# Patient Record
Sex: FEMALE | Race: WHITE
[De-identification: names, ages, dates, MRNs, and addresses within clinical notes are randomized per-mention and may not be internally consistent; named-entity substitution may affect disease eponyms.]

---

## 2019-11-07 ENCOUNTER — HOSPITAL ENCOUNTER (INPATIENT)
Dept: HOSPITAL 12 - ER | Age: 84
LOS: 7 days | Discharge: NURSING, REHAB OR LONG-TERM CARE | DRG: 871 | End: 2019-11-14
Attending: INTERNAL MEDICINE | Admitting: INTERNAL MEDICINE
Payer: MEDICARE

## 2019-11-07 VITALS — WEIGHT: 144 LBS | BODY MASS INDEX: 24.59 KG/M2 | HEIGHT: 64 IN

## 2019-11-07 VITALS — SYSTOLIC BLOOD PRESSURE: 132 MMHG | DIASTOLIC BLOOD PRESSURE: 69 MMHG

## 2019-11-07 DIAGNOSIS — M47.9: ICD-10-CM

## 2019-11-07 DIAGNOSIS — I34.0: ICD-10-CM

## 2019-11-07 DIAGNOSIS — G92: ICD-10-CM

## 2019-11-07 DIAGNOSIS — I48.0: ICD-10-CM

## 2019-11-07 DIAGNOSIS — F03.90: ICD-10-CM

## 2019-11-07 DIAGNOSIS — I25.10: ICD-10-CM

## 2019-11-07 DIAGNOSIS — F32.9: ICD-10-CM

## 2019-11-07 DIAGNOSIS — G40.909: ICD-10-CM

## 2019-11-07 DIAGNOSIS — G62.9: ICD-10-CM

## 2019-11-07 DIAGNOSIS — M81.0: ICD-10-CM

## 2019-11-07 DIAGNOSIS — I25.5: ICD-10-CM

## 2019-11-07 DIAGNOSIS — Z90.710: ICD-10-CM

## 2019-11-07 DIAGNOSIS — D68.59: ICD-10-CM

## 2019-11-07 DIAGNOSIS — R26.81: ICD-10-CM

## 2019-11-07 DIAGNOSIS — E78.5: ICD-10-CM

## 2019-11-07 DIAGNOSIS — I50.43: ICD-10-CM

## 2019-11-07 DIAGNOSIS — N18.4: ICD-10-CM

## 2019-11-07 DIAGNOSIS — J96.01: ICD-10-CM

## 2019-11-07 DIAGNOSIS — E43: ICD-10-CM

## 2019-11-07 DIAGNOSIS — A41.9: Primary | ICD-10-CM

## 2019-11-07 DIAGNOSIS — N13.30: ICD-10-CM

## 2019-11-07 DIAGNOSIS — Z79.899: ICD-10-CM

## 2019-11-07 DIAGNOSIS — Z86.718: ICD-10-CM

## 2019-11-07 DIAGNOSIS — I13.0: ICD-10-CM

## 2019-11-07 DIAGNOSIS — Z74.09: ICD-10-CM

## 2019-11-07 DIAGNOSIS — I25.2: ICD-10-CM

## 2019-11-07 DIAGNOSIS — Z87.01: ICD-10-CM

## 2019-11-07 DIAGNOSIS — Z79.01: ICD-10-CM

## 2019-11-07 DIAGNOSIS — R91.8: ICD-10-CM

## 2019-11-07 DIAGNOSIS — M48.02: ICD-10-CM

## 2019-11-07 DIAGNOSIS — E87.1: ICD-10-CM

## 2019-11-07 DIAGNOSIS — Z91.81: ICD-10-CM

## 2019-11-07 DIAGNOSIS — J18.1: ICD-10-CM

## 2019-11-07 DIAGNOSIS — F41.9: ICD-10-CM

## 2019-11-07 DIAGNOSIS — N81.4: ICD-10-CM

## 2019-11-07 DIAGNOSIS — I21.A1: ICD-10-CM

## 2019-11-07 DIAGNOSIS — I44.1: ICD-10-CM

## 2019-11-07 DIAGNOSIS — N17.0: ICD-10-CM

## 2019-11-07 LAB
BASOPHILS # BLD AUTO: 0.1 K/UL (ref 0–8)
BASOPHILS NFR BLD AUTO: 0.8 % (ref 0–2)
BUN SERPL-MCNC: 32 MG/DL (ref 7–18)
CHLORIDE SERPL-SCNC: 98 MMOL/L (ref 98–107)
CO2 SERPL-SCNC: 29 MMOL/L (ref 21–32)
CREAT SERPL-MCNC: 2.4 MG/DL (ref 0.6–1.3)
EOSINOPHIL # BLD AUTO: 0.1 K/UL (ref 0–0.7)
EOSINOPHIL NFR BLD AUTO: 1.1 % (ref 0–7)
GLUCOSE SERPL-MCNC: 94 MG/DL (ref 74–106)
HCT VFR BLD AUTO: 24.5 % (ref 31.2–41.9)
HGB BLD-MCNC: 7.7 G/DL (ref 10.9–14.3)
LYMPHOCYTES # BLD AUTO: 1 K/UL (ref 20–40)
LYMPHOCYTES NFR BLD AUTO: 8.6 % (ref 20.5–51.5)
MCH RBC QN AUTO: 28.7 UUG (ref 24.7–32.8)
MCHC RBC AUTO-ENTMCNC: 32 G/DL (ref 32.3–35.6)
MCV RBC AUTO: 90.7 FL (ref 75.5–95.3)
MONOCYTES # BLD AUTO: 0.7 K/UL (ref 2–10)
MONOCYTES NFR BLD AUTO: 5.6 % (ref 0–11)
NEUTROPHILS # BLD AUTO: 10 K/UL (ref 1.8–8.9)
NEUTROPHILS NFR BLD AUTO: 83.9 % (ref 38.5–71.5)
PLATELET # BLD AUTO: 594 K/UL (ref 179–408)
POTASSIUM SERPL-SCNC: 4.9 MMOL/L (ref 3.5–5.1)
RBC # BLD AUTO: 2.7 MIL/UL (ref 3.63–4.92)
WBC # BLD AUTO: 11.9 K/UL (ref 3.8–11.8)

## 2019-11-07 PROCEDURE — P9021 RED BLOOD CELLS UNIT: HCPCS

## 2019-11-07 PROCEDURE — G0378 HOSPITAL OBSERVATION PER HR: HCPCS

## 2019-11-07 PROCEDURE — A4663 DIALYSIS BLOOD PRESSURE CUFF: HCPCS

## 2019-11-08 VITALS — SYSTOLIC BLOOD PRESSURE: 115 MMHG | DIASTOLIC BLOOD PRESSURE: 67 MMHG

## 2019-11-08 VITALS — SYSTOLIC BLOOD PRESSURE: 105 MMHG | DIASTOLIC BLOOD PRESSURE: 58 MMHG

## 2019-11-08 VITALS — DIASTOLIC BLOOD PRESSURE: 61 MMHG | SYSTOLIC BLOOD PRESSURE: 117 MMHG

## 2019-11-08 VITALS — SYSTOLIC BLOOD PRESSURE: 136 MMHG | DIASTOLIC BLOOD PRESSURE: 67 MMHG

## 2019-11-08 VITALS — DIASTOLIC BLOOD PRESSURE: 64 MMHG | SYSTOLIC BLOOD PRESSURE: 120 MMHG

## 2019-11-08 VITALS — DIASTOLIC BLOOD PRESSURE: 47 MMHG | SYSTOLIC BLOOD PRESSURE: 87 MMHG

## 2019-11-08 VITALS — SYSTOLIC BLOOD PRESSURE: 139 MMHG | DIASTOLIC BLOOD PRESSURE: 56 MMHG

## 2019-11-08 VITALS — SYSTOLIC BLOOD PRESSURE: 128 MMHG | DIASTOLIC BLOOD PRESSURE: 62 MMHG

## 2019-11-08 VITALS — DIASTOLIC BLOOD PRESSURE: 70 MMHG | SYSTOLIC BLOOD PRESSURE: 140 MMHG

## 2019-11-08 VITALS — SYSTOLIC BLOOD PRESSURE: 100 MMHG | DIASTOLIC BLOOD PRESSURE: 60 MMHG

## 2019-11-08 VITALS — DIASTOLIC BLOOD PRESSURE: 55 MMHG | SYSTOLIC BLOOD PRESSURE: 104 MMHG

## 2019-11-08 VITALS — DIASTOLIC BLOOD PRESSURE: 63 MMHG | SYSTOLIC BLOOD PRESSURE: 125 MMHG

## 2019-11-08 VITALS — DIASTOLIC BLOOD PRESSURE: 59 MMHG | SYSTOLIC BLOOD PRESSURE: 110 MMHG

## 2019-11-08 VITALS — DIASTOLIC BLOOD PRESSURE: 69 MMHG | SYSTOLIC BLOOD PRESSURE: 153 MMHG

## 2019-11-08 VITALS — DIASTOLIC BLOOD PRESSURE: 63 MMHG | SYSTOLIC BLOOD PRESSURE: 146 MMHG

## 2019-11-08 VITALS — SYSTOLIC BLOOD PRESSURE: 135 MMHG | DIASTOLIC BLOOD PRESSURE: 59 MMHG

## 2019-11-08 VITALS — DIASTOLIC BLOOD PRESSURE: 54 MMHG | SYSTOLIC BLOOD PRESSURE: 108 MMHG

## 2019-11-08 VITALS — SYSTOLIC BLOOD PRESSURE: 111 MMHG | DIASTOLIC BLOOD PRESSURE: 30 MMHG

## 2019-11-08 VITALS — DIASTOLIC BLOOD PRESSURE: 61 MMHG | SYSTOLIC BLOOD PRESSURE: 126 MMHG

## 2019-11-08 VITALS — SYSTOLIC BLOOD PRESSURE: 109 MMHG | DIASTOLIC BLOOD PRESSURE: 63 MMHG

## 2019-11-08 VITALS — DIASTOLIC BLOOD PRESSURE: 54 MMHG | SYSTOLIC BLOOD PRESSURE: 109 MMHG

## 2019-11-08 VITALS — DIASTOLIC BLOOD PRESSURE: 73 MMHG | SYSTOLIC BLOOD PRESSURE: 130 MMHG

## 2019-11-08 VITALS — DIASTOLIC BLOOD PRESSURE: 58 MMHG | SYSTOLIC BLOOD PRESSURE: 116 MMHG

## 2019-11-08 VITALS — SYSTOLIC BLOOD PRESSURE: 126 MMHG | DIASTOLIC BLOOD PRESSURE: 61 MMHG

## 2019-11-08 VITALS — DIASTOLIC BLOOD PRESSURE: 60 MMHG | SYSTOLIC BLOOD PRESSURE: 104 MMHG

## 2019-11-08 VITALS — DIASTOLIC BLOOD PRESSURE: 89 MMHG | SYSTOLIC BLOOD PRESSURE: 120 MMHG

## 2019-11-08 VITALS — DIASTOLIC BLOOD PRESSURE: 72 MMHG | SYSTOLIC BLOOD PRESSURE: 140 MMHG

## 2019-11-08 VITALS — SYSTOLIC BLOOD PRESSURE: 133 MMHG | DIASTOLIC BLOOD PRESSURE: 64 MMHG

## 2019-11-08 LAB
ALP SERPL-CCNC: 61 U/L (ref 50–136)
ALT SERPL W/O P-5'-P-CCNC: 7 U/L (ref 14–59)
APPEARANCE UR: CLEAR
APPEARANCE UR: CLEAR
AST SERPL-CCNC: 9 U/L (ref 15–37)
BASOPHILS # BLD AUTO: 0.1 K/UL (ref 0–8)
BASOPHILS NFR BLD AUTO: 0.7 % (ref 0–2)
BILIRUB SERPL-MCNC: 0.3 MG/DL (ref 0.2–1)
BILIRUB UR QL STRIP: NEGATIVE
BILIRUB UR QL STRIP: NEGATIVE
BUN SERPL-MCNC: 31 MG/DL (ref 7–18)
CHLORIDE SERPL-SCNC: 98 MMOL/L (ref 98–107)
CHOLEST SERPL-MCNC: 124 MG/DL (ref ?–200)
CO2 SERPL-SCNC: 29 MMOL/L (ref 21–32)
COLOR UR: YELLOW
COLOR UR: YELLOW
CREAT SERPL-MCNC: 2.5 MG/DL (ref 0.6–1.3)
CREAT UR-MCNC: 27.6 MG/DL (ref 30–125)
DEPRECATED SQUAMOUS URNS QL MICRO: (no result) /HPF
DEPRECATED SQUAMOUS URNS QL MICRO: (no result) /HPF
EOSINOPHIL # BLD AUTO: 0.2 K/UL (ref 0–0.7)
EOSINOPHIL NFR BLD AUTO: 1.8 % (ref 0–7)
GLUCOSE SERPL-MCNC: 108 MG/DL (ref 74–106)
GLUCOSE UR STRIP-MCNC: NEGATIVE MG/DL
GLUCOSE UR STRIP-MCNC: NEGATIVE MG/DL
HCT VFR BLD AUTO: 24.2 % (ref 31.2–41.9)
HDLC SERPL-MCNC: 29 MG/DL (ref 40–60)
HEMOCCULT STL QL: NEGATIVE
HGB BLD-MCNC: 7.8 G/DL (ref 10.9–14.3)
HGB UR QL STRIP: (no result)
HGB UR QL STRIP: NEGATIVE
IRON SERPL-MCNC: 21 UG/DL (ref 50–175)
KETONES UR STRIP-MCNC: NEGATIVE MG/DL
KETONES UR STRIP-MCNC: NEGATIVE MG/DL
LEUKOCYTE ESTERASE UR QL STRIP: NEGATIVE
LEUKOCYTE ESTERASE UR QL STRIP: NEGATIVE
LYMPHOCYTES # BLD AUTO: 1.2 K/UL (ref 20–40)
LYMPHOCYTES NFR BLD AUTO: 9.5 % (ref 20.5–51.5)
MAGNESIUM SERPL-MCNC: 1.6 MG/DL (ref 1.8–2.4)
MCH RBC QN AUTO: 29.9 UUG (ref 24.7–32.8)
MCHC RBC AUTO-ENTMCNC: 32 G/DL (ref 32.3–35.6)
MCV RBC AUTO: 93.5 FL (ref 75.5–95.3)
MONOCYTES # BLD AUTO: 0.7 K/UL (ref 2–10)
MONOCYTES NFR BLD AUTO: 5.7 % (ref 0–11)
MUCOUS THREADS URNS QL MICRO: (no result) /LPF
NEUTROPHILS # BLD AUTO: 10.2 K/UL (ref 1.8–8.9)
NEUTROPHILS NFR BLD AUTO: 82.3 % (ref 38.5–71.5)
NITRITE UR QL STRIP: NEGATIVE
NITRITE UR QL STRIP: NEGATIVE
PH UR STRIP: 6 [PH] (ref 5–8)
PH UR STRIP: 6 [PH] (ref 5–8)
PHOSPHATE SERPL-MCNC: 4.4 MG/DL (ref 2.5–4.9)
PLATELET # BLD AUTO: 569 K/UL (ref 179–408)
POTASSIUM SERPL-SCNC: 4.5 MMOL/L (ref 3.5–5.1)
PROT UR-MCNC: 44.2 MG/DL
RBC # BLD AUTO: 2.59 MIL/UL (ref 3.63–4.92)
RBC #/AREA URNS HPF: (no result) /HPF (ref 0–3)
RBC #/AREA URNS HPF: (no result) /HPF (ref 0–3)
SP GR UR STRIP: 1.01 (ref 1–1.03)
SP GR UR STRIP: 1.01 (ref 1–1.03)
TRIGL SERPL-MCNC: 49 MG/DL (ref 30–150)
TSH SERPL DL<=0.005 MIU/L-ACNC: 4.31 MIU/ML (ref 0.36–3.74)
UROBILINOGEN UR STRIP-MCNC: 0.2 E.U./DL
UROBILINOGEN UR STRIP-MCNC: 0.2 E.U./DL
WBC # BLD AUTO: 12.4 K/UL (ref 3.8–11.8)
WBC #/AREA URNS HPF: (no result) /HPF
WBC #/AREA URNS HPF: (no result) /HPF (ref 0–3)
WBC #/AREA URNS HPF: (no result) /HPF (ref 0–3)
WS STN SPEC: 6.9 G/DL (ref 6.4–8.2)

## 2019-11-08 PROCEDURE — 30233N1 TRANSFUSION OF NONAUTOLOGOUS RED BLOOD CELLS INTO PERIPHERAL VEIN, PERCUTANEOUS APPROACH: ICD-10-PCS | Performed by: INTERNAL MEDICINE

## 2019-11-08 RX ADMIN — LACTOBACILLUS ACIDOPH-L.BULGARICUS 1 MILLION CELL CHEWABLE TABLET SCH TAB.CHEW: at 09:04

## 2019-11-08 RX ADMIN — GABAPENTIN SCH MG: 100 CAPSULE ORAL at 10:48

## 2019-11-08 RX ADMIN — LEVETIRACETAM SCH MG: 500 TABLET, FILM COATED ORAL at 17:16

## 2019-11-08 RX ADMIN — PIPERACILLIN SODIUM AND TAZOBACTAM SODIUM SCH MLS/HR: .25; 2 INJECTION, POWDER, LYOPHILIZED, FOR SOLUTION INTRAVENOUS at 00:35

## 2019-11-08 RX ADMIN — FUROSEMIDE SCH MG: 10 INJECTION, SOLUTION INTRAMUSCULAR; INTRAVENOUS at 09:03

## 2019-11-08 RX ADMIN — GABAPENTIN SCH MG: 100 CAPSULE ORAL at 12:41

## 2019-11-08 RX ADMIN — ESCITALOPRAM OXALATE SCH MG: 10 TABLET, FILM COATED ORAL at 09:04

## 2019-11-08 RX ADMIN — Medication PRN MG: at 12:36

## 2019-11-08 RX ADMIN — TAZOBACTAM SODIUM AND PIPERACILLIN SODIUM SCH MLS/HR: 375; 3 INJECTION, SOLUTION INTRAVENOUS at 23:38

## 2019-11-08 RX ADMIN — Medication SCH ML: at 12:41

## 2019-11-08 RX ADMIN — LEVETIRACETAM SCH MG: 500 TABLET, FILM COATED ORAL at 09:04

## 2019-11-08 RX ADMIN — Medication SCH ML: at 17:14

## 2019-11-08 RX ADMIN — Medication SCH MG: at 17:00

## 2019-11-08 RX ADMIN — Medication SCH MG: at 09:04

## 2019-11-08 RX ADMIN — ATORVASTATIN CALCIUM SCH MG: 20 TABLET, FILM COATED ORAL at 20:36

## 2019-11-08 RX ADMIN — DOCUSATE SODIUM SCH MG: 100 CAPSULE, LIQUID FILLED ORAL at 20:37

## 2019-11-08 RX ADMIN — GABAPENTIN SCH MG: 100 CAPSULE ORAL at 17:16

## 2019-11-08 RX ADMIN — AMIODARONE HYDROCHLORIDE SCH MG: 200 TABLET ORAL at 09:04

## 2019-11-08 RX ADMIN — LACTOBACILLUS ACIDOPH-L.BULGARICUS 1 MILLION CELL CHEWABLE TABLET SCH TAB.CHEW: at 17:16

## 2019-11-08 RX ADMIN — MAGNESIUM SULFATE IN DEXTROSE SCH MLS/HR: 10 INJECTION, SOLUTION INTRAVENOUS at 10:42

## 2019-11-08 RX ADMIN — PIPERACILLIN SODIUM AND TAZOBACTAM SODIUM SCH MLS/HR: .25; 2 INJECTION, POWDER, LYOPHILIZED, FOR SOLUTION INTRAVENOUS at 06:12

## 2019-11-08 RX ADMIN — HEPARIN SODIUM AND DEXTROSE PRN MLS/HR: 5000; 5 INJECTION INTRAVENOUS at 02:45

## 2019-11-08 RX ADMIN — ASPIRIN SCH MG: 81 TABLET, CHEWABLE ORAL at 09:03

## 2019-11-08 RX ADMIN — Medication SCH ML: at 09:03

## 2019-11-08 RX ADMIN — TAZOBACTAM SODIUM AND PIPERACILLIN SODIUM SCH MLS/HR: 375; 3 INJECTION, SOLUTION INTRAVENOUS at 12:41

## 2019-11-08 RX ADMIN — FAMOTIDINE SCH MG: 20 TABLET, FILM COATED ORAL at 09:03

## 2019-11-08 RX ADMIN — MAGNESIUM SULFATE IN DEXTROSE SCH MLS/HR: 10 INJECTION, SOLUTION INTRAVENOUS at 09:02

## 2019-11-09 VITALS — SYSTOLIC BLOOD PRESSURE: 109 MMHG | DIASTOLIC BLOOD PRESSURE: 54 MMHG

## 2019-11-09 VITALS — DIASTOLIC BLOOD PRESSURE: 67 MMHG | SYSTOLIC BLOOD PRESSURE: 129 MMHG

## 2019-11-09 VITALS — SYSTOLIC BLOOD PRESSURE: 137 MMHG | DIASTOLIC BLOOD PRESSURE: 71 MMHG

## 2019-11-09 VITALS — SYSTOLIC BLOOD PRESSURE: 125 MMHG | DIASTOLIC BLOOD PRESSURE: 65 MMHG

## 2019-11-09 VITALS — DIASTOLIC BLOOD PRESSURE: 67 MMHG | SYSTOLIC BLOOD PRESSURE: 157 MMHG

## 2019-11-09 VITALS — DIASTOLIC BLOOD PRESSURE: 71 MMHG | SYSTOLIC BLOOD PRESSURE: 125 MMHG

## 2019-11-09 VITALS — SYSTOLIC BLOOD PRESSURE: 126 MMHG | DIASTOLIC BLOOD PRESSURE: 61 MMHG

## 2019-11-09 VITALS — DIASTOLIC BLOOD PRESSURE: 65 MMHG | SYSTOLIC BLOOD PRESSURE: 112 MMHG

## 2019-11-09 VITALS — DIASTOLIC BLOOD PRESSURE: 70 MMHG | SYSTOLIC BLOOD PRESSURE: 134 MMHG

## 2019-11-09 VITALS — SYSTOLIC BLOOD PRESSURE: 132 MMHG | DIASTOLIC BLOOD PRESSURE: 65 MMHG

## 2019-11-09 VITALS — DIASTOLIC BLOOD PRESSURE: 59 MMHG | SYSTOLIC BLOOD PRESSURE: 118 MMHG

## 2019-11-09 VITALS — SYSTOLIC BLOOD PRESSURE: 113 MMHG | DIASTOLIC BLOOD PRESSURE: 58 MMHG

## 2019-11-09 VITALS — DIASTOLIC BLOOD PRESSURE: 74 MMHG | SYSTOLIC BLOOD PRESSURE: 109 MMHG

## 2019-11-09 VITALS — DIASTOLIC BLOOD PRESSURE: 58 MMHG | SYSTOLIC BLOOD PRESSURE: 118 MMHG

## 2019-11-09 VITALS — SYSTOLIC BLOOD PRESSURE: 102 MMHG | DIASTOLIC BLOOD PRESSURE: 47 MMHG

## 2019-11-09 VITALS — DIASTOLIC BLOOD PRESSURE: 61 MMHG | SYSTOLIC BLOOD PRESSURE: 120 MMHG

## 2019-11-09 VITALS — DIASTOLIC BLOOD PRESSURE: 67 MMHG | SYSTOLIC BLOOD PRESSURE: 127 MMHG

## 2019-11-09 VITALS — SYSTOLIC BLOOD PRESSURE: 129 MMHG | DIASTOLIC BLOOD PRESSURE: 70 MMHG

## 2019-11-09 LAB
ALP SERPL-CCNC: 70 U/L (ref 50–136)
ALT SERPL W/O P-5'-P-CCNC: 6 U/L (ref 14–59)
AST SERPL-CCNC: 13 U/L (ref 15–37)
BASOPHILS # BLD AUTO: 0 K/UL (ref 0–8)
BASOPHILS NFR BLD AUTO: 0.4 % (ref 0–2)
BILIRUB SERPL-MCNC: 0.4 MG/DL (ref 0.2–1)
BUN SERPL-MCNC: 41 MG/DL (ref 7–18)
CHLORIDE SERPL-SCNC: 97 MMOL/L (ref 98–107)
CK SERPL-CCNC: 16 U/L (ref 26–192)
CO2 SERPL-SCNC: 29 MMOL/L (ref 21–32)
CREAT SERPL-MCNC: 2.5 MG/DL (ref 0.6–1.3)
EOSINOPHIL # BLD AUTO: 0.3 K/UL (ref 0–0.7)
EOSINOPHIL NFR BLD AUTO: 2.4 % (ref 0–7)
GLUCOSE SERPL-MCNC: 89 MG/DL (ref 74–106)
HCT VFR BLD AUTO: 27.4 % (ref 31.2–41.9)
HGB BLD-MCNC: 8.8 G/DL (ref 10.9–14.3)
LYMPHOCYTES # BLD AUTO: 1.5 K/UL (ref 20–40)
LYMPHOCYTES NFR BLD AUTO: 12.6 % (ref 20.5–51.5)
MAGNESIUM SERPL-MCNC: 2.1 MG/DL (ref 1.8–2.4)
MCH RBC QN AUTO: 29.4 UUG (ref 24.7–32.8)
MCHC RBC AUTO-ENTMCNC: 32 G/DL (ref 32.3–35.6)
MCV RBC AUTO: 91.1 FL (ref 75.5–95.3)
MONOCYTES # BLD AUTO: 0.6 K/UL (ref 2–10)
MONOCYTES NFR BLD AUTO: 5.2 % (ref 0–11)
NEUTROPHILS # BLD AUTO: 9.4 K/UL (ref 1.8–8.9)
NEUTROPHILS NFR BLD AUTO: 79.4 % (ref 38.5–71.5)
PHOSPHATE SERPL-MCNC: 5.6 MG/DL (ref 2.5–4.9)
PLATELET # BLD AUTO: 516 K/UL (ref 179–408)
POTASSIUM SERPL-SCNC: 4.4 MMOL/L (ref 3.5–5.1)
RBC # BLD AUTO: 3.01 MIL/UL (ref 3.63–4.92)
WBC # BLD AUTO: 11.8 K/UL (ref 3.8–11.8)
WS STN SPEC: 7 G/DL (ref 6.4–8.2)

## 2019-11-09 RX ADMIN — DOCUSATE SODIUM SCH MG: 100 CAPSULE, LIQUID FILLED ORAL at 22:12

## 2019-11-09 RX ADMIN — AMIODARONE HYDROCHLORIDE SCH MG: 200 TABLET ORAL at 09:22

## 2019-11-09 RX ADMIN — TAZOBACTAM SODIUM AND PIPERACILLIN SODIUM SCH MLS/HR: 375; 3 INJECTION, SOLUTION INTRAVENOUS at 13:33

## 2019-11-09 RX ADMIN — HEPARIN SODIUM AND DEXTROSE PRN MLS/HR: 5000; 5 INJECTION INTRAVENOUS at 04:32

## 2019-11-09 RX ADMIN — TAZOBACTAM SODIUM AND PIPERACILLIN SODIUM SCH MLS/HR: 375; 3 INJECTION, SOLUTION INTRAVENOUS at 23:50

## 2019-11-09 RX ADMIN — FUROSEMIDE SCH MG: 10 INJECTION, SOLUTION INTRAMUSCULAR; INTRAVENOUS at 09:22

## 2019-11-09 RX ADMIN — Medication SCH ML: at 18:02

## 2019-11-09 RX ADMIN — ATORVASTATIN CALCIUM SCH MG: 20 TABLET, FILM COATED ORAL at 22:11

## 2019-11-09 RX ADMIN — GABAPENTIN SCH MG: 100 CAPSULE ORAL at 09:21

## 2019-11-09 RX ADMIN — Medication SCH MG: at 18:03

## 2019-11-09 RX ADMIN — FAMOTIDINE SCH MG: 20 TABLET, FILM COATED ORAL at 09:16

## 2019-11-09 RX ADMIN — Medication SCH ML: at 13:34

## 2019-11-09 RX ADMIN — ESCITALOPRAM OXALATE SCH MG: 10 TABLET, FILM COATED ORAL at 09:21

## 2019-11-09 RX ADMIN — LACTOBACILLUS ACIDOPH-L.BULGARICUS 1 MILLION CELL CHEWABLE TABLET SCH TAB.CHEW: at 18:03

## 2019-11-09 RX ADMIN — Medication SCH ML: at 09:16

## 2019-11-09 RX ADMIN — LACTOBACILLUS ACIDOPH-L.BULGARICUS 1 MILLION CELL CHEWABLE TABLET SCH TAB.CHEW: at 09:18

## 2019-11-09 RX ADMIN — Medication SCH MG: at 09:23

## 2019-11-09 RX ADMIN — ANORECTAL OINTMENT SCH GM: 15.7; .44; 24; 20.6 OINTMENT TOPICAL at 09:25

## 2019-11-09 RX ADMIN — GABAPENTIN SCH MG: 100 CAPSULE ORAL at 13:33

## 2019-11-09 RX ADMIN — GABAPENTIN SCH MG: 100 CAPSULE ORAL at 18:04

## 2019-11-09 RX ADMIN — ANORECTAL OINTMENT SCH GM: 15.7; .44; 24; 20.6 OINTMENT TOPICAL at 22:12

## 2019-11-09 RX ADMIN — LEVETIRACETAM SCH MG: 500 TABLET, FILM COATED ORAL at 09:16

## 2019-11-09 RX ADMIN — ASPIRIN SCH MG: 81 TABLET, CHEWABLE ORAL at 09:16

## 2019-11-09 RX ADMIN — LEVETIRACETAM SCH MG: 500 TABLET, FILM COATED ORAL at 18:02

## 2019-11-10 VITALS — SYSTOLIC BLOOD PRESSURE: 109 MMHG | DIASTOLIC BLOOD PRESSURE: 56 MMHG

## 2019-11-10 VITALS — SYSTOLIC BLOOD PRESSURE: 123 MMHG | DIASTOLIC BLOOD PRESSURE: 52 MMHG

## 2019-11-10 VITALS — DIASTOLIC BLOOD PRESSURE: 61 MMHG | SYSTOLIC BLOOD PRESSURE: 126 MMHG

## 2019-11-10 VITALS — SYSTOLIC BLOOD PRESSURE: 111 MMHG | DIASTOLIC BLOOD PRESSURE: 58 MMHG

## 2019-11-10 VITALS — DIASTOLIC BLOOD PRESSURE: 60 MMHG | SYSTOLIC BLOOD PRESSURE: 123 MMHG

## 2019-11-10 VITALS — DIASTOLIC BLOOD PRESSURE: 57 MMHG | SYSTOLIC BLOOD PRESSURE: 120 MMHG

## 2019-11-10 VITALS — SYSTOLIC BLOOD PRESSURE: 117 MMHG | DIASTOLIC BLOOD PRESSURE: 56 MMHG

## 2019-11-10 VITALS — SYSTOLIC BLOOD PRESSURE: 124 MMHG | DIASTOLIC BLOOD PRESSURE: 69 MMHG

## 2019-11-10 VITALS — SYSTOLIC BLOOD PRESSURE: 117 MMHG | DIASTOLIC BLOOD PRESSURE: 58 MMHG

## 2019-11-10 VITALS — DIASTOLIC BLOOD PRESSURE: 57 MMHG | SYSTOLIC BLOOD PRESSURE: 121 MMHG

## 2019-11-10 VITALS — SYSTOLIC BLOOD PRESSURE: 113 MMHG | DIASTOLIC BLOOD PRESSURE: 54 MMHG

## 2019-11-10 LAB
ALP SERPL-CCNC: 78 U/L (ref 50–136)
ALT SERPL W/O P-5'-P-CCNC: 9 U/L (ref 14–59)
AST SERPL-CCNC: 18 U/L (ref 15–37)
BASOPHILS # BLD AUTO: 0.1 K/UL (ref 0–8)
BASOPHILS NFR BLD AUTO: 1 % (ref 0–2)
BILIRUB SERPL-MCNC: 0.3 MG/DL (ref 0.2–1)
BUN SERPL-MCNC: 51 MG/DL (ref 7–18)
CHLORIDE SERPL-SCNC: 95 MMOL/L (ref 98–107)
CO2 SERPL-SCNC: 29 MMOL/L (ref 21–32)
CREAT SERPL-MCNC: 2.4 MG/DL (ref 0.6–1.3)
EOSINOPHIL # BLD AUTO: 0.5 K/UL (ref 0–0.7)
EOSINOPHIL NFR BLD AUTO: 3.4 % (ref 0–7)
GLUCOSE SERPL-MCNC: 91 MG/DL (ref 74–106)
HCT VFR BLD AUTO: 27 % (ref 31.2–41.9)
HGB BLD-MCNC: 8.8 G/DL (ref 10.9–14.3)
LYMPHOCYTES # BLD AUTO: 1 K/UL (ref 20–40)
LYMPHOCYTES NFR BLD AUTO: 6.6 % (ref 20.5–51.5)
MAGNESIUM SERPL-MCNC: 2 MG/DL (ref 1.8–2.4)
MCH RBC QN AUTO: 29.6 UUG (ref 24.7–32.8)
MCHC RBC AUTO-ENTMCNC: 32 G/DL (ref 32.3–35.6)
MCV RBC AUTO: 91.5 FL (ref 75.5–95.3)
MONOCYTES # BLD AUTO: 0.8 K/UL (ref 2–10)
MONOCYTES NFR BLD AUTO: 5.4 % (ref 0–11)
NEUTROPHILS # BLD AUTO: 12.3 K/UL (ref 1.8–8.9)
NEUTROPHILS NFR BLD AUTO: 83.6 % (ref 38.5–71.5)
PHOSPHATE SERPL-MCNC: 5.6 MG/DL (ref 2.5–4.9)
PLATELET # BLD AUTO: 525 K/UL (ref 179–408)
POTASSIUM SERPL-SCNC: 4.5 MMOL/L (ref 3.5–5.1)
RBC # BLD AUTO: 2.95 MIL/UL (ref 3.63–4.92)
WBC # BLD AUTO: 14.7 K/UL (ref 3.8–11.8)
WS STN SPEC: 7 G/DL (ref 6.4–8.2)

## 2019-11-10 RX ADMIN — ANORECTAL OINTMENT SCH GM: 15.7; .44; 24; 20.6 OINTMENT TOPICAL at 09:00

## 2019-11-10 RX ADMIN — Medication SCH MG: at 17:17

## 2019-11-10 RX ADMIN — TAZOBACTAM SODIUM AND PIPERACILLIN SODIUM SCH MLS/HR: 375; 3 INJECTION, SOLUTION INTRAVENOUS at 12:06

## 2019-11-10 RX ADMIN — LACTOBACILLUS ACIDOPH-L.BULGARICUS 1 MILLION CELL CHEWABLE TABLET SCH TAB.CHEW: at 09:52

## 2019-11-10 RX ADMIN — GABAPENTIN SCH MG: 100 CAPSULE ORAL at 12:06

## 2019-11-10 RX ADMIN — FUROSEMIDE SCH MG: 10 INJECTION, SOLUTION INTRAMUSCULAR; INTRAVENOUS at 09:50

## 2019-11-10 RX ADMIN — ATORVASTATIN CALCIUM SCH MG: 20 TABLET, FILM COATED ORAL at 20:38

## 2019-11-10 RX ADMIN — LACTOBACILLUS ACIDOPH-L.BULGARICUS 1 MILLION CELL CHEWABLE TABLET SCH TAB.CHEW: at 17:17

## 2019-11-10 RX ADMIN — AMIODARONE HYDROCHLORIDE SCH MG: 200 TABLET ORAL at 09:51

## 2019-11-10 RX ADMIN — FAMOTIDINE SCH MG: 20 TABLET, FILM COATED ORAL at 09:50

## 2019-11-10 RX ADMIN — ASPIRIN SCH MG: 81 TABLET, CHEWABLE ORAL at 09:55

## 2019-11-10 RX ADMIN — GABAPENTIN SCH MG: 100 CAPSULE ORAL at 17:17

## 2019-11-10 RX ADMIN — ANORECTAL OINTMENT SCH GM: 15.7; .44; 24; 20.6 OINTMENT TOPICAL at 20:38

## 2019-11-10 RX ADMIN — HEPARIN SODIUM AND DEXTROSE PRN MLS/HR: 5000; 5 INJECTION INTRAVENOUS at 06:04

## 2019-11-10 RX ADMIN — Medication SCH ML: at 12:00

## 2019-11-10 RX ADMIN — DOCUSATE SODIUM SCH MG: 100 CAPSULE, LIQUID FILLED ORAL at 20:38

## 2019-11-10 RX ADMIN — Medication SCH ML: at 09:50

## 2019-11-10 RX ADMIN — LEVETIRACETAM SCH MG: 500 TABLET, FILM COATED ORAL at 17:19

## 2019-11-10 RX ADMIN — Medication SCH MG: at 09:00

## 2019-11-10 RX ADMIN — GABAPENTIN SCH MG: 100 CAPSULE ORAL at 09:52

## 2019-11-10 RX ADMIN — LEVETIRACETAM SCH MG: 500 TABLET, FILM COATED ORAL at 09:52

## 2019-11-10 RX ADMIN — ESCITALOPRAM OXALATE SCH MG: 10 TABLET, FILM COATED ORAL at 09:51

## 2019-11-10 RX ADMIN — Medication SCH ML: at 16:37

## 2019-11-11 VITALS — DIASTOLIC BLOOD PRESSURE: 53 MMHG | SYSTOLIC BLOOD PRESSURE: 113 MMHG

## 2019-11-11 VITALS — SYSTOLIC BLOOD PRESSURE: 107 MMHG | DIASTOLIC BLOOD PRESSURE: 54 MMHG

## 2019-11-11 VITALS — SYSTOLIC BLOOD PRESSURE: 111 MMHG | DIASTOLIC BLOOD PRESSURE: 54 MMHG

## 2019-11-11 VITALS — DIASTOLIC BLOOD PRESSURE: 48 MMHG | SYSTOLIC BLOOD PRESSURE: 104 MMHG

## 2019-11-11 VITALS — DIASTOLIC BLOOD PRESSURE: 56 MMHG | SYSTOLIC BLOOD PRESSURE: 117 MMHG

## 2019-11-11 VITALS — DIASTOLIC BLOOD PRESSURE: 56 MMHG | SYSTOLIC BLOOD PRESSURE: 121 MMHG

## 2019-11-11 VITALS — DIASTOLIC BLOOD PRESSURE: 53 MMHG | SYSTOLIC BLOOD PRESSURE: 105 MMHG

## 2019-11-11 VITALS — SYSTOLIC BLOOD PRESSURE: 121 MMHG | DIASTOLIC BLOOD PRESSURE: 56 MMHG

## 2019-11-11 VITALS — SYSTOLIC BLOOD PRESSURE: 110 MMHG | DIASTOLIC BLOOD PRESSURE: 50 MMHG

## 2019-11-11 LAB
ALBUMIN SERPL ELPH-MCNC: 2.1 G/DL (ref 2.9–4.4)
ALBUMIN/GLOB SERPL: 0.5 {RATIO} (ref 0.7–1.7)
ALPHA1 GLOB SERPL ELPH-MCNC: 0.5 G/DL (ref 0–0.4)
ALPHA2 GLOB SERPL ELPH-MCNC: 0.7 G/DL (ref 0.4–1)
B-GLOBULIN SERPL ELPH-MCNC: 0.9 G/DL (ref 0.7–1.3)
BASOPHILS # BLD AUTO: 0.1 K/UL (ref 0–8)
BASOPHILS NFR BLD AUTO: 0.6 % (ref 0–2)
BUN SERPL-MCNC: 48 MG/DL (ref 7–18)
CHLORIDE SERPL-SCNC: 96 MMOL/L (ref 98–107)
CO2 SERPL-SCNC: 32 MMOL/L (ref 21–32)
CREAT SERPL-MCNC: 2.7 MG/DL (ref 0.6–1.3)
EOSINOPHIL # BLD AUTO: 0.3 K/UL (ref 0–0.7)
EOSINOPHIL NFR BLD AUTO: 2.5 % (ref 0–7)
GAMMA GLOB SERPL ELPH-MCNC: 2.2 G/DL (ref 0.4–1.8)
GLOBULIN SER CALC-MCNC: 4.3 G/DL (ref 2.2–3.9)
GLUCOSE SERPL-MCNC: 89 MG/DL (ref 74–106)
HCT VFR BLD AUTO: 27.9 % (ref 31.2–41.9)
HGB BLD-MCNC: 9 G/DL (ref 10.9–14.3)
LYMPHOCYTES # BLD AUTO: 1 K/UL (ref 20–40)
LYMPHOCYTES NFR BLD AUTO: 7.2 % (ref 20.5–51.5)
MAGNESIUM SERPL-MCNC: 1.9 MG/DL (ref 1.8–2.4)
MCH RBC QN AUTO: 29.4 UUG (ref 24.7–32.8)
MCHC RBC AUTO-ENTMCNC: 32 G/DL (ref 32.3–35.6)
MCV RBC AUTO: 91.2 FL (ref 75.5–95.3)
MONOCYTES # BLD AUTO: 0.7 K/UL (ref 2–10)
MONOCYTES NFR BLD AUTO: 5 % (ref 0–11)
NEUTROPHILS # BLD AUTO: 11.1 K/UL (ref 1.8–8.9)
NEUTROPHILS NFR BLD AUTO: 84.7 % (ref 38.5–71.5)
PHOSPHATE SERPL-MCNC: 5.9 MG/DL (ref 2.5–4.9)
PLATELET # BLD AUTO: 506 K/UL (ref 179–408)
POTASSIUM SERPL-SCNC: 4.5 MMOL/L (ref 3.5–5.1)
RBC # BLD AUTO: 3.05 MIL/UL (ref 3.63–4.92)
WBC # BLD AUTO: 13.1 K/UL (ref 3.8–11.8)

## 2019-11-11 RX ADMIN — LEVETIRACETAM SCH MG: 500 TABLET, FILM COATED ORAL at 09:27

## 2019-11-11 RX ADMIN — ANORECTAL OINTMENT SCH APPLIC: 15.7; .44; 24; 20.6 OINTMENT TOPICAL at 20:15

## 2019-11-11 RX ADMIN — LACTOBACILLUS ACIDOPH-L.BULGARICUS 1 MILLION CELL CHEWABLE TABLET SCH TAB.CHEW: at 17:17

## 2019-11-11 RX ADMIN — Medication SCH ML: at 13:14

## 2019-11-11 RX ADMIN — ANORECTAL OINTMENT SCH APPLIC: 15.7; .44; 24; 20.6 OINTMENT TOPICAL at 13:15

## 2019-11-11 RX ADMIN — APIXABAN SCH MG: 5 TABLET, FILM COATED ORAL at 15:12

## 2019-11-11 RX ADMIN — TAZOBACTAM SODIUM AND PIPERACILLIN SODIUM SCH MLS/HR: 375; 3 INJECTION, SOLUTION INTRAVENOUS at 23:52

## 2019-11-11 RX ADMIN — ESCITALOPRAM OXALATE SCH MG: 10 TABLET, FILM COATED ORAL at 09:27

## 2019-11-11 RX ADMIN — Medication SCH ML: at 10:16

## 2019-11-11 RX ADMIN — Medication SCH MG: at 09:26

## 2019-11-11 RX ADMIN — ATORVASTATIN CALCIUM SCH MG: 20 TABLET, FILM COATED ORAL at 20:14

## 2019-11-11 RX ADMIN — DOCUSATE SODIUM SCH MG: 100 CAPSULE, LIQUID FILLED ORAL at 20:14

## 2019-11-11 RX ADMIN — FUROSEMIDE SCH MG: 10 INJECTION, SOLUTION INTRAMUSCULAR; INTRAVENOUS at 09:25

## 2019-11-11 RX ADMIN — AMIODARONE HYDROCHLORIDE SCH MG: 200 TABLET ORAL at 09:27

## 2019-11-11 RX ADMIN — LACTOBACILLUS ACIDOPH-L.BULGARICUS 1 MILLION CELL CHEWABLE TABLET SCH TAB.CHEW: at 09:26

## 2019-11-11 RX ADMIN — GABAPENTIN SCH MG: 100 CAPSULE ORAL at 09:28

## 2019-11-11 RX ADMIN — ASPIRIN SCH MG: 81 TABLET, CHEWABLE ORAL at 09:28

## 2019-11-11 RX ADMIN — Medication SCH MG: at 17:19

## 2019-11-11 RX ADMIN — TAZOBACTAM SODIUM AND PIPERACILLIN SODIUM SCH MLS/HR: 375; 3 INJECTION, SOLUTION INTRAVENOUS at 00:57

## 2019-11-11 RX ADMIN — LEVETIRACETAM SCH MG: 500 TABLET, FILM COATED ORAL at 17:17

## 2019-11-11 RX ADMIN — TAZOBACTAM SODIUM AND PIPERACILLIN SODIUM SCH MLS/HR: 375; 3 INJECTION, SOLUTION INTRAVENOUS at 13:13

## 2019-11-11 RX ADMIN — FAMOTIDINE SCH MG: 20 TABLET, FILM COATED ORAL at 09:27

## 2019-11-11 RX ADMIN — Medication SCH ML: at 17:19

## 2019-11-11 RX ADMIN — HEPARIN SODIUM AND DEXTROSE PRN MLS/HR: 5000; 5 INJECTION INTRAVENOUS at 06:44

## 2019-11-11 RX ADMIN — APIXABAN SCH MG: 5 TABLET, FILM COATED ORAL at 20:14

## 2019-11-12 VITALS — DIASTOLIC BLOOD PRESSURE: 48 MMHG | SYSTOLIC BLOOD PRESSURE: 101 MMHG

## 2019-11-12 VITALS — SYSTOLIC BLOOD PRESSURE: 98 MMHG | DIASTOLIC BLOOD PRESSURE: 47 MMHG

## 2019-11-12 VITALS — SYSTOLIC BLOOD PRESSURE: 107 MMHG | DIASTOLIC BLOOD PRESSURE: 58 MMHG

## 2019-11-12 VITALS — DIASTOLIC BLOOD PRESSURE: 45 MMHG | SYSTOLIC BLOOD PRESSURE: 97 MMHG

## 2019-11-12 VITALS — DIASTOLIC BLOOD PRESSURE: 54 MMHG | SYSTOLIC BLOOD PRESSURE: 102 MMHG

## 2019-11-12 VITALS — SYSTOLIC BLOOD PRESSURE: 115 MMHG | DIASTOLIC BLOOD PRESSURE: 62 MMHG

## 2019-11-12 LAB
ALP SERPL-CCNC: 71 U/L (ref 50–136)
ALT SERPL W/O P-5'-P-CCNC: 6 U/L (ref 14–59)
AST SERPL-CCNC: 9 U/L (ref 15–37)
BASOPHILS # BLD AUTO: 0.1 K/UL (ref 0–8)
BASOPHILS NFR BLD AUTO: 0.7 % (ref 0–2)
BILIRUB SERPL-MCNC: 0.3 MG/DL (ref 0.2–1)
BUN SERPL-MCNC: 50 MG/DL (ref 7–18)
CHLORIDE SERPL-SCNC: 96 MMOL/L (ref 98–107)
CO2 SERPL-SCNC: 34 MMOL/L (ref 21–32)
CREAT SERPL-MCNC: 2.8 MG/DL (ref 0.6–1.3)
EOSINOPHIL # BLD AUTO: 0.2 K/UL (ref 0–0.7)
EOSINOPHIL NFR BLD AUTO: 1.2 % (ref 0–7)
GLUCOSE SERPL-MCNC: 86 MG/DL (ref 74–106)
HCT VFR BLD AUTO: 26.1 % (ref 31.2–41.9)
HGB BLD-MCNC: 8.4 G/DL (ref 10.9–14.3)
LYMPHOCYTES # BLD AUTO: 1 K/UL (ref 20–40)
LYMPHOCYTES NFR BLD AUTO: 6.9 % (ref 20.5–51.5)
MAGNESIUM SERPL-MCNC: 2 MG/DL (ref 1.8–2.4)
MCH RBC QN AUTO: 29.5 UUG (ref 24.7–32.8)
MCHC RBC AUTO-ENTMCNC: 32 G/DL (ref 32.3–35.6)
MCV RBC AUTO: 92 FL (ref 75.5–95.3)
MONOCYTES # BLD AUTO: 0.6 K/UL (ref 2–10)
MONOCYTES NFR BLD AUTO: 4.3 % (ref 0–11)
NEUTROPHILS # BLD AUTO: 12.3 K/UL (ref 1.8–8.9)
NEUTROPHILS NFR BLD AUTO: 86.9 % (ref 38.5–71.5)
PHOSPHATE SERPL-MCNC: 5.3 MG/DL (ref 2.5–4.9)
PLATELET # BLD AUTO: 450 K/UL (ref 179–408)
POTASSIUM SERPL-SCNC: 4.5 MMOL/L (ref 3.5–5.1)
RBC # BLD AUTO: 2.84 MIL/UL (ref 3.63–4.92)
WBC # BLD AUTO: 14.1 K/UL (ref 3.8–11.8)
WS STN SPEC: 6.9 G/DL (ref 6.4–8.2)

## 2019-11-12 RX ADMIN — AMIODARONE HYDROCHLORIDE SCH MG: 200 TABLET ORAL at 08:34

## 2019-11-12 RX ADMIN — Medication SCH MG: at 08:34

## 2019-11-12 RX ADMIN — Medication PRN MG: at 11:51

## 2019-11-12 RX ADMIN — LEVETIRACETAM SCH MG: 500 TABLET, FILM COATED ORAL at 08:33

## 2019-11-12 RX ADMIN — LACTOBACILLUS ACIDOPH-L.BULGARICUS 1 MILLION CELL CHEWABLE TABLET SCH TAB.CHEW: at 16:59

## 2019-11-12 RX ADMIN — Medication SCH ML: at 12:00

## 2019-11-12 RX ADMIN — GABAPENTIN SCH MG: 100 CAPSULE ORAL at 08:33

## 2019-11-12 RX ADMIN — ESCITALOPRAM OXALATE SCH MG: 10 TABLET, FILM COATED ORAL at 08:33

## 2019-11-12 RX ADMIN — Medication SCH MG: at 17:00

## 2019-11-12 RX ADMIN — LACTOBACILLUS ACIDOPH-L.BULGARICUS 1 MILLION CELL CHEWABLE TABLET SCH TAB.CHEW: at 08:34

## 2019-11-12 RX ADMIN — DOCUSATE SODIUM SCH MG: 100 CAPSULE, LIQUID FILLED ORAL at 20:51

## 2019-11-12 RX ADMIN — ACETAMINOPHEN PRN MG: 325 TABLET ORAL at 05:13

## 2019-11-12 RX ADMIN — ANORECTAL OINTMENT SCH APPLIC: 15.7; .44; 24; 20.6 OINTMENT TOPICAL at 08:52

## 2019-11-12 RX ADMIN — ANORECTAL OINTMENT SCH APPLIC: 15.7; .44; 24; 20.6 OINTMENT TOPICAL at 20:52

## 2019-11-12 RX ADMIN — ASPIRIN SCH MG: 81 TABLET, CHEWABLE ORAL at 08:33

## 2019-11-12 RX ADMIN — TAZOBACTAM SODIUM AND PIPERACILLIN SODIUM SCH MLS/HR: 375; 3 INJECTION, SOLUTION INTRAVENOUS at 11:57

## 2019-11-12 RX ADMIN — FUROSEMIDE SCH MG: 10 INJECTION, SOLUTION INTRAMUSCULAR; INTRAVENOUS at 08:34

## 2019-11-12 RX ADMIN — APIXABAN SCH MG: 5 TABLET, FILM COATED ORAL at 08:50

## 2019-11-12 RX ADMIN — Medication SCH ML: at 08:35

## 2019-11-12 RX ADMIN — LEVETIRACETAM SCH MG: 500 TABLET, FILM COATED ORAL at 16:59

## 2019-11-12 RX ADMIN — FAMOTIDINE SCH MG: 20 TABLET, FILM COATED ORAL at 08:33

## 2019-11-12 RX ADMIN — ATORVASTATIN CALCIUM SCH MG: 20 TABLET, FILM COATED ORAL at 20:51

## 2019-11-12 RX ADMIN — Medication SCH ML: at 17:00

## 2019-11-13 VITALS — SYSTOLIC BLOOD PRESSURE: 105 MMHG | DIASTOLIC BLOOD PRESSURE: 62 MMHG

## 2019-11-13 VITALS — SYSTOLIC BLOOD PRESSURE: 107 MMHG | DIASTOLIC BLOOD PRESSURE: 48 MMHG

## 2019-11-13 VITALS — DIASTOLIC BLOOD PRESSURE: 72 MMHG | SYSTOLIC BLOOD PRESSURE: 100 MMHG

## 2019-11-13 VITALS — SYSTOLIC BLOOD PRESSURE: 117 MMHG | DIASTOLIC BLOOD PRESSURE: 52 MMHG

## 2019-11-13 VITALS — SYSTOLIC BLOOD PRESSURE: 119 MMHG | DIASTOLIC BLOOD PRESSURE: 47 MMHG

## 2019-11-13 LAB
BASOPHILS # BLD AUTO: 0.1 K/UL (ref 0–8)
BASOPHILS NFR BLD AUTO: 0.8 % (ref 0–2)
BUN SERPL-MCNC: 50 MG/DL (ref 7–18)
CHLORIDE SERPL-SCNC: 96 MMOL/L (ref 98–107)
CO2 SERPL-SCNC: 32 MMOL/L (ref 21–32)
CREAT SERPL-MCNC: 2.8 MG/DL (ref 0.6–1.3)
EOSINOPHIL # BLD AUTO: 0.3 K/UL (ref 0–0.7)
EOSINOPHIL NFR BLD AUTO: 2.3 % (ref 0–7)
GLUCOSE SERPL-MCNC: 76 MG/DL (ref 74–106)
HCT VFR BLD AUTO: 27.6 % (ref 31.2–41.9)
HGB BLD-MCNC: 8.8 G/DL (ref 10.9–14.3)
LYMPHOCYTES # BLD AUTO: 0.8 K/UL (ref 20–40)
LYMPHOCYTES NFR BLD AUTO: 5.6 % (ref 20.5–51.5)
MAGNESIUM SERPL-MCNC: 2 MG/DL (ref 1.8–2.4)
MCH RBC QN AUTO: 29.8 UUG (ref 24.7–32.8)
MCHC RBC AUTO-ENTMCNC: 32 G/DL (ref 32.3–35.6)
MCV RBC AUTO: 93.7 FL (ref 75.5–95.3)
MONOCYTES # BLD AUTO: 0.6 K/UL (ref 2–10)
MONOCYTES NFR BLD AUTO: 4.2 % (ref 0–11)
NEUTROPHILS # BLD AUTO: 12.2 K/UL (ref 1.8–8.9)
NEUTROPHILS NFR BLD AUTO: 87.1 % (ref 38.5–71.5)
PHOSPHATE SERPL-MCNC: 5.4 MG/DL (ref 2.5–4.9)
PLATELET # BLD AUTO: 449 K/UL (ref 179–408)
POTASSIUM SERPL-SCNC: 4.7 MMOL/L (ref 3.5–5.1)
RBC # BLD AUTO: 2.95 MIL/UL (ref 3.63–4.92)
WBC # BLD AUTO: 14 K/UL (ref 3.8–11.8)

## 2019-11-13 RX ADMIN — Medication SCH ML: at 11:30

## 2019-11-13 RX ADMIN — ANORECTAL OINTMENT SCH APPLIC: 15.7; .44; 24; 20.6 OINTMENT TOPICAL at 08:49

## 2019-11-13 RX ADMIN — APIXABAN SCH MG: 5 TABLET, FILM COATED ORAL at 17:11

## 2019-11-13 RX ADMIN — TAZOBACTAM SODIUM AND PIPERACILLIN SODIUM SCH MLS/HR: 375; 3 INJECTION, SOLUTION INTRAVENOUS at 00:12

## 2019-11-13 RX ADMIN — AMIODARONE HYDROCHLORIDE SCH MG: 200 TABLET ORAL at 09:50

## 2019-11-13 RX ADMIN — Medication SCH MG: at 09:50

## 2019-11-13 RX ADMIN — LACTOBACILLUS ACIDOPH-L.BULGARICUS 1 MILLION CELL CHEWABLE TABLET SCH TAB.CHEW: at 08:45

## 2019-11-13 RX ADMIN — GABAPENTIN SCH MG: 100 CAPSULE ORAL at 08:44

## 2019-11-13 RX ADMIN — ASPIRIN SCH MG: 81 TABLET, CHEWABLE ORAL at 08:48

## 2019-11-13 RX ADMIN — Medication SCH ML: at 17:08

## 2019-11-13 RX ADMIN — ANORECTAL OINTMENT SCH APPLIC: 15.7; .44; 24; 20.6 OINTMENT TOPICAL at 20:36

## 2019-11-13 RX ADMIN — Medication SCH MG: at 17:00

## 2019-11-13 RX ADMIN — FAMOTIDINE SCH MG: 20 TABLET, FILM COATED ORAL at 08:45

## 2019-11-13 RX ADMIN — LEVETIRACETAM SCH MG: 500 TABLET, FILM COATED ORAL at 08:44

## 2019-11-13 RX ADMIN — ESCITALOPRAM OXALATE SCH MG: 10 TABLET, FILM COATED ORAL at 08:45

## 2019-11-13 RX ADMIN — DOCUSATE SODIUM SCH MG: 100 CAPSULE, LIQUID FILLED ORAL at 20:34

## 2019-11-13 RX ADMIN — ACETAMINOPHEN PRN MG: 325 TABLET ORAL at 11:31

## 2019-11-13 RX ADMIN — LACTOBACILLUS ACIDOPH-L.BULGARICUS 1 MILLION CELL CHEWABLE TABLET SCH TAB.CHEW: at 17:07

## 2019-11-13 RX ADMIN — ATORVASTATIN CALCIUM SCH MG: 20 TABLET, FILM COATED ORAL at 20:34

## 2019-11-13 RX ADMIN — Medication SCH ML: at 08:48

## 2019-11-13 RX ADMIN — LEVETIRACETAM SCH MG: 500 TABLET, FILM COATED ORAL at 17:07

## 2019-11-14 VITALS — SYSTOLIC BLOOD PRESSURE: 107 MMHG | DIASTOLIC BLOOD PRESSURE: 50 MMHG

## 2019-11-14 VITALS — DIASTOLIC BLOOD PRESSURE: 52 MMHG | SYSTOLIC BLOOD PRESSURE: 111 MMHG

## 2019-11-14 VITALS — DIASTOLIC BLOOD PRESSURE: 47 MMHG | SYSTOLIC BLOOD PRESSURE: 116 MMHG

## 2019-11-14 LAB
BASOPHILS # BLD AUTO: 0.1 K/UL (ref 0–8)
BASOPHILS NFR BLD AUTO: 1.1 % (ref 0–2)
BUN SERPL-MCNC: 57 MG/DL (ref 7–18)
CHLORIDE SERPL-SCNC: 96 MMOL/L (ref 98–107)
CO2 SERPL-SCNC: 36 MMOL/L (ref 21–32)
CREAT SERPL-MCNC: 2.8 MG/DL (ref 0.6–1.3)
EOSINOPHIL # BLD AUTO: 0.1 K/UL (ref 0–0.7)
EOSINOPHIL NFR BLD AUTO: 1.2 % (ref 0–7)
GLUCOSE SERPL-MCNC: 85 MG/DL (ref 74–106)
HCT VFR BLD AUTO: 25.8 % (ref 31.2–41.9)
HGB BLD-MCNC: 8.2 G/DL (ref 10.9–14.3)
LYMPHOCYTES # BLD AUTO: 1 K/UL (ref 20–40)
LYMPHOCYTES NFR BLD AUTO: 8.3 % (ref 20.5–51.5)
MAGNESIUM SERPL-MCNC: 2 MG/DL (ref 1.8–2.4)
MCH RBC QN AUTO: 28.9 UUG (ref 24.7–32.8)
MCHC RBC AUTO-ENTMCNC: 32 G/DL (ref 32.3–35.6)
MCV RBC AUTO: 91.5 FL (ref 75.5–95.3)
MONOCYTES # BLD AUTO: 0.6 K/UL (ref 2–10)
MONOCYTES NFR BLD AUTO: 5.1 % (ref 0–11)
NEUTROPHILS # BLD AUTO: 10.4 K/UL (ref 1.8–8.9)
NEUTROPHILS NFR BLD AUTO: 84.3 % (ref 38.5–71.5)
PHOSPHATE SERPL-MCNC: 5.4 MG/DL (ref 2.5–4.9)
PLATELET # BLD AUTO: 424 K/UL (ref 179–408)
POTASSIUM SERPL-SCNC: 4.4 MMOL/L (ref 3.5–5.1)
RBC # BLD AUTO: 2.82 MIL/UL (ref 3.63–4.92)
WBC # BLD AUTO: 12.3 K/UL (ref 3.8–11.8)

## 2019-11-14 RX ADMIN — GABAPENTIN SCH MG: 100 CAPSULE ORAL at 09:39

## 2019-11-14 RX ADMIN — APIXABAN SCH MG: 5 TABLET, FILM COATED ORAL at 09:48

## 2019-11-14 RX ADMIN — Medication SCH MG: at 09:00

## 2019-11-14 RX ADMIN — LACTOBACILLUS ACIDOPH-L.BULGARICUS 1 MILLION CELL CHEWABLE TABLET SCH TAB.CHEW: at 09:39

## 2019-11-14 RX ADMIN — AMIODARONE HYDROCHLORIDE SCH MG: 200 TABLET ORAL at 09:48

## 2019-11-14 RX ADMIN — Medication SCH ML: at 13:43

## 2019-11-14 RX ADMIN — LEVETIRACETAM SCH MG: 500 TABLET, FILM COATED ORAL at 18:22

## 2019-11-14 RX ADMIN — Medication SCH ML: at 09:39

## 2019-11-14 RX ADMIN — LACTOBACILLUS ACIDOPH-L.BULGARICUS 1 MILLION CELL CHEWABLE TABLET SCH TAB.CHEW: at 18:22

## 2019-11-14 RX ADMIN — ANORECTAL OINTMENT SCH APPLIC: 15.7; .44; 24; 20.6 OINTMENT TOPICAL at 09:49

## 2019-11-14 RX ADMIN — Medication PRN MG: at 02:14

## 2019-11-14 RX ADMIN — APIXABAN SCH MG: 5 TABLET, FILM COATED ORAL at 18:23

## 2019-11-14 RX ADMIN — LEVETIRACETAM SCH MG: 500 TABLET, FILM COATED ORAL at 09:39

## 2019-11-14 RX ADMIN — Medication SCH MG: at 17:00

## 2019-11-14 RX ADMIN — ASPIRIN SCH MG: 81 TABLET, CHEWABLE ORAL at 09:40

## 2019-11-14 RX ADMIN — ESCITALOPRAM OXALATE SCH MG: 10 TABLET, FILM COATED ORAL at 09:39

## 2019-11-14 RX ADMIN — Medication PRN MG: at 02:11

## 2019-11-14 RX ADMIN — Medication SCH ML: at 17:00

## 2019-11-14 RX ADMIN — FAMOTIDINE SCH MG: 20 TABLET, FILM COATED ORAL at 09:39

## 2020-02-16 ENCOUNTER — HOSPITAL ENCOUNTER (INPATIENT)
Dept: HOSPITAL 12 - ER | Age: 85
LOS: 18 days | Discharge: ANOTHER HOSPITAL | DRG: 870 | End: 2020-03-05
Attending: INTERNAL MEDICINE
Payer: MEDICARE

## 2020-02-16 VITALS — SYSTOLIC BLOOD PRESSURE: 80 MMHG | DIASTOLIC BLOOD PRESSURE: 50 MMHG

## 2020-02-16 VITALS — SYSTOLIC BLOOD PRESSURE: 130 MMHG | DIASTOLIC BLOOD PRESSURE: 80 MMHG

## 2020-02-16 VITALS — DIASTOLIC BLOOD PRESSURE: 77 MMHG | SYSTOLIC BLOOD PRESSURE: 129 MMHG

## 2020-02-16 VITALS — DIASTOLIC BLOOD PRESSURE: 71 MMHG | SYSTOLIC BLOOD PRESSURE: 113 MMHG

## 2020-02-16 VITALS — DIASTOLIC BLOOD PRESSURE: 75 MMHG | SYSTOLIC BLOOD PRESSURE: 125 MMHG

## 2020-02-16 VITALS — DIASTOLIC BLOOD PRESSURE: 73 MMHG | SYSTOLIC BLOOD PRESSURE: 126 MMHG

## 2020-02-16 VITALS — SYSTOLIC BLOOD PRESSURE: 123 MMHG | DIASTOLIC BLOOD PRESSURE: 66 MMHG

## 2020-02-16 VITALS — SYSTOLIC BLOOD PRESSURE: 121 MMHG | DIASTOLIC BLOOD PRESSURE: 66 MMHG

## 2020-02-16 VITALS — DIASTOLIC BLOOD PRESSURE: 70 MMHG | SYSTOLIC BLOOD PRESSURE: 111 MMHG

## 2020-02-16 VITALS — DIASTOLIC BLOOD PRESSURE: 69 MMHG | SYSTOLIC BLOOD PRESSURE: 122 MMHG

## 2020-02-16 VITALS — SYSTOLIC BLOOD PRESSURE: 115 MMHG | DIASTOLIC BLOOD PRESSURE: 70 MMHG

## 2020-02-16 VITALS — SYSTOLIC BLOOD PRESSURE: 125 MMHG | DIASTOLIC BLOOD PRESSURE: 76 MMHG

## 2020-02-16 VITALS — DIASTOLIC BLOOD PRESSURE: 49 MMHG | SYSTOLIC BLOOD PRESSURE: 82 MMHG

## 2020-02-16 VITALS — SYSTOLIC BLOOD PRESSURE: 119 MMHG | DIASTOLIC BLOOD PRESSURE: 61 MMHG

## 2020-02-16 VITALS — DIASTOLIC BLOOD PRESSURE: 73 MMHG | SYSTOLIC BLOOD PRESSURE: 128 MMHG

## 2020-02-16 VITALS — SYSTOLIC BLOOD PRESSURE: 116 MMHG | DIASTOLIC BLOOD PRESSURE: 76 MMHG

## 2020-02-16 VITALS — SYSTOLIC BLOOD PRESSURE: 116 MMHG | DIASTOLIC BLOOD PRESSURE: 69 MMHG

## 2020-02-16 VITALS — DIASTOLIC BLOOD PRESSURE: 67 MMHG | SYSTOLIC BLOOD PRESSURE: 116 MMHG

## 2020-02-16 VITALS — SYSTOLIC BLOOD PRESSURE: 125 MMHG | DIASTOLIC BLOOD PRESSURE: 87 MMHG

## 2020-02-16 VITALS — DIASTOLIC BLOOD PRESSURE: 67 MMHG | SYSTOLIC BLOOD PRESSURE: 114 MMHG

## 2020-02-16 VITALS — SYSTOLIC BLOOD PRESSURE: 117 MMHG | DIASTOLIC BLOOD PRESSURE: 65 MMHG

## 2020-02-16 VITALS — DIASTOLIC BLOOD PRESSURE: 59 MMHG | SYSTOLIC BLOOD PRESSURE: 109 MMHG

## 2020-02-16 VITALS — DIASTOLIC BLOOD PRESSURE: 67 MMHG | SYSTOLIC BLOOD PRESSURE: 115 MMHG

## 2020-02-16 VITALS — SYSTOLIC BLOOD PRESSURE: 121 MMHG | DIASTOLIC BLOOD PRESSURE: 59 MMHG

## 2020-02-16 VITALS — DIASTOLIC BLOOD PRESSURE: 72 MMHG | SYSTOLIC BLOOD PRESSURE: 128 MMHG

## 2020-02-16 VITALS — DIASTOLIC BLOOD PRESSURE: 60 MMHG | SYSTOLIC BLOOD PRESSURE: 101 MMHG

## 2020-02-16 VITALS — SYSTOLIC BLOOD PRESSURE: 170 MMHG | DIASTOLIC BLOOD PRESSURE: 65 MMHG

## 2020-02-16 VITALS — SYSTOLIC BLOOD PRESSURE: 123 MMHG | DIASTOLIC BLOOD PRESSURE: 71 MMHG

## 2020-02-16 VITALS — SYSTOLIC BLOOD PRESSURE: 104 MMHG | DIASTOLIC BLOOD PRESSURE: 63 MMHG

## 2020-02-16 VITALS — DIASTOLIC BLOOD PRESSURE: 70 MMHG | SYSTOLIC BLOOD PRESSURE: 125 MMHG

## 2020-02-16 VITALS — DIASTOLIC BLOOD PRESSURE: 71 MMHG | SYSTOLIC BLOOD PRESSURE: 125 MMHG

## 2020-02-16 VITALS — DIASTOLIC BLOOD PRESSURE: 71 MMHG | SYSTOLIC BLOOD PRESSURE: 124 MMHG

## 2020-02-16 VITALS — DIASTOLIC BLOOD PRESSURE: 63 MMHG | SYSTOLIC BLOOD PRESSURE: 114 MMHG

## 2020-02-16 VITALS — DIASTOLIC BLOOD PRESSURE: 75 MMHG | SYSTOLIC BLOOD PRESSURE: 126 MMHG

## 2020-02-16 VITALS — DIASTOLIC BLOOD PRESSURE: 70 MMHG | SYSTOLIC BLOOD PRESSURE: 124 MMHG

## 2020-02-16 VITALS — SYSTOLIC BLOOD PRESSURE: 122 MMHG | DIASTOLIC BLOOD PRESSURE: 65 MMHG

## 2020-02-16 VITALS — DIASTOLIC BLOOD PRESSURE: 65 MMHG | SYSTOLIC BLOOD PRESSURE: 118 MMHG

## 2020-02-16 VITALS — DIASTOLIC BLOOD PRESSURE: 74 MMHG | SYSTOLIC BLOOD PRESSURE: 127 MMHG

## 2020-02-16 VITALS — DIASTOLIC BLOOD PRESSURE: 73 MMHG | SYSTOLIC BLOOD PRESSURE: 124 MMHG

## 2020-02-16 VITALS — DIASTOLIC BLOOD PRESSURE: 69 MMHG | SYSTOLIC BLOOD PRESSURE: 124 MMHG

## 2020-02-16 VITALS — DIASTOLIC BLOOD PRESSURE: 73 MMHG | SYSTOLIC BLOOD PRESSURE: 121 MMHG

## 2020-02-16 VITALS — WEIGHT: 101 LBS | BODY MASS INDEX: 18.58 KG/M2 | HEIGHT: 62 IN

## 2020-02-16 VITALS — SYSTOLIC BLOOD PRESSURE: 121 MMHG | DIASTOLIC BLOOD PRESSURE: 52 MMHG

## 2020-02-16 VITALS — DIASTOLIC BLOOD PRESSURE: 56 MMHG | SYSTOLIC BLOOD PRESSURE: 95 MMHG

## 2020-02-16 VITALS — SYSTOLIC BLOOD PRESSURE: 122 MMHG | DIASTOLIC BLOOD PRESSURE: 70 MMHG

## 2020-02-16 VITALS — SYSTOLIC BLOOD PRESSURE: 122 MMHG | DIASTOLIC BLOOD PRESSURE: 71 MMHG

## 2020-02-16 VITALS — SYSTOLIC BLOOD PRESSURE: 130 MMHG | DIASTOLIC BLOOD PRESSURE: 74 MMHG

## 2020-02-16 VITALS — DIASTOLIC BLOOD PRESSURE: 82 MMHG | SYSTOLIC BLOOD PRESSURE: 133 MMHG

## 2020-02-16 VITALS — DIASTOLIC BLOOD PRESSURE: 68 MMHG | SYSTOLIC BLOOD PRESSURE: 124 MMHG

## 2020-02-16 VITALS — SYSTOLIC BLOOD PRESSURE: 82 MMHG | DIASTOLIC BLOOD PRESSURE: 52 MMHG

## 2020-02-16 DIAGNOSIS — I08.1: ICD-10-CM

## 2020-02-16 DIAGNOSIS — Z74.09: ICD-10-CM

## 2020-02-16 DIAGNOSIS — J13: ICD-10-CM

## 2020-02-16 DIAGNOSIS — E43: ICD-10-CM

## 2020-02-16 DIAGNOSIS — R62.7: ICD-10-CM

## 2020-02-16 DIAGNOSIS — M81.0: ICD-10-CM

## 2020-02-16 DIAGNOSIS — E83.42: ICD-10-CM

## 2020-02-16 DIAGNOSIS — N17.0: ICD-10-CM

## 2020-02-16 DIAGNOSIS — I21.A1: ICD-10-CM

## 2020-02-16 DIAGNOSIS — Z79.01: ICD-10-CM

## 2020-02-16 DIAGNOSIS — I70.0: ICD-10-CM

## 2020-02-16 DIAGNOSIS — F41.9: ICD-10-CM

## 2020-02-16 DIAGNOSIS — A04.72: ICD-10-CM

## 2020-02-16 DIAGNOSIS — R65.21: ICD-10-CM

## 2020-02-16 DIAGNOSIS — M50.30: ICD-10-CM

## 2020-02-16 DIAGNOSIS — I44.0: ICD-10-CM

## 2020-02-16 DIAGNOSIS — G92: ICD-10-CM

## 2020-02-16 DIAGNOSIS — E87.2: ICD-10-CM

## 2020-02-16 DIAGNOSIS — I25.10: ICD-10-CM

## 2020-02-16 DIAGNOSIS — Z86.718: ICD-10-CM

## 2020-02-16 DIAGNOSIS — E87.6: ICD-10-CM

## 2020-02-16 DIAGNOSIS — I13.0: ICD-10-CM

## 2020-02-16 DIAGNOSIS — Z91.81: ICD-10-CM

## 2020-02-16 DIAGNOSIS — N18.4: ICD-10-CM

## 2020-02-16 DIAGNOSIS — G62.9: ICD-10-CM

## 2020-02-16 DIAGNOSIS — D68.59: ICD-10-CM

## 2020-02-16 DIAGNOSIS — E78.5: ICD-10-CM

## 2020-02-16 DIAGNOSIS — I50.43: ICD-10-CM

## 2020-02-16 DIAGNOSIS — I25.5: ICD-10-CM

## 2020-02-16 DIAGNOSIS — Z90.710: ICD-10-CM

## 2020-02-16 DIAGNOSIS — M48.02: ICD-10-CM

## 2020-02-16 DIAGNOSIS — F03.90: ICD-10-CM

## 2020-02-16 DIAGNOSIS — Z79.899: ICD-10-CM

## 2020-02-16 DIAGNOSIS — I27.20: ICD-10-CM

## 2020-02-16 DIAGNOSIS — E86.0: ICD-10-CM

## 2020-02-16 DIAGNOSIS — G40.909: ICD-10-CM

## 2020-02-16 DIAGNOSIS — M41.9: ICD-10-CM

## 2020-02-16 DIAGNOSIS — Z87.01: ICD-10-CM

## 2020-02-16 DIAGNOSIS — I48.0: ICD-10-CM

## 2020-02-16 DIAGNOSIS — D63.8: ICD-10-CM

## 2020-02-16 DIAGNOSIS — Z87.440: ICD-10-CM

## 2020-02-16 DIAGNOSIS — J96.01: ICD-10-CM

## 2020-02-16 DIAGNOSIS — I25.2: ICD-10-CM

## 2020-02-16 DIAGNOSIS — B00.1: ICD-10-CM

## 2020-02-16 DIAGNOSIS — R64: ICD-10-CM

## 2020-02-16 DIAGNOSIS — A40.3: Primary | ICD-10-CM

## 2020-02-16 DIAGNOSIS — Z79.82: ICD-10-CM

## 2020-02-16 DIAGNOSIS — J96.02: ICD-10-CM

## 2020-02-16 LAB
ALP SERPL-CCNC: 51 U/L (ref 50–136)
ALP SERPL-CCNC: 64 U/L (ref 50–136)
ALT SERPL W/O P-5'-P-CCNC: < 6 U/L (ref 14–59)
ALT SERPL W/O P-5'-P-CCNC: < 6 U/L (ref 14–59)
APPEARANCE UR: (no result)
AST SERPL-CCNC: 14 U/L (ref 15–37)
AST SERPL-CCNC: 17 U/L (ref 15–37)
BASE EXCESS BLDA CALC-SCNC: -2.2 MMOL/L
BASE EXCESS BLDA CALC-SCNC: -6.5 MMOL/L
BASOPHILS # BLD AUTO: 0 K/UL (ref 0–8)
BASOPHILS # BLD AUTO: 0 K/UL (ref 0–8)
BASOPHILS NFR BLD AUTO: 0 % (ref 0–2)
BASOPHILS NFR BLD AUTO: 0.1 % (ref 0–2)
BILIRUB DIRECT SERPL-MCNC: 0.5 MG/DL (ref 0–0.2)
BILIRUB SERPL-MCNC: 0.5 MG/DL (ref 0.2–1)
BILIRUB SERPL-MCNC: 0.9 MG/DL (ref 0.2–1)
BILIRUB UR QL STRIP: NEGATIVE
BUN SERPL-MCNC: 67 MG/DL (ref 7–18)
BUN SERPL-MCNC: 70 MG/DL (ref 7–18)
BUN SERPL-MCNC: 78 MG/DL (ref 7–18)
CHLORIDE SERPL-SCNC: 106 MMOL/L (ref 98–107)
CHLORIDE SERPL-SCNC: 106 MMOL/L (ref 98–107)
CHLORIDE SERPL-SCNC: 107 MMOL/L (ref 98–107)
CHOLEST SERPL-MCNC: 82 MG/DL (ref ?–200)
CO2 SERPL-SCNC: 22 MMOL/L (ref 21–32)
CO2 SERPL-SCNC: 22 MMOL/L (ref 21–32)
CO2 SERPL-SCNC: 23 MMOL/L (ref 21–32)
COLOR UR: YELLOW
CREAT SERPL-MCNC: 1.7 MG/DL (ref 0.6–1.3)
CREAT SERPL-MCNC: 1.9 MG/DL (ref 0.6–1.3)
CREAT SERPL-MCNC: 1.9 MG/DL (ref 0.6–1.3)
DEPRECATED SQUAMOUS URNS QL MICRO: (no result) /HPF
EOSINOPHIL # BLD AUTO: 0 K/UL (ref 0–0.7)
EOSINOPHIL # BLD AUTO: 0 K/UL (ref 0–0.7)
EOSINOPHIL NFR BLD AUTO: 0 % (ref 0–7)
EOSINOPHIL NFR BLD AUTO: 0 % (ref 0–7)
GLUCOSE SERPL-MCNC: 145 MG/DL (ref 74–106)
GLUCOSE SERPL-MCNC: 158 MG/DL (ref 74–106)
GLUCOSE SERPL-MCNC: 160 MG/DL (ref 74–106)
GLUCOSE UR STRIP-MCNC: NEGATIVE MG/DL
HCO3 BLDA-SCNC: 19.9 MMOL/L
HCO3 BLDA-SCNC: 21.3 MMOL/L
HCT VFR BLD AUTO: 23.9 % (ref 31.2–41.9)
HCT VFR BLD AUTO: 27.1 % (ref 31.2–41.9)
HDLC SERPL-MCNC: 10 MG/DL (ref 40–60)
HGB BLD-MCNC: 7.6 G/DL (ref 10.9–14.3)
HGB BLD-MCNC: 8.8 G/DL (ref 10.9–14.3)
HGB BLDA OXIMETRY-MCNC: 8.7 G/DL (ref 12–16)
HGB BLDA OXIMETRY-MCNC: 8.8 G/DL (ref 12–16)
HGB UR QL STRIP: (no result)
INHALED O2 CONCENTRATION: 28 %
INHALED O2 CONCENTRATION: 60 %
INHALED O2 FLOW RATE: 2 L/MIN (ref 0–30)
INTRINSIC PEEP RESPIRATORY: 5 CMH20
KETONES UR STRIP-MCNC: (no result) MG/DL
LEUKOCYTE ESTERASE UR QL STRIP: (no result)
LYMPHOCYTES # BLD AUTO: 0.1 K/UL (ref 20–40)
LYMPHOCYTES # BLD AUTO: 0.5 K/UL (ref 20–40)
LYMPHOCYTES NFR BLD AUTO: 1.3 % (ref 20.5–51.5)
LYMPHOCYTES NFR BLD AUTO: 3.5 % (ref 20.5–51.5)
MAGNESIUM SERPL-MCNC: 1.6 MG/DL (ref 1.8–2.4)
MCH RBC QN AUTO: 29.7 UUG (ref 24.7–32.8)
MCH RBC QN AUTO: 29.8 UUG (ref 24.7–32.8)
MCHC RBC AUTO-ENTMCNC: 32 G/DL (ref 32.3–35.6)
MCHC RBC AUTO-ENTMCNC: 32 G/DL (ref 32.3–35.6)
MCV RBC AUTO: 91.7 FL (ref 75.5–95.3)
MCV RBC AUTO: 93.9 FL (ref 75.5–95.3)
MONOCYTES # BLD AUTO: 0.1 K/UL (ref 2–10)
MONOCYTES # BLD AUTO: 0.1 K/UL (ref 2–10)
MONOCYTES NFR BLD AUTO: 0.4 % (ref 0–11)
MONOCYTES NFR BLD AUTO: 1.3 % (ref 0–11)
NEUTROPHILS # BLD AUTO: 11.2 K/UL (ref 1.8–8.9)
NEUTROPHILS # BLD AUTO: 13.2 K/UL (ref 1.8–8.9)
NEUTROPHILS NFR BLD AUTO: 96 % (ref 38.5–71.5)
NEUTROPHILS NFR BLD AUTO: 97.4 % (ref 38.5–71.5)
NITRITE UR QL STRIP: NEGATIVE
PCO2 TEMP ADJ BLDA: 31.7 MMHG (ref 35–45)
PCO2 TEMP ADJ BLDA: 43.8 MMHG (ref 35–45)
PEEP SETTING VENT: 450 ML
PH TEMP ADJ BLDA: 7.28 [PH] (ref 7.35–7.45)
PH TEMP ADJ BLDA: 7.45 [PH] (ref 7.35–7.45)
PH UR STRIP: 7.5 [PH] (ref 5–8)
PHOSPHATE SERPL-MCNC: 1.4 MG/DL (ref 2.5–4.9)
PLATELET # BLD AUTO: 260 K/UL (ref 179–408)
PLATELET # BLD AUTO: 400 K/UL (ref 179–408)
PO2 TEMP ADJ BLDA: 160.9 MMHG (ref 75–100)
PO2 TEMP ADJ BLDA: 84.8 MMHG (ref 75–100)
POTASSIUM SERPL-SCNC: 2.6 MMOL/L (ref 3.5–5.1)
POTASSIUM SERPL-SCNC: 2.6 MMOL/L (ref 3.5–5.1)
POTASSIUM SERPL-SCNC: 2.8 MMOL/L (ref 3.5–5.1)
RBC # BLD AUTO: 2.55 MIL/UL (ref 3.63–4.92)
RBC # BLD AUTO: 2.96 MIL/UL (ref 3.63–4.92)
RBC #/AREA URNS HPF: (no result) /HPF (ref 0–3)
SET RATE, BG: 16
SP GR UR STRIP: 1.02 (ref 1–1.03)
SPECIMEN DRAWN FROM PATIENT: (no result)
SPECIMEN DRAWN FROM PATIENT: (no result)
TRIGL SERPL-MCNC: 138 MG/DL (ref 30–150)
TSH SERPL DL<=0.005 MIU/L-ACNC: 1.38 MIU/ML (ref 0.36–3.74)
UROBILINOGEN UR STRIP-MCNC: 0.2 E.U./DL
VENTILATION MODE VENT: (no result)
WBC # BLD AUTO: 11.5 K/UL (ref 3.8–11.8)
WBC # BLD AUTO: 13.7 K/UL (ref 3.8–11.8)
WBC #/AREA URNS HPF: (no result) /HPF
WBC #/AREA URNS HPF: (no result) /HPF (ref 0–3)
WS STN SPEC: 6.5 G/DL (ref 6.4–8.2)
WS STN SPEC: 8 G/DL (ref 6.4–8.2)

## 2020-02-16 PROCEDURE — G0378 HOSPITAL OBSERVATION PER HR: HCPCS

## 2020-02-16 PROCEDURE — 02HV33Z INSERTION OF INFUSION DEVICE INTO SUPERIOR VENA CAVA, PERCUTANEOUS APPROACH: ICD-10-PCS

## 2020-02-16 PROCEDURE — A4217 STERILE WATER/SALINE, 500 ML: HCPCS

## 2020-02-16 PROCEDURE — C1758 CATHETER, URETERAL: HCPCS

## 2020-02-16 PROCEDURE — A4663 DIALYSIS BLOOD PRESSURE CUFF: HCPCS

## 2020-02-16 PROCEDURE — P9047 ALBUMIN (HUMAN), 25%, 50ML: HCPCS

## 2020-02-16 PROCEDURE — 5A1955Z RESPIRATORY VENTILATION, GREATER THAN 96 CONSECUTIVE HOURS: ICD-10-PCS | Performed by: INTERNAL MEDICINE

## 2020-02-16 PROCEDURE — 0DH673Z INSERTION OF INFUSION DEVICE INTO STOMACH, VIA NATURAL OR ARTIFICIAL OPENING: ICD-10-PCS

## 2020-02-16 PROCEDURE — 0BH17EZ INSERTION OF ENDOTRACHEAL AIRWAY INTO TRACHEA, VIA NATURAL OR ARTIFICIAL OPENING: ICD-10-PCS

## 2020-02-16 PROCEDURE — P9021 RED BLOOD CELLS UNIT: HCPCS

## 2020-02-16 RX ADMIN — SODIUM CHLORIDE SCH MLS/HR: 9 INJECTION, SOLUTION INTRAVENOUS at 14:44

## 2020-02-16 RX ADMIN — APIXABAN SCH MG: 5 TABLET, FILM COATED ORAL at 17:23

## 2020-02-16 RX ADMIN — SODIUM CHLORIDE PRN MLS/HR: 0.9 INJECTION, SOLUTION INTRAVENOUS at 10:05

## 2020-02-16 RX ADMIN — ASPIRIN SCH MG: 81 TABLET, CHEWABLE ORAL at 10:36

## 2020-02-16 RX ADMIN — ATORVASTATIN CALCIUM SCH MG: 20 TABLET, FILM COATED ORAL at 21:16

## 2020-02-16 RX ADMIN — FOLIC ACID SCH MG: 1 TABLET ORAL at 10:36

## 2020-02-16 RX ADMIN — LEVETIRACETAM SCH MG: 500 TABLET, FILM COATED ORAL at 10:38

## 2020-02-16 RX ADMIN — FAMOTIDINE SCH MG: 20 TABLET, FILM COATED ORAL at 10:38

## 2020-02-16 RX ADMIN — DRONABINOL SCH MG: 2.5 CAPSULE ORAL at 17:22

## 2020-02-16 RX ADMIN — LEVETIRACETAM SCH MG: 500 TABLET, FILM COATED ORAL at 21:16

## 2020-02-16 RX ADMIN — VITAMIN D, TAB 1000IU (100/BT) SCH UNIT: 25 TAB at 10:39

## 2020-02-16 RX ADMIN — PIPERACILLIN SODIUM AND TAZOBACTAM SODIUM SCH MLS/HR: .375; 3 INJECTION, POWDER, LYOPHILIZED, FOR SOLUTION INTRAVENOUS at 21:16

## 2020-02-16 RX ADMIN — LACTOBACILLUS ACIDOPH-L.BULGARICUS 1 MILLION CELL CHEWABLE TABLET SCH TAB.CHEW: at 10:38

## 2020-02-16 RX ADMIN — DEXTROSE PRN MLS/HR: 5 SOLUTION INTRAVENOUS at 19:34

## 2020-02-16 RX ADMIN — FUROSEMIDE SCH MG: 20 TABLET ORAL at 10:36

## 2020-02-16 RX ADMIN — CYANOCOBALAMIN TAB 1000 MCG SCH MCG: 1000 TAB at 10:39

## 2020-02-16 RX ADMIN — ANORECTAL OINTMENT PRN GM: 15.7; .44; 24; 20.6 OINTMENT TOPICAL at 21:58

## 2020-02-16 RX ADMIN — GABAPENTIN SCH MG: 100 CAPSULE ORAL at 22:01

## 2020-02-16 RX ADMIN — PROPOFOL PRN MLS/HR: 10 INJECTION, EMULSION INTRAVENOUS at 13:39

## 2020-02-16 RX ADMIN — GABAPENTIN SCH MG: 100 CAPSULE ORAL at 10:36

## 2020-02-16 RX ADMIN — GABAPENTIN SCH MG: 100 CAPSULE ORAL at 14:23

## 2020-02-16 RX ADMIN — SENNOSIDES SCH TAB: 8.6 TABLET, COATED ORAL at 21:16

## 2020-02-16 RX ADMIN — SODIUM CHLORIDE SCH MLS/HR: 9 INJECTION, SOLUTION INTRAVENOUS at 17:58

## 2020-02-16 RX ADMIN — DRONABINOL SCH MG: 2.5 CAPSULE ORAL at 10:38

## 2020-02-16 RX ADMIN — LACTOBACILLUS ACIDOPH-L.BULGARICUS 1 MILLION CELL CHEWABLE TABLET SCH TAB.CHEW: at 17:22

## 2020-02-16 RX ADMIN — DEXTROSE PRN MLS/HR: 5 SOLUTION INTRAVENOUS at 11:57

## 2020-02-16 RX ADMIN — AMIODARONE HYDROCHLORIDE SCH MG: 200 TABLET ORAL at 10:37

## 2020-02-16 RX ADMIN — APIXABAN SCH MG: 5 TABLET, FILM COATED ORAL at 10:40

## 2020-02-16 NOTE — NUR
ID SERVICES IN THE UNIT BOSTON HAWKINS NP SEE ORDER HISTORY FOR NEW ORDERS. SHRUTHI NP AT 
BEDSIDE ASSESSING PATIENT.

## 2020-02-16 NOTE — NUR
PULMONARY SERVICES DR. RODRÍGUEZ IN THE UNIT, VIEW ORDER HISTORY FOR NEW ORDERS. DR RODRÍGUEZ AT 
BEDSIDE ASSESSING PATIENT.

## 2020-02-16 NOTE — NUR
Dr. Mei on panel call with Ilya Alvares. Patient accepted for admission to 
CCU under LEILA status, diagnosis: pneumonia, hypokalemia.

## 2020-02-16 NOTE — NUR
A call to pulmonary services,  for report of pt's arrival, message left and as 
informed  will be coming soon.

## 2020-02-16 NOTE — NUR
Pt orally intubated successfully by Dr. Vance in Emergency department with 7.0 ETT. Pt 
placed on continuous mechanical ventilation and placed on ordered settings of A/C-16, 
VT-450, PEEP+5, FIO2-60% Good color changed on capnography. Equal and bilateral breath 
sounds. ETT sercured at 23cm lip line with AnchorFast device. Vent alarm parameters checked, 
on and audible. Sxn'd and sputum sent to lab. Vent plugged into red emergency outlet. Will 
continue to monitor.

## 2020-02-16 NOTE — NUR
RECEIVED PATIENT FROM ER. PATIENT TRANSFERRED TO CCU BED 1 WITH RT. PATIENT INTUBATED AND ON 
VENTILATOR VENT SETTINGS AC20, PEEP 5, FIO2  50% AND TIDAL VOLUME 450. NO ACUTE DISTRESS AT 
THIS TIME.

## 2020-02-16 NOTE — NUR
Received pt in bed in semifowlers position positioned on the left side. Patient is intubated 
with a size 7 ET tube marked at 22. vent settings AC 20, FIO2 50%, tidal vol. 450, PEEP 5. 
Sedated with Propofol @25 patient calm. Alternating sinus rhythm/sinus florinda. On 
NeoSynepherine @50mcg. LUCRETIA PICC line 3 lumen, Right AC 20g peripheral IV. Heels offloaded, 
no sequential compression devices present on legs. 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

## 2020-02-16 NOTE — NUR
RECIEVED PT IN BED, , SEEMS PT WAS RESISTING THE TUBES. INCREASED THE 
PROPOFOL RATE UNTIL PT CALM AND SUPPORTED BY BP. RATE AT 10MCG/MIN.

## 2020-02-16 NOTE — NUR
Clinical pharmacy note-Vancomycin dosing per pharmacy



Subjective;

To start Vancomycin dosing on this 84 year old female patient for sepsis(on Zosyn also)



Objective:

BUN 78  Scr 1.9  WBC 11.5  Temp 99.1

Ht 157.48cm

Wt 43.998kg



Assessment/Plan:

Since renal function is unstable, will start Vancomycin dosing by fall-off random level.

Vancomycin 750mg iv x1 will be given today at 1030 and random will be drawn tomorrow am (on 
order) for further dosing. 

Will follow daily.

## 2020-02-17 VITALS — SYSTOLIC BLOOD PRESSURE: 106 MMHG | DIASTOLIC BLOOD PRESSURE: 55 MMHG

## 2020-02-17 VITALS — DIASTOLIC BLOOD PRESSURE: 46 MMHG | SYSTOLIC BLOOD PRESSURE: 98 MMHG

## 2020-02-17 VITALS — SYSTOLIC BLOOD PRESSURE: 99 MMHG | DIASTOLIC BLOOD PRESSURE: 52 MMHG

## 2020-02-17 VITALS — DIASTOLIC BLOOD PRESSURE: 56 MMHG | SYSTOLIC BLOOD PRESSURE: 110 MMHG

## 2020-02-17 VITALS — SYSTOLIC BLOOD PRESSURE: 108 MMHG | DIASTOLIC BLOOD PRESSURE: 58 MMHG

## 2020-02-17 VITALS — SYSTOLIC BLOOD PRESSURE: 103 MMHG | DIASTOLIC BLOOD PRESSURE: 55 MMHG

## 2020-02-17 VITALS — DIASTOLIC BLOOD PRESSURE: 48 MMHG | SYSTOLIC BLOOD PRESSURE: 84 MMHG

## 2020-02-17 VITALS — DIASTOLIC BLOOD PRESSURE: 55 MMHG | SYSTOLIC BLOOD PRESSURE: 103 MMHG

## 2020-02-17 VITALS — SYSTOLIC BLOOD PRESSURE: 104 MMHG | DIASTOLIC BLOOD PRESSURE: 50 MMHG

## 2020-02-17 VITALS — DIASTOLIC BLOOD PRESSURE: 54 MMHG | SYSTOLIC BLOOD PRESSURE: 112 MMHG

## 2020-02-17 VITALS — SYSTOLIC BLOOD PRESSURE: 103 MMHG | DIASTOLIC BLOOD PRESSURE: 53 MMHG

## 2020-02-17 VITALS — SYSTOLIC BLOOD PRESSURE: 101 MMHG | DIASTOLIC BLOOD PRESSURE: 54 MMHG

## 2020-02-17 VITALS — SYSTOLIC BLOOD PRESSURE: 102 MMHG | DIASTOLIC BLOOD PRESSURE: 55 MMHG

## 2020-02-17 VITALS — SYSTOLIC BLOOD PRESSURE: 111 MMHG | DIASTOLIC BLOOD PRESSURE: 56 MMHG

## 2020-02-17 VITALS — SYSTOLIC BLOOD PRESSURE: 108 MMHG | DIASTOLIC BLOOD PRESSURE: 54 MMHG

## 2020-02-17 VITALS — DIASTOLIC BLOOD PRESSURE: 52 MMHG | SYSTOLIC BLOOD PRESSURE: 105 MMHG

## 2020-02-17 VITALS — DIASTOLIC BLOOD PRESSURE: 53 MMHG | SYSTOLIC BLOOD PRESSURE: 106 MMHG

## 2020-02-17 VITALS — SYSTOLIC BLOOD PRESSURE: 121 MMHG | DIASTOLIC BLOOD PRESSURE: 59 MMHG

## 2020-02-17 VITALS — DIASTOLIC BLOOD PRESSURE: 52 MMHG | SYSTOLIC BLOOD PRESSURE: 98 MMHG

## 2020-02-17 VITALS — SYSTOLIC BLOOD PRESSURE: 106 MMHG | DIASTOLIC BLOOD PRESSURE: 56 MMHG

## 2020-02-17 VITALS — DIASTOLIC BLOOD PRESSURE: 57 MMHG | SYSTOLIC BLOOD PRESSURE: 109 MMHG

## 2020-02-17 VITALS — DIASTOLIC BLOOD PRESSURE: 51 MMHG | SYSTOLIC BLOOD PRESSURE: 103 MMHG

## 2020-02-17 VITALS — SYSTOLIC BLOOD PRESSURE: 98 MMHG | DIASTOLIC BLOOD PRESSURE: 54 MMHG

## 2020-02-17 VITALS — SYSTOLIC BLOOD PRESSURE: 103 MMHG | DIASTOLIC BLOOD PRESSURE: 51 MMHG

## 2020-02-17 VITALS — SYSTOLIC BLOOD PRESSURE: 107 MMHG | DIASTOLIC BLOOD PRESSURE: 51 MMHG

## 2020-02-17 VITALS — DIASTOLIC BLOOD PRESSURE: 58 MMHG | SYSTOLIC BLOOD PRESSURE: 107 MMHG

## 2020-02-17 VITALS — DIASTOLIC BLOOD PRESSURE: 64 MMHG | SYSTOLIC BLOOD PRESSURE: 133 MMHG

## 2020-02-17 VITALS — DIASTOLIC BLOOD PRESSURE: 52 MMHG | SYSTOLIC BLOOD PRESSURE: 96 MMHG

## 2020-02-17 VITALS — SYSTOLIC BLOOD PRESSURE: 110 MMHG | DIASTOLIC BLOOD PRESSURE: 56 MMHG

## 2020-02-17 VITALS — SYSTOLIC BLOOD PRESSURE: 93 MMHG | DIASTOLIC BLOOD PRESSURE: 49 MMHG

## 2020-02-17 VITALS — SYSTOLIC BLOOD PRESSURE: 120 MMHG | DIASTOLIC BLOOD PRESSURE: 57 MMHG

## 2020-02-17 VITALS — DIASTOLIC BLOOD PRESSURE: 55 MMHG | SYSTOLIC BLOOD PRESSURE: 102 MMHG

## 2020-02-17 VITALS — SYSTOLIC BLOOD PRESSURE: 99 MMHG | DIASTOLIC BLOOD PRESSURE: 50 MMHG

## 2020-02-17 VITALS — DIASTOLIC BLOOD PRESSURE: 56 MMHG | SYSTOLIC BLOOD PRESSURE: 107 MMHG

## 2020-02-17 VITALS — DIASTOLIC BLOOD PRESSURE: 56 MMHG | SYSTOLIC BLOOD PRESSURE: 102 MMHG

## 2020-02-17 VITALS — SYSTOLIC BLOOD PRESSURE: 98 MMHG | DIASTOLIC BLOOD PRESSURE: 53 MMHG

## 2020-02-17 VITALS — DIASTOLIC BLOOD PRESSURE: 56 MMHG | SYSTOLIC BLOOD PRESSURE: 113 MMHG

## 2020-02-17 VITALS — SYSTOLIC BLOOD PRESSURE: 100 MMHG | DIASTOLIC BLOOD PRESSURE: 54 MMHG

## 2020-02-17 VITALS — DIASTOLIC BLOOD PRESSURE: 59 MMHG | SYSTOLIC BLOOD PRESSURE: 134 MMHG

## 2020-02-17 VITALS — DIASTOLIC BLOOD PRESSURE: 56 MMHG | SYSTOLIC BLOOD PRESSURE: 117 MMHG

## 2020-02-17 VITALS — SYSTOLIC BLOOD PRESSURE: 99 MMHG | DIASTOLIC BLOOD PRESSURE: 56 MMHG

## 2020-02-17 VITALS — SYSTOLIC BLOOD PRESSURE: 121 MMHG | DIASTOLIC BLOOD PRESSURE: 57 MMHG

## 2020-02-17 VITALS — SYSTOLIC BLOOD PRESSURE: 95 MMHG | DIASTOLIC BLOOD PRESSURE: 53 MMHG

## 2020-02-17 VITALS — SYSTOLIC BLOOD PRESSURE: 98 MMHG | DIASTOLIC BLOOD PRESSURE: 51 MMHG

## 2020-02-17 VITALS — DIASTOLIC BLOOD PRESSURE: 55 MMHG | SYSTOLIC BLOOD PRESSURE: 101 MMHG

## 2020-02-17 VITALS — DIASTOLIC BLOOD PRESSURE: 65 MMHG | SYSTOLIC BLOOD PRESSURE: 118 MMHG

## 2020-02-17 VITALS — SYSTOLIC BLOOD PRESSURE: 105 MMHG | DIASTOLIC BLOOD PRESSURE: 54 MMHG

## 2020-02-17 VITALS — SYSTOLIC BLOOD PRESSURE: 84 MMHG | DIASTOLIC BLOOD PRESSURE: 48 MMHG

## 2020-02-17 VITALS — SYSTOLIC BLOOD PRESSURE: 120 MMHG | DIASTOLIC BLOOD PRESSURE: 56 MMHG

## 2020-02-17 VITALS — SYSTOLIC BLOOD PRESSURE: 107 MMHG | DIASTOLIC BLOOD PRESSURE: 58 MMHG

## 2020-02-17 VITALS — SYSTOLIC BLOOD PRESSURE: 114 MMHG | DIASTOLIC BLOOD PRESSURE: 63 MMHG

## 2020-02-17 VITALS — SYSTOLIC BLOOD PRESSURE: 103 MMHG | DIASTOLIC BLOOD PRESSURE: 57 MMHG

## 2020-02-17 VITALS — DIASTOLIC BLOOD PRESSURE: 58 MMHG | SYSTOLIC BLOOD PRESSURE: 110 MMHG

## 2020-02-17 VITALS — DIASTOLIC BLOOD PRESSURE: 61 MMHG | SYSTOLIC BLOOD PRESSURE: 108 MMHG

## 2020-02-17 VITALS — DIASTOLIC BLOOD PRESSURE: 52 MMHG | SYSTOLIC BLOOD PRESSURE: 104 MMHG

## 2020-02-17 VITALS — SYSTOLIC BLOOD PRESSURE: 106 MMHG | DIASTOLIC BLOOD PRESSURE: 54 MMHG

## 2020-02-17 VITALS — SYSTOLIC BLOOD PRESSURE: 122 MMHG | DIASTOLIC BLOOD PRESSURE: 63 MMHG

## 2020-02-17 VITALS — DIASTOLIC BLOOD PRESSURE: 51 MMHG | SYSTOLIC BLOOD PRESSURE: 96 MMHG

## 2020-02-17 VITALS — SYSTOLIC BLOOD PRESSURE: 102 MMHG | DIASTOLIC BLOOD PRESSURE: 54 MMHG

## 2020-02-17 VITALS — DIASTOLIC BLOOD PRESSURE: 54 MMHG | SYSTOLIC BLOOD PRESSURE: 109 MMHG

## 2020-02-17 VITALS — SYSTOLIC BLOOD PRESSURE: 111 MMHG | DIASTOLIC BLOOD PRESSURE: 58 MMHG

## 2020-02-17 VITALS — DIASTOLIC BLOOD PRESSURE: 54 MMHG | SYSTOLIC BLOOD PRESSURE: 105 MMHG

## 2020-02-17 VITALS — DIASTOLIC BLOOD PRESSURE: 66 MMHG | SYSTOLIC BLOOD PRESSURE: 120 MMHG

## 2020-02-17 VITALS — DIASTOLIC BLOOD PRESSURE: 56 MMHG | SYSTOLIC BLOOD PRESSURE: 106 MMHG

## 2020-02-17 VITALS — SYSTOLIC BLOOD PRESSURE: 105 MMHG | DIASTOLIC BLOOD PRESSURE: 53 MMHG

## 2020-02-17 VITALS — DIASTOLIC BLOOD PRESSURE: 48 MMHG | SYSTOLIC BLOOD PRESSURE: 121 MMHG

## 2020-02-17 VITALS — DIASTOLIC BLOOD PRESSURE: 57 MMHG | SYSTOLIC BLOOD PRESSURE: 120 MMHG

## 2020-02-17 VITALS — SYSTOLIC BLOOD PRESSURE: 103 MMHG | DIASTOLIC BLOOD PRESSURE: 54 MMHG

## 2020-02-17 VITALS — DIASTOLIC BLOOD PRESSURE: 57 MMHG | SYSTOLIC BLOOD PRESSURE: 97 MMHG

## 2020-02-17 VITALS — DIASTOLIC BLOOD PRESSURE: 53 MMHG | SYSTOLIC BLOOD PRESSURE: 100 MMHG

## 2020-02-17 VITALS — DIASTOLIC BLOOD PRESSURE: 57 MMHG | SYSTOLIC BLOOD PRESSURE: 107 MMHG

## 2020-02-17 VITALS — DIASTOLIC BLOOD PRESSURE: 59 MMHG | SYSTOLIC BLOOD PRESSURE: 125 MMHG

## 2020-02-17 VITALS — DIASTOLIC BLOOD PRESSURE: 50 MMHG | SYSTOLIC BLOOD PRESSURE: 94 MMHG

## 2020-02-17 VITALS — SYSTOLIC BLOOD PRESSURE: 107 MMHG | DIASTOLIC BLOOD PRESSURE: 48 MMHG

## 2020-02-17 VITALS — DIASTOLIC BLOOD PRESSURE: 62 MMHG | SYSTOLIC BLOOD PRESSURE: 121 MMHG

## 2020-02-17 VITALS — SYSTOLIC BLOOD PRESSURE: 98 MMHG | DIASTOLIC BLOOD PRESSURE: 48 MMHG

## 2020-02-17 VITALS — DIASTOLIC BLOOD PRESSURE: 66 MMHG | SYSTOLIC BLOOD PRESSURE: 106 MMHG

## 2020-02-17 VITALS — SYSTOLIC BLOOD PRESSURE: 102 MMHG | DIASTOLIC BLOOD PRESSURE: 60 MMHG

## 2020-02-17 VITALS — SYSTOLIC BLOOD PRESSURE: 109 MMHG | DIASTOLIC BLOOD PRESSURE: 57 MMHG

## 2020-02-17 VITALS — DIASTOLIC BLOOD PRESSURE: 56 MMHG | SYSTOLIC BLOOD PRESSURE: 101 MMHG

## 2020-02-17 VITALS — SYSTOLIC BLOOD PRESSURE: 111 MMHG | DIASTOLIC BLOOD PRESSURE: 54 MMHG

## 2020-02-17 LAB
ALP SERPL-CCNC: 45 U/L (ref 50–136)
ALT SERPL W/O P-5'-P-CCNC: < 6 U/L (ref 14–59)
AST SERPL-CCNC: 15 U/L (ref 15–37)
BASE EXCESS BLDA CALC-SCNC: -3 MMOL/L
BASOPHILS # BLD AUTO: 0 K/UL (ref 0–8)
BASOPHILS NFR BLD AUTO: 0 % (ref 0–2)
BILIRUB SERPL-MCNC: 0.5 MG/DL (ref 0.2–1)
BUN SERPL-MCNC: 62 MG/DL (ref 7–18)
CHLORIDE SERPL-SCNC: 108 MMOL/L (ref 98–107)
CK SERPL-CCNC: 24 U/L (ref 26–192)
CO2 SERPL-SCNC: 22 MMOL/L (ref 21–32)
CREAT SERPL-MCNC: 1.7 MG/DL (ref 0.6–1.3)
EOSINOPHIL # BLD AUTO: 0 K/UL (ref 0–0.7)
EOSINOPHIL NFR BLD AUTO: 0 % (ref 0–7)
GLUCOSE SERPL-MCNC: 94 MG/DL (ref 74–106)
HCO3 BLDA-SCNC: 20.6 MMOL/L
HCT VFR BLD AUTO: 25 % (ref 31.2–41.9)
HGB BLD-MCNC: 7.9 G/DL (ref 10.9–14.3)
HGB BLDA OXIMETRY-MCNC: 7.8 G/DL (ref 12–16)
INHALED O2 CONCENTRATION: 50 %
INTRINSIC PEEP RESPIRATORY: 5 CMH20
LYMPHOCYTES # BLD AUTO: 0.9 K/UL (ref 20–40)
LYMPHOCYTES NFR BLD AUTO: 5.5 % (ref 20.5–51.5)
MAGNESIUM SERPL-MCNC: 1.5 MG/DL (ref 1.8–2.4)
MCH RBC QN AUTO: 29.6 UUG (ref 24.7–32.8)
MCHC RBC AUTO-ENTMCNC: 32 G/DL (ref 32.3–35.6)
MCV RBC AUTO: 93.2 FL (ref 75.5–95.3)
MONOCYTES # BLD AUTO: 0.1 K/UL (ref 2–10)
MONOCYTES NFR BLD AUTO: 0.9 % (ref 0–11)
NEUTROPHILS # BLD AUTO: 15.2 K/UL (ref 1.8–8.9)
NEUTROPHILS NFR BLD AUTO: 93.6 % (ref 38.5–71.5)
PCO2 TEMP ADJ BLDA: 31 MMHG (ref 35–45)
PEEP SETTING VENT: 450 ML
PH TEMP ADJ BLDA: 7.44 [PH] (ref 7.35–7.45)
PHOSPHATE SERPL-MCNC: 2.8 MG/DL (ref 2.5–4.9)
PLATELET # BLD AUTO: 188 K/UL (ref 179–408)
PO2 TEMP ADJ BLDA: 107.9 MMHG (ref 75–100)
POTASSIUM SERPL-SCNC: 2.5 MMOL/L (ref 3.5–5.1)
RBC # BLD AUTO: 2.68 MIL/UL (ref 3.63–4.92)
SET RATE, BG: 20
SPECIMEN DRAWN FROM PATIENT: (no result)
VANCOMYCIN SERPL-MCNC: 10 UG/ML (ref 18–26)
VENTILATION MODE VENT: (no result)
WBC # BLD AUTO: 16.2 K/UL (ref 3.8–11.8)
WS STN SPEC: 6.1 G/DL (ref 6.4–8.2)

## 2020-02-17 RX ADMIN — POTASSIUM CHLORIDE SCH MLS/HR: 14.9 INJECTION, SOLUTION INTRAVENOUS at 08:26

## 2020-02-17 RX ADMIN — APIXABAN SCH MG: 5 TABLET, FILM COATED ORAL at 17:31

## 2020-02-17 RX ADMIN — PIPERACILLIN SODIUM AND TAZOBACTAM SODIUM SCH MLS/HR: .375; 3 INJECTION, POWDER, LYOPHILIZED, FOR SOLUTION INTRAVENOUS at 08:40

## 2020-02-17 RX ADMIN — PROPOFOL PRN MLS/HR: 10 INJECTION, EMULSION INTRAVENOUS at 18:17

## 2020-02-17 RX ADMIN — DEXTROSE PRN MLS/HR: 5 SOLUTION INTRAVENOUS at 12:24

## 2020-02-17 RX ADMIN — POTASSIUM CHLORIDE SCH MLS/HR: 14.9 INJECTION, SOLUTION INTRAVENOUS at 11:38

## 2020-02-17 RX ADMIN — FOLIC ACID SCH MG: 1 TABLET ORAL at 08:51

## 2020-02-17 RX ADMIN — GABAPENTIN SCH MG: 100 CAPSULE ORAL at 13:55

## 2020-02-17 RX ADMIN — CYANOCOBALAMIN TAB 1000 MCG SCH MCG: 1000 TAB at 08:53

## 2020-02-17 RX ADMIN — LEVETIRACETAM SCH MG: 500 TABLET, FILM COATED ORAL at 21:27

## 2020-02-17 RX ADMIN — DRONABINOL SCH MG: 2.5 CAPSULE ORAL at 08:51

## 2020-02-17 RX ADMIN — MAGNESIUM SULFATE IN DEXTROSE SCH MLS/HR: 10 INJECTION, SOLUTION INTRAVENOUS at 08:41

## 2020-02-17 RX ADMIN — MAGNESIUM SULFATE IN DEXTROSE SCH MLS/HR: 10 INJECTION, SOLUTION INTRAVENOUS at 09:48

## 2020-02-17 RX ADMIN — FUROSEMIDE SCH MG: 20 TABLET ORAL at 08:52

## 2020-02-17 RX ADMIN — POTASSIUM CHLORIDE SCH MLS/HR: 14.9 INJECTION, SOLUTION INTRAVENOUS at 12:24

## 2020-02-17 RX ADMIN — AMIODARONE HYDROCHLORIDE SCH MG: 200 TABLET ORAL at 08:52

## 2020-02-17 RX ADMIN — PIPERACILLIN SODIUM AND TAZOBACTAM SODIUM SCH MLS/HR: .375; 3 INJECTION, POWDER, LYOPHILIZED, FOR SOLUTION INTRAVENOUS at 21:25

## 2020-02-17 RX ADMIN — LEVETIRACETAM SCH MG: 500 TABLET, FILM COATED ORAL at 08:51

## 2020-02-17 RX ADMIN — FAMOTIDINE SCH MG: 20 TABLET, FILM COATED ORAL at 08:53

## 2020-02-17 RX ADMIN — SODIUM CHLORIDE PRN MLS/HR: 0.9 INJECTION, SOLUTION INTRAVENOUS at 06:23

## 2020-02-17 RX ADMIN — GABAPENTIN SCH MG: 100 CAPSULE ORAL at 21:27

## 2020-02-17 RX ADMIN — DEXTROSE PRN MLS/HR: 5 SOLUTION INTRAVENOUS at 21:30

## 2020-02-17 RX ADMIN — POTASSIUM CHLORIDE SCH MLS/HR: 14.9 INJECTION, SOLUTION INTRAVENOUS at 07:31

## 2020-02-17 RX ADMIN — ALBUMIN HUMAN SCH MLS/HR: 250 SOLUTION INTRAVENOUS at 11:59

## 2020-02-17 RX ADMIN — POTASSIUM CHLORIDE SCH MLS/HR: 14.9 INJECTION, SOLUTION INTRAVENOUS at 09:12

## 2020-02-17 RX ADMIN — PROPOFOL PRN MLS/HR: 10 INJECTION, EMULSION INTRAVENOUS at 00:08

## 2020-02-17 RX ADMIN — GABAPENTIN SCH MG: 100 CAPSULE ORAL at 06:24

## 2020-02-17 RX ADMIN — LACTOBACILLUS ACIDOPH-L.BULGARICUS 1 MILLION CELL CHEWABLE TABLET SCH TAB.CHEW: at 08:51

## 2020-02-17 RX ADMIN — SENNOSIDES SCH TAB: 8.6 TABLET, COATED ORAL at 21:27

## 2020-02-17 RX ADMIN — POTASSIUM CHLORIDE SCH MLS/HR: 14.9 INJECTION, SOLUTION INTRAVENOUS at 10:37

## 2020-02-17 RX ADMIN — ALBUMIN HUMAN SCH MLS/HR: 250 SOLUTION INTRAVENOUS at 17:33

## 2020-02-17 RX ADMIN — DRONABINOL SCH MG: 2.5 CAPSULE ORAL at 17:25

## 2020-02-17 RX ADMIN — LACTOBACILLUS ACIDOPH-L.BULGARICUS 1 MILLION CELL CHEWABLE TABLET SCH TAB.CHEW: at 17:25

## 2020-02-17 RX ADMIN — ASPIRIN SCH MG: 81 TABLET, CHEWABLE ORAL at 08:51

## 2020-02-17 RX ADMIN — ATORVASTATIN CALCIUM SCH MG: 20 TABLET, FILM COATED ORAL at 21:27

## 2020-02-17 RX ADMIN — APIXABAN SCH MG: 5 TABLET, FILM COATED ORAL at 08:54

## 2020-02-17 RX ADMIN — VITAMIN D, TAB 1000IU (100/BT) SCH UNIT: 25 TAB at 08:53

## 2020-02-17 NOTE — NUR
Pt received on continuous mechanical ventilation, orally intubated with 7.0 ETT secured at 
23cm. Pt is on OBREGON vent with settings A/C-20, VT-450, PEEP+5, FIO2-50% Pt tolerating vent 
settings well. Sxn'd for small amounts of thin secretions. ETT secured with AnchorFast 
device, ETT moved periodically per hospital policy. HME changed. Oral care done. PPE used. 
Will continue to monitor.

## 2020-02-17 NOTE — NUR
PT ON CONT OBREGON VENT WITH 7.0 ET/TUBE IN PLACE, 23CM LIP LINE, CURRENT VENT SETTINGS, A/C 
20, VT 450ML, 50%, PEEP5, PT IS SEDATED, CONTROLLED BY VENT MOST OF THE TIME, SUCTIONED VERY 
LIGHT PALE YELL TINGE SECRETIONS, NO VENT CHANGES MADE AT THIS TIME, B/S EQUAL BI/LAT, NO 
VENT CHANGES MADE, PT STABLE AT THIS TIME. ISIS LAWRENCEP

-------------------------------------------------------------------------------

Addendum: 02/17/20 at 0007 by BLAKE NASSAR RT

-------------------------------------------------------------------------------

Amended: Links added.

## 2020-02-17 NOTE — NUR
Received pt orally intubated with 7.0 ETT~23cm at lip line, on Cook vent with the following 
settings of AC-15, Vt-450, PEEP+5, FIO2-40%. No s/s respiratory distress noted. Airway care 
done, pt responded to physical stimuli. ETT moved to the right side. Resus. bag at bedside. 
Vent and alarms checked and reset.

## 2020-02-17 NOTE — NUR
RECHECKED NGT RESIDUAL OBTAINED 25CC/HR, RESUMED TUBE FDG @ 30CC/HR. KEPT HOB ELEVATED @ 30 
DEGREES CONT.

## 2020-02-17 NOTE — NUR
Received pt in bed in semifowlers position positioned on the left side. Patient is intubated 
with a size 7 ET tube marked at 22. vent settings AC 15, FIO2 40%, tidal vol. 450, PEEP 5. 
Sedated with Propofol @10 patient calm. Alternating sinus rhythm/sinus florinda. On 
NeoSynepherine @50mcg. LUCRETIA PICC line 3 lumen, Right AC 20g peripheral IV, left AC 20g 
peripheral IV. Heels offloaded, sequential compression devices present on legs. Hx of DVT on 
eliquis for DVT prophylaxis. NGT feeding Glucerna 1.2 running @40ml/hr.

## 2020-02-17 NOTE — NUR
Clinical pharmacy note-Vancomycin dosing per pharmacy



Subjective;

To continue Vancomycin dosing on this 84 year old female patient for sepsis



Objective:

BUN 62  Scr 1.7  WBC 16.2  Temp 98.3

Vanco random level on 2/16 at 0600: 10

Ht 157.48cm

Wt 43.998kg



Assessment/Plan:

Since renal function is unstable, will start Vancomycin dosing by fall-off random level.

Since vanco random level this am is 10 mcg/ml, will give Vancomycin 5000mg iv x1 will be 
given today at 0800 and random will be drawn tomorrow am (on order) for further dosing. Will 
follow daily.

## 2020-02-17 NOTE — NUR
Residual check for NGT feeding. 150ml aspirated. Feeding held pending reassessment of 
residual in 2 hours.

## 2020-02-18 VITALS — DIASTOLIC BLOOD PRESSURE: 57 MMHG | SYSTOLIC BLOOD PRESSURE: 114 MMHG

## 2020-02-18 VITALS — SYSTOLIC BLOOD PRESSURE: 110 MMHG | DIASTOLIC BLOOD PRESSURE: 56 MMHG

## 2020-02-18 VITALS — SYSTOLIC BLOOD PRESSURE: 87 MMHG | DIASTOLIC BLOOD PRESSURE: 50 MMHG

## 2020-02-18 VITALS — DIASTOLIC BLOOD PRESSURE: 59 MMHG | SYSTOLIC BLOOD PRESSURE: 126 MMHG

## 2020-02-18 VITALS — DIASTOLIC BLOOD PRESSURE: 72 MMHG | SYSTOLIC BLOOD PRESSURE: 116 MMHG

## 2020-02-18 VITALS — DIASTOLIC BLOOD PRESSURE: 57 MMHG | SYSTOLIC BLOOD PRESSURE: 122 MMHG

## 2020-02-18 VITALS — DIASTOLIC BLOOD PRESSURE: 56 MMHG | SYSTOLIC BLOOD PRESSURE: 105 MMHG

## 2020-02-18 VITALS — SYSTOLIC BLOOD PRESSURE: 107 MMHG | DIASTOLIC BLOOD PRESSURE: 53 MMHG

## 2020-02-18 VITALS — SYSTOLIC BLOOD PRESSURE: 122 MMHG | DIASTOLIC BLOOD PRESSURE: 64 MMHG

## 2020-02-18 VITALS — SYSTOLIC BLOOD PRESSURE: 115 MMHG | DIASTOLIC BLOOD PRESSURE: 59 MMHG

## 2020-02-18 VITALS — DIASTOLIC BLOOD PRESSURE: 47 MMHG | SYSTOLIC BLOOD PRESSURE: 107 MMHG

## 2020-02-18 VITALS — SYSTOLIC BLOOD PRESSURE: 87 MMHG | DIASTOLIC BLOOD PRESSURE: 49 MMHG

## 2020-02-18 VITALS — DIASTOLIC BLOOD PRESSURE: 61 MMHG | SYSTOLIC BLOOD PRESSURE: 121 MMHG

## 2020-02-18 VITALS — SYSTOLIC BLOOD PRESSURE: 90 MMHG | DIASTOLIC BLOOD PRESSURE: 50 MMHG

## 2020-02-18 VITALS — SYSTOLIC BLOOD PRESSURE: 128 MMHG | DIASTOLIC BLOOD PRESSURE: 57 MMHG

## 2020-02-18 VITALS — SYSTOLIC BLOOD PRESSURE: 126 MMHG | DIASTOLIC BLOOD PRESSURE: 64 MMHG

## 2020-02-18 VITALS — DIASTOLIC BLOOD PRESSURE: 59 MMHG | SYSTOLIC BLOOD PRESSURE: 127 MMHG

## 2020-02-18 VITALS — DIASTOLIC BLOOD PRESSURE: 47 MMHG | SYSTOLIC BLOOD PRESSURE: 85 MMHG

## 2020-02-18 VITALS — DIASTOLIC BLOOD PRESSURE: 61 MMHG | SYSTOLIC BLOOD PRESSURE: 123 MMHG

## 2020-02-18 VITALS — SYSTOLIC BLOOD PRESSURE: 90 MMHG | DIASTOLIC BLOOD PRESSURE: 51 MMHG

## 2020-02-18 VITALS — SYSTOLIC BLOOD PRESSURE: 98 MMHG | DIASTOLIC BLOOD PRESSURE: 55 MMHG

## 2020-02-18 VITALS — SYSTOLIC BLOOD PRESSURE: 103 MMHG | DIASTOLIC BLOOD PRESSURE: 37 MMHG

## 2020-02-18 VITALS — SYSTOLIC BLOOD PRESSURE: 127 MMHG | DIASTOLIC BLOOD PRESSURE: 56 MMHG

## 2020-02-18 VITALS — SYSTOLIC BLOOD PRESSURE: 78 MMHG | DIASTOLIC BLOOD PRESSURE: 43 MMHG

## 2020-02-18 VITALS — DIASTOLIC BLOOD PRESSURE: 51 MMHG | SYSTOLIC BLOOD PRESSURE: 91 MMHG

## 2020-02-18 VITALS — DIASTOLIC BLOOD PRESSURE: 56 MMHG | SYSTOLIC BLOOD PRESSURE: 117 MMHG

## 2020-02-18 VITALS — SYSTOLIC BLOOD PRESSURE: 122 MMHG | DIASTOLIC BLOOD PRESSURE: 57 MMHG

## 2020-02-18 VITALS — DIASTOLIC BLOOD PRESSURE: 49 MMHG | SYSTOLIC BLOOD PRESSURE: 87 MMHG

## 2020-02-18 VITALS — SYSTOLIC BLOOD PRESSURE: 116 MMHG | DIASTOLIC BLOOD PRESSURE: 55 MMHG

## 2020-02-18 VITALS — SYSTOLIC BLOOD PRESSURE: 132 MMHG | DIASTOLIC BLOOD PRESSURE: 63 MMHG

## 2020-02-18 VITALS — DIASTOLIC BLOOD PRESSURE: 56 MMHG | SYSTOLIC BLOOD PRESSURE: 133 MMHG

## 2020-02-18 VITALS — DIASTOLIC BLOOD PRESSURE: 64 MMHG | SYSTOLIC BLOOD PRESSURE: 135 MMHG

## 2020-02-18 VITALS — DIASTOLIC BLOOD PRESSURE: 60 MMHG | SYSTOLIC BLOOD PRESSURE: 132 MMHG

## 2020-02-18 VITALS — DIASTOLIC BLOOD PRESSURE: 58 MMHG | SYSTOLIC BLOOD PRESSURE: 131 MMHG

## 2020-02-18 VITALS — DIASTOLIC BLOOD PRESSURE: 57 MMHG | SYSTOLIC BLOOD PRESSURE: 99 MMHG

## 2020-02-18 VITALS — DIASTOLIC BLOOD PRESSURE: 56 MMHG | SYSTOLIC BLOOD PRESSURE: 128 MMHG

## 2020-02-18 VITALS — DIASTOLIC BLOOD PRESSURE: 61 MMHG | SYSTOLIC BLOOD PRESSURE: 127 MMHG

## 2020-02-18 VITALS — SYSTOLIC BLOOD PRESSURE: 134 MMHG | DIASTOLIC BLOOD PRESSURE: 58 MMHG

## 2020-02-18 VITALS — DIASTOLIC BLOOD PRESSURE: 57 MMHG | SYSTOLIC BLOOD PRESSURE: 104 MMHG

## 2020-02-18 VITALS — DIASTOLIC BLOOD PRESSURE: 50 MMHG | SYSTOLIC BLOOD PRESSURE: 86 MMHG

## 2020-02-18 VITALS — DIASTOLIC BLOOD PRESSURE: 53 MMHG | SYSTOLIC BLOOD PRESSURE: 93 MMHG

## 2020-02-18 VITALS — SYSTOLIC BLOOD PRESSURE: 88 MMHG | DIASTOLIC BLOOD PRESSURE: 49 MMHG

## 2020-02-18 VITALS — SYSTOLIC BLOOD PRESSURE: 107 MMHG | DIASTOLIC BLOOD PRESSURE: 58 MMHG

## 2020-02-18 VITALS — SYSTOLIC BLOOD PRESSURE: 125 MMHG | DIASTOLIC BLOOD PRESSURE: 62 MMHG

## 2020-02-18 VITALS — SYSTOLIC BLOOD PRESSURE: 126 MMHG | DIASTOLIC BLOOD PRESSURE: 65 MMHG

## 2020-02-18 VITALS — DIASTOLIC BLOOD PRESSURE: 84 MMHG | SYSTOLIC BLOOD PRESSURE: 127 MMHG

## 2020-02-18 VITALS — SYSTOLIC BLOOD PRESSURE: 125 MMHG | DIASTOLIC BLOOD PRESSURE: 63 MMHG

## 2020-02-18 VITALS — DIASTOLIC BLOOD PRESSURE: 51 MMHG | SYSTOLIC BLOOD PRESSURE: 89 MMHG

## 2020-02-18 VITALS — DIASTOLIC BLOOD PRESSURE: 64 MMHG | SYSTOLIC BLOOD PRESSURE: 138 MMHG

## 2020-02-18 VITALS — SYSTOLIC BLOOD PRESSURE: 112 MMHG | DIASTOLIC BLOOD PRESSURE: 58 MMHG

## 2020-02-18 VITALS — SYSTOLIC BLOOD PRESSURE: 132 MMHG | DIASTOLIC BLOOD PRESSURE: 58 MMHG

## 2020-02-18 VITALS — DIASTOLIC BLOOD PRESSURE: 59 MMHG | SYSTOLIC BLOOD PRESSURE: 125 MMHG

## 2020-02-18 VITALS — DIASTOLIC BLOOD PRESSURE: 60 MMHG | SYSTOLIC BLOOD PRESSURE: 119 MMHG

## 2020-02-18 VITALS — DIASTOLIC BLOOD PRESSURE: 58 MMHG | SYSTOLIC BLOOD PRESSURE: 122 MMHG

## 2020-02-18 VITALS — SYSTOLIC BLOOD PRESSURE: 128 MMHG | DIASTOLIC BLOOD PRESSURE: 58 MMHG

## 2020-02-18 VITALS — SYSTOLIC BLOOD PRESSURE: 136 MMHG | DIASTOLIC BLOOD PRESSURE: 59 MMHG

## 2020-02-18 VITALS — SYSTOLIC BLOOD PRESSURE: 118 MMHG | DIASTOLIC BLOOD PRESSURE: 57 MMHG

## 2020-02-18 VITALS — DIASTOLIC BLOOD PRESSURE: 57 MMHG | SYSTOLIC BLOOD PRESSURE: 113 MMHG

## 2020-02-18 VITALS — SYSTOLIC BLOOD PRESSURE: 106 MMHG | DIASTOLIC BLOOD PRESSURE: 57 MMHG

## 2020-02-18 VITALS — SYSTOLIC BLOOD PRESSURE: 131 MMHG | DIASTOLIC BLOOD PRESSURE: 63 MMHG

## 2020-02-18 VITALS — SYSTOLIC BLOOD PRESSURE: 94 MMHG | DIASTOLIC BLOOD PRESSURE: 52 MMHG

## 2020-02-18 VITALS — DIASTOLIC BLOOD PRESSURE: 49 MMHG | SYSTOLIC BLOOD PRESSURE: 88 MMHG

## 2020-02-18 VITALS — SYSTOLIC BLOOD PRESSURE: 93 MMHG | DIASTOLIC BLOOD PRESSURE: 43 MMHG

## 2020-02-18 VITALS — DIASTOLIC BLOOD PRESSURE: 53 MMHG | SYSTOLIC BLOOD PRESSURE: 92 MMHG

## 2020-02-18 VITALS — SYSTOLIC BLOOD PRESSURE: 77 MMHG | DIASTOLIC BLOOD PRESSURE: 33 MMHG

## 2020-02-18 VITALS — SYSTOLIC BLOOD PRESSURE: 115 MMHG | DIASTOLIC BLOOD PRESSURE: 62 MMHG

## 2020-02-18 VITALS — DIASTOLIC BLOOD PRESSURE: 59 MMHG | SYSTOLIC BLOOD PRESSURE: 114 MMHG

## 2020-02-18 VITALS — DIASTOLIC BLOOD PRESSURE: 62 MMHG | SYSTOLIC BLOOD PRESSURE: 122 MMHG

## 2020-02-18 VITALS — SYSTOLIC BLOOD PRESSURE: 96 MMHG | DIASTOLIC BLOOD PRESSURE: 52 MMHG

## 2020-02-18 VITALS — SYSTOLIC BLOOD PRESSURE: 114 MMHG | DIASTOLIC BLOOD PRESSURE: 56 MMHG

## 2020-02-18 VITALS — SYSTOLIC BLOOD PRESSURE: 94 MMHG | DIASTOLIC BLOOD PRESSURE: 41 MMHG

## 2020-02-18 VITALS — DIASTOLIC BLOOD PRESSURE: 57 MMHG | SYSTOLIC BLOOD PRESSURE: 116 MMHG

## 2020-02-18 VITALS — SYSTOLIC BLOOD PRESSURE: 104 MMHG | DIASTOLIC BLOOD PRESSURE: 57 MMHG

## 2020-02-18 VITALS — DIASTOLIC BLOOD PRESSURE: 56 MMHG | SYSTOLIC BLOOD PRESSURE: 96 MMHG

## 2020-02-18 VITALS — SYSTOLIC BLOOD PRESSURE: 119 MMHG | DIASTOLIC BLOOD PRESSURE: 53 MMHG

## 2020-02-18 VITALS — SYSTOLIC BLOOD PRESSURE: 125 MMHG | DIASTOLIC BLOOD PRESSURE: 64 MMHG

## 2020-02-18 VITALS — DIASTOLIC BLOOD PRESSURE: 62 MMHG | SYSTOLIC BLOOD PRESSURE: 124 MMHG

## 2020-02-18 VITALS — SYSTOLIC BLOOD PRESSURE: 115 MMHG | DIASTOLIC BLOOD PRESSURE: 60 MMHG

## 2020-02-18 VITALS — SYSTOLIC BLOOD PRESSURE: 132 MMHG | DIASTOLIC BLOOD PRESSURE: 59 MMHG

## 2020-02-18 VITALS — DIASTOLIC BLOOD PRESSURE: 48 MMHG | SYSTOLIC BLOOD PRESSURE: 84 MMHG

## 2020-02-18 VITALS — SYSTOLIC BLOOD PRESSURE: 89 MMHG | DIASTOLIC BLOOD PRESSURE: 51 MMHG

## 2020-02-18 VITALS — DIASTOLIC BLOOD PRESSURE: 62 MMHG | SYSTOLIC BLOOD PRESSURE: 131 MMHG

## 2020-02-18 VITALS — DIASTOLIC BLOOD PRESSURE: 59 MMHG | SYSTOLIC BLOOD PRESSURE: 98 MMHG

## 2020-02-18 LAB
ALBUMIN SERPL ELPH-MCNC: 1.4 G/DL (ref 2.9–4.4)
ALBUMIN/GLOB SERPL: 0.4 {RATIO} (ref 0.7–1.7)
ALPHA1 GLOB SERPL ELPH-MCNC: 0.4 G/DL (ref 0–0.4)
ALPHA2 GLOB SERPL ELPH-MCNC: 1 G/DL (ref 0.4–1)
B-GLOBULIN SERPL ELPH-MCNC: 0.6 G/DL (ref 0.7–1.3)
BASE EXCESS BLDA CALC-SCNC: -8.2 MMOL/L
BASOPHILS # BLD AUTO: 0 K/UL (ref 0–8)
BASOPHILS NFR BLD AUTO: 0.1 % (ref 0–2)
BUN SERPL-MCNC: 59 MG/DL (ref 7–18)
CHLORIDE SERPL-SCNC: 105 MMOL/L (ref 98–107)
CO2 SERPL-SCNC: 20 MMOL/L (ref 21–32)
CREAT SERPL-MCNC: 1.8 MG/DL (ref 0.6–1.3)
EOSINOPHIL # BLD AUTO: 0 K/UL (ref 0–0.7)
EOSINOPHIL NFR BLD AUTO: 0.1 % (ref 0–7)
GAMMA GLOB SERPL ELPH-MCNC: 1.8 G/DL (ref 0.4–1.8)
GLOBULIN SER CALC-MCNC: 3.8 G/DL (ref 2.2–3.9)
GLUCOSE SERPL-MCNC: 130 MG/DL (ref 74–106)
HCO3 BLDA-SCNC: 17.1 MMOL/L
HCT VFR BLD AUTO: 19.5 % (ref 31.2–41.9)
HEMOCCULT STL QL: NEGATIVE
HGB BLD-MCNC: 6.2 G/DL (ref 10.9–14.3)
HGB BLDA OXIMETRY-MCNC: 7.5 G/DL (ref 12–16)
INHALED O2 CONCENTRATION: 40 %
INTRINSIC PEEP RESPIRATORY: 5 CMH20
IRON SERPL-MCNC: 30 UG/DL (ref 50–175)
LYMPHOCYTES # BLD AUTO: 0.9 K/UL (ref 20–40)
LYMPHOCYTES NFR BLD AUTO: 5.8 % (ref 20.5–51.5)
LYMPHOCYTES NFR BLD MANUAL: 6 % (ref 20–40)
MAGNESIUM SERPL-MCNC: 2.3 MG/DL (ref 1.8–2.4)
MCH RBC QN AUTO: 29.5 UUG (ref 24.7–32.8)
MCHC RBC AUTO-ENTMCNC: 32 G/DL (ref 32.3–35.6)
MCV RBC AUTO: 93.4 FL (ref 75.5–95.3)
MONOCYTES # BLD AUTO: 0.1 K/UL (ref 2–10)
MONOCYTES NFR BLD AUTO: 0.9 % (ref 0–11)
MONOCYTES NFR BLD MANUAL: 1 % (ref 2–10)
NEUTROPHILS # BLD AUTO: 14.8 K/UL (ref 1.8–8.9)
NEUTROPHILS NFR BLD AUTO: 93.1 % (ref 38.5–71.5)
NEUTS SEG NFR BLD MANUAL: 93 % (ref 42–75)
PCO2 TEMP ADJ BLDA: 33.6 MMHG (ref 35–45)
PEEP SETTING VENT: 450 ML
PH TEMP ADJ BLDA: 7.32 [PH] (ref 7.35–7.45)
PHOSPHATE SERPL-MCNC: 2.7 MG/DL (ref 2.5–4.9)
PLATELET # BLD AUTO: 123 K/UL (ref 179–408)
PO2 TEMP ADJ BLDA: 160.6 MMHG (ref 75–100)
POTASSIUM SERPL-SCNC: 3.4 MMOL/L (ref 3.5–5.1)
RBC # BLD AUTO: 2.09 MIL/UL (ref 3.63–4.92)
SET RATE, BG: 15
SPECIMEN DRAWN FROM PATIENT: (no result)
VANCOMYCIN SERPL-MCNC: 14.6 UG/ML (ref 18–26)
VENTILATION MODE VENT: (no result)
WBC # BLD AUTO: 15.9 K/UL (ref 3.8–11.8)

## 2020-02-18 PROCEDURE — 30233N1 TRANSFUSION OF NONAUTOLOGOUS RED BLOOD CELLS INTO PERIPHERAL VEIN, PERCUTANEOUS APPROACH: ICD-10-PCS

## 2020-02-18 RX ADMIN — Medication PRN ML: at 16:17

## 2020-02-18 RX ADMIN — SENNOSIDES SCH TAB: 8.6 TABLET, COATED ORAL at 20:37

## 2020-02-18 RX ADMIN — FAMOTIDINE SCH MG: 20 TABLET, FILM COATED ORAL at 08:36

## 2020-02-18 RX ADMIN — LEVETIRACETAM SCH MG: 500 TABLET, FILM COATED ORAL at 20:37

## 2020-02-18 RX ADMIN — ASPIRIN SCH MG: 81 TABLET, CHEWABLE ORAL at 08:34

## 2020-02-18 RX ADMIN — APIXABAN SCH MG: 5 TABLET, FILM COATED ORAL at 08:39

## 2020-02-18 RX ADMIN — PROPOFOL PRN MLS/HR: 10 INJECTION, EMULSION INTRAVENOUS at 08:29

## 2020-02-18 RX ADMIN — DEXTROSE PRN MLS/HR: 5 SOLUTION INTRAVENOUS at 08:41

## 2020-02-18 RX ADMIN — ALBUMIN HUMAN SCH MLS/HR: 250 SOLUTION INTRAVENOUS at 06:21

## 2020-02-18 RX ADMIN — VITAMIN D, TAB 1000IU (100/BT) SCH UNIT: 25 TAB at 08:37

## 2020-02-18 RX ADMIN — ATORVASTATIN CALCIUM SCH MG: 20 TABLET, FILM COATED ORAL at 20:36

## 2020-02-18 RX ADMIN — ALBUMIN HUMAN SCH MLS/HR: 250 SOLUTION INTRAVENOUS at 00:00

## 2020-02-18 RX ADMIN — LEVETIRACETAM SCH MG: 500 TABLET, FILM COATED ORAL at 08:37

## 2020-02-18 RX ADMIN — PIPERACILLIN SODIUM AND TAZOBACTAM SODIUM SCH MLS/HR: .375; 3 INJECTION, POWDER, LYOPHILIZED, FOR SOLUTION INTRAVENOUS at 21:00

## 2020-02-18 RX ADMIN — FUROSEMIDE SCH MG: 20 TABLET ORAL at 08:36

## 2020-02-18 RX ADMIN — GABAPENTIN SCH MG: 100 CAPSULE ORAL at 06:21

## 2020-02-18 RX ADMIN — CYANOCOBALAMIN TAB 1000 MCG SCH MCG: 1000 TAB at 08:36

## 2020-02-18 RX ADMIN — LACTOBACILLUS ACIDOPH-L.BULGARICUS 1 MILLION CELL CHEWABLE TABLET SCH TAB.CHEW: at 17:09

## 2020-02-18 RX ADMIN — PIPERACILLIN SODIUM AND TAZOBACTAM SODIUM SCH MLS/HR: .375; 3 INJECTION, POWDER, LYOPHILIZED, FOR SOLUTION INTRAVENOUS at 08:34

## 2020-02-18 RX ADMIN — FOLIC ACID SCH MG: 1 TABLET ORAL at 08:36

## 2020-02-18 RX ADMIN — AMIODARONE HYDROCHLORIDE SCH MG: 200 TABLET ORAL at 08:35

## 2020-02-18 RX ADMIN — GABAPENTIN SCH MG: 100 CAPSULE ORAL at 13:00

## 2020-02-18 RX ADMIN — DRONABINOL SCH MG: 2.5 CAPSULE ORAL at 08:36

## 2020-02-18 RX ADMIN — LACTOBACILLUS ACIDOPH-L.BULGARICUS 1 MILLION CELL CHEWABLE TABLET SCH TAB.CHEW: at 08:38

## 2020-02-18 RX ADMIN — GABAPENTIN SCH MG: 100 CAPSULE ORAL at 22:11

## 2020-02-18 RX ADMIN — DEXTROSE PRN MLS/HR: 50 INJECTION, SOLUTION INTRAVENOUS at 18:32

## 2020-02-18 RX ADMIN — SODIUM CHLORIDE PRN MLS/HR: 0.9 INJECTION, SOLUTION INTRAVENOUS at 14:20

## 2020-02-18 RX ADMIN — DRONABINOL SCH MG: 2.5 CAPSULE ORAL at 17:09

## 2020-02-18 NOTE — NUR
PULMONARY SERVICES DR. RODRÍGUEZ IN THE UNIT. FULL REPORT GIVEN, SEE ORDER HISTORY FOR NEW 
ORDERS. DR. RODRÍGUEZ AT BEDSIDE ASSESSING PATIENT.

## 2020-02-18 NOTE — NUR
PT HAD CONT. LOOSE STOOL BROWNISH IN COLOR., FLEXISEAL INSERTED. STOOL SPECIMEN SENT TO LAB 
FOR C-DIFF.

## 2020-02-18 NOTE — NUR
Clinical pharmacy note-Vancomycin dosing per pharmacy



Subjective;

To continue Vancomycin dosing on this 84 year old female patient for sepsis



Objective:

BUN 59  Scr 1.8  WBC 15.9  Temp 94

Vanco random level on 2/17 at 0600: 10

Vanco random level on 2/18 at 0600: 14.6

Ht 157.48cm

Wt 43.998kg



Assessment/Plan:

Since renal function is unstable, will start Vancomycin dosing by fall-off random level.

Since vanco random level this am is 14.6 mcg/ml, will give Vancomycin 5000mg iv x1 will be 
given today at 0800 and random will be drawn tomorrow am (on order) for further dosing. Will 
follow daily.

## 2020-02-18 NOTE — NUR
Labs noted; Phos and Mg wnl. K 3.4L, BUN 59H, Cr 1.8H, Ca 8.2L, . TF turned off due 
to 60ml residuals. Per RN, awaiting Rx for Reglan. Active bowel sounds and good palor 
observed.

-------------------------------------------------------------------------------

Addendum: 02/18/20 at 1627 by ANASTASIYA TOLENTINO, MIKKI RD

-------------------------------------------------------------------------------

Amended: Links added.

## 2020-02-18 NOTE — NUR
At this time patient started on versed drip. at the time of scanning medication eMAR 
requested to document medication  in units. pharmacist Jered called and notified.

## 2020-02-18 NOTE — NUR
PT RECEIVED ON CONTINUOUS VENT. TOLERATING CURRENT SETTINGS AT THIS TIME. ET TUBE 7.0 
SECURED AT 23CM LIP LINE VIA ANCHOR FAST. MOVED ET FROM LEFT TO RIGHT TO MID LIP. SUCTION 
SMALL AMOUNT THIN WHITE SECRETIONS. NO DISTRESS NOTED AT THIS TIME. WILL CONTINUE TO 
MONITOR.

## 2020-02-18 NOTE — NUR
RECEIVED PT. ORALLY INTUBATED TO VENT W/ SETTINGS OF AC-15,T-450, FIO2-30%, PEEP-+5 W/ O2 
SAT %. ON VERSED DRIP @ 1MG/HR VIA PICC LINE ON LUCRETIA. ON  LEVOPHED DRIP @ 2MCQ/MIN. IVF 
NS @ 50CC/HR. TEMP-97.9 ORALLY. NGT ON R NARES, CHECKED PLACEMENT, CHECKED RESIDUAL 120CC 
NOTED CONT. TO HOLD TUBE FDG. REPOSITIONED W/ HOD ELEVATED.

## 2020-02-18 NOTE — NUR
Received critical lab notification for Hemoglobin/hematocrit at 6.2/19.5. Called and spoke 
with JAMILA Alvares with order to transfuse 1 unit of RBCs.

## 2020-02-18 NOTE — NUR
CARDIOLOGY SERVICES IN THE UNIT DR LEAL, FULL REPORT GIVEN SEE ORDERHISTORY FOR NEW 
ORDERS. DR LEAL AT THE [facility] DISCUSSING PLAN OF CARE WITH PATIENT AND DAUGHTER.

## 2020-02-18 NOTE — NUR
A call to attending physician, Dr. Hughes to notify the need of  versed or fentanyl for 
sedation since an order to dcd propofol received from cardiologist Dr. Gutierrez. due to QT 
prolongation. Also orders to completely stop reglan received. Awaiting call back.

## 2020-02-19 VITALS — DIASTOLIC BLOOD PRESSURE: 54 MMHG | SYSTOLIC BLOOD PRESSURE: 106 MMHG

## 2020-02-19 VITALS — DIASTOLIC BLOOD PRESSURE: 46 MMHG | SYSTOLIC BLOOD PRESSURE: 94 MMHG

## 2020-02-19 VITALS — DIASTOLIC BLOOD PRESSURE: 47 MMHG | SYSTOLIC BLOOD PRESSURE: 99 MMHG

## 2020-02-19 VITALS — SYSTOLIC BLOOD PRESSURE: 99 MMHG | DIASTOLIC BLOOD PRESSURE: 47 MMHG

## 2020-02-19 VITALS — SYSTOLIC BLOOD PRESSURE: 114 MMHG | DIASTOLIC BLOOD PRESSURE: 59 MMHG

## 2020-02-19 VITALS — SYSTOLIC BLOOD PRESSURE: 115 MMHG | DIASTOLIC BLOOD PRESSURE: 58 MMHG

## 2020-02-19 VITALS — SYSTOLIC BLOOD PRESSURE: 105 MMHG | DIASTOLIC BLOOD PRESSURE: 56 MMHG

## 2020-02-19 VITALS — SYSTOLIC BLOOD PRESSURE: 110 MMHG | DIASTOLIC BLOOD PRESSURE: 57 MMHG

## 2020-02-19 VITALS — SYSTOLIC BLOOD PRESSURE: 93 MMHG | DIASTOLIC BLOOD PRESSURE: 48 MMHG

## 2020-02-19 VITALS — SYSTOLIC BLOOD PRESSURE: 101 MMHG | DIASTOLIC BLOOD PRESSURE: 46 MMHG

## 2020-02-19 VITALS — SYSTOLIC BLOOD PRESSURE: 111 MMHG | DIASTOLIC BLOOD PRESSURE: 55 MMHG

## 2020-02-19 VITALS — DIASTOLIC BLOOD PRESSURE: 60 MMHG | SYSTOLIC BLOOD PRESSURE: 117 MMHG

## 2020-02-19 VITALS — DIASTOLIC BLOOD PRESSURE: 59 MMHG | SYSTOLIC BLOOD PRESSURE: 116 MMHG

## 2020-02-19 VITALS — SYSTOLIC BLOOD PRESSURE: 93 MMHG | DIASTOLIC BLOOD PRESSURE: 49 MMHG

## 2020-02-19 VITALS — SYSTOLIC BLOOD PRESSURE: 90 MMHG | DIASTOLIC BLOOD PRESSURE: 49 MMHG

## 2020-02-19 VITALS — SYSTOLIC BLOOD PRESSURE: 89 MMHG | DIASTOLIC BLOOD PRESSURE: 49 MMHG

## 2020-02-19 VITALS — SYSTOLIC BLOOD PRESSURE: 116 MMHG | DIASTOLIC BLOOD PRESSURE: 59 MMHG

## 2020-02-19 VITALS — SYSTOLIC BLOOD PRESSURE: 115 MMHG | DIASTOLIC BLOOD PRESSURE: 54 MMHG

## 2020-02-19 VITALS — DIASTOLIC BLOOD PRESSURE: 49 MMHG | SYSTOLIC BLOOD PRESSURE: 100 MMHG

## 2020-02-19 VITALS — DIASTOLIC BLOOD PRESSURE: 50 MMHG | SYSTOLIC BLOOD PRESSURE: 102 MMHG

## 2020-02-19 VITALS — DIASTOLIC BLOOD PRESSURE: 56 MMHG | SYSTOLIC BLOOD PRESSURE: 110 MMHG

## 2020-02-19 VITALS — SYSTOLIC BLOOD PRESSURE: 96 MMHG | DIASTOLIC BLOOD PRESSURE: 51 MMHG

## 2020-02-19 VITALS — DIASTOLIC BLOOD PRESSURE: 61 MMHG | SYSTOLIC BLOOD PRESSURE: 116 MMHG

## 2020-02-19 VITALS — DIASTOLIC BLOOD PRESSURE: 56 MMHG | SYSTOLIC BLOOD PRESSURE: 107 MMHG

## 2020-02-19 VITALS — DIASTOLIC BLOOD PRESSURE: 60 MMHG | SYSTOLIC BLOOD PRESSURE: 118 MMHG

## 2020-02-19 VITALS — SYSTOLIC BLOOD PRESSURE: 103 MMHG | DIASTOLIC BLOOD PRESSURE: 51 MMHG

## 2020-02-19 VITALS — SYSTOLIC BLOOD PRESSURE: 111 MMHG | DIASTOLIC BLOOD PRESSURE: 56 MMHG

## 2020-02-19 VITALS — SYSTOLIC BLOOD PRESSURE: 115 MMHG | DIASTOLIC BLOOD PRESSURE: 57 MMHG

## 2020-02-19 VITALS — SYSTOLIC BLOOD PRESSURE: 117 MMHG | DIASTOLIC BLOOD PRESSURE: 57 MMHG

## 2020-02-19 VITALS — SYSTOLIC BLOOD PRESSURE: 114 MMHG | DIASTOLIC BLOOD PRESSURE: 58 MMHG

## 2020-02-19 VITALS — DIASTOLIC BLOOD PRESSURE: 43 MMHG | SYSTOLIC BLOOD PRESSURE: 99 MMHG

## 2020-02-19 VITALS — DIASTOLIC BLOOD PRESSURE: 57 MMHG | SYSTOLIC BLOOD PRESSURE: 111 MMHG

## 2020-02-19 LAB
AMORPH URATE CRY URNS QL MICRO: (no result) /HPF
APPEARANCE UR: (no result)
BASE EXCESS BLDA CALC-SCNC: -8.5 MMOL/L
BASOPHILS # BLD AUTO: 0 K/UL (ref 0–8)
BASOPHILS NFR BLD AUTO: 0 % (ref 0–2)
BILIRUB UR QL STRIP: NEGATIVE
BUN SERPL-MCNC: 56 MG/DL (ref 7–18)
CHLORIDE SERPL-SCNC: 105 MMOL/L (ref 98–107)
CO2 SERPL-SCNC: 20 MMOL/L (ref 21–32)
COLOR UR: YELLOW
CREAT SERPL-MCNC: 1.9 MG/DL (ref 0.6–1.3)
DEPRECATED SQUAMOUS URNS QL MICRO: (no result) /HPF
EOSINOPHIL # BLD AUTO: 0 K/UL (ref 0–0.7)
EOSINOPHIL NFR BLD AUTO: 0.2 % (ref 0–7)
GLUCOSE SERPL-MCNC: 70 MG/DL (ref 74–106)
GLUCOSE UR STRIP-MCNC: NEGATIVE MG/DL
HCO3 BLDA-SCNC: 16.5 MMOL/L
HCT VFR BLD AUTO: 23.3 % (ref 31.2–41.9)
HGB BLD-MCNC: 7.5 G/DL (ref 10.9–14.3)
HGB BLDA OXIMETRY-MCNC: 8.3 G/DL (ref 12–16)
HGB UR QL STRIP: (no result)
INHALED O2 CONCENTRATION: 30 %
INTRINSIC PEEP RESPIRATORY: 5 CMH20
KETONES UR STRIP-MCNC: NEGATIVE MG/DL
LEUKOCYTE ESTERASE UR QL STRIP: (no result)
LYMPHOCYTES # BLD AUTO: 0.8 K/UL (ref 20–40)
LYMPHOCYTES NFR BLD AUTO: 5.7 % (ref 20.5–51.5)
MAGNESIUM SERPL-MCNC: 2.1 MG/DL (ref 1.8–2.4)
MCH RBC QN AUTO: 29.7 UUG (ref 24.7–32.8)
MCHC RBC AUTO-ENTMCNC: 32 G/DL (ref 32.3–35.6)
MCV RBC AUTO: 92.2 FL (ref 75.5–95.3)
MONOCYTES # BLD AUTO: 0.2 K/UL (ref 2–10)
MONOCYTES NFR BLD AUTO: 1.1 % (ref 0–11)
NEUTROPHILS # BLD AUTO: 13 K/UL (ref 1.8–8.9)
NEUTROPHILS NFR BLD AUTO: 93 % (ref 38.5–71.5)
NITRITE UR QL STRIP: NEGATIVE
PCO2 TEMP ADJ BLDA: 32 MMHG (ref 35–45)
PEEP SETTING VENT: 450 ML
PH TEMP ADJ BLDA: 7.33 [PH] (ref 7.35–7.45)
PH UR STRIP: 5 [PH] (ref 5–8)
PHOSPHATE SERPL-MCNC: 3.2 MG/DL (ref 2.5–4.9)
PLATELET # BLD AUTO: 102 K/UL (ref 179–408)
PO2 TEMP ADJ BLDA: 81.1 MMHG (ref 75–100)
POTASSIUM SERPL-SCNC: 3.5 MMOL/L (ref 3.5–5.1)
RBC # BLD AUTO: 2.53 MIL/UL (ref 3.63–4.92)
RBC #/AREA URNS HPF: (no result) /HPF (ref 0–3)
SET RATE, BG: 15
SP GR UR STRIP: 1.01 (ref 1–1.03)
SPECIMEN DRAWN FROM PATIENT: (no result)
UROBILINOGEN UR STRIP-MCNC: 0.2 E.U./DL
VANCOMYCIN SERPL-MCNC: 20.7 UG/ML (ref 18–26)
VENTILATION MODE VENT: (no result)
WBC # BLD AUTO: 13.9 K/UL (ref 3.8–11.8)
WBC #/AREA URNS HPF: (no result) /HPF
WBC #/AREA URNS HPF: (no result) /HPF (ref 0–3)

## 2020-02-19 RX ADMIN — FOLIC ACID SCH MG: 1 TABLET ORAL at 08:41

## 2020-02-19 RX ADMIN — CYANOCOBALAMIN TAB 1000 MCG SCH MCG: 1000 TAB at 08:41

## 2020-02-19 RX ADMIN — DRONABINOL SCH MG: 2.5 CAPSULE ORAL at 17:32

## 2020-02-19 RX ADMIN — DEXTROSE AND SODIUM CHLORIDE PRN MLS/HR: 5; .9 INJECTION, SOLUTION INTRAVENOUS at 10:20

## 2020-02-19 RX ADMIN — GABAPENTIN SCH MG: 100 CAPSULE ORAL at 05:20

## 2020-02-19 RX ADMIN — SENNOSIDES SCH TAB: 8.6 TABLET, COATED ORAL at 20:18

## 2020-02-19 RX ADMIN — DEXTROSE SCH MLS/HR: 50 INJECTION, SOLUTION INTRAVENOUS at 20:16

## 2020-02-19 RX ADMIN — LACTOBACILLUS ACIDOPH-L.BULGARICUS 1 MILLION CELL CHEWABLE TABLET SCH TAB.CHEW: at 08:41

## 2020-02-19 RX ADMIN — VANCOMYCIN HYDROCHLORIDE SCH MG: 500 INJECTION, POWDER, LYOPHILIZED, FOR SOLUTION INTRAVENOUS at 14:32

## 2020-02-19 RX ADMIN — LACTOBACILLUS ACIDOPH-L.BULGARICUS 1 MILLION CELL CHEWABLE TABLET SCH TAB.CHEW: at 17:32

## 2020-02-19 RX ADMIN — ATORVASTATIN CALCIUM SCH MG: 20 TABLET, FILM COATED ORAL at 20:18

## 2020-02-19 RX ADMIN — ASPIRIN SCH MG: 81 TABLET, CHEWABLE ORAL at 08:41

## 2020-02-19 RX ADMIN — LEVETIRACETAM SCH MG: 500 TABLET, FILM COATED ORAL at 08:41

## 2020-02-19 RX ADMIN — VANCOMYCIN HYDROCHLORIDE SCH MG: 500 INJECTION, POWDER, LYOPHILIZED, FOR SOLUTION INTRAVENOUS at 20:16

## 2020-02-19 RX ADMIN — FAMOTIDINE SCH MG: 20 TABLET, FILM COATED ORAL at 08:41

## 2020-02-19 RX ADMIN — GABAPENTIN SCH MG: 100 CAPSULE ORAL at 21:11

## 2020-02-19 RX ADMIN — LEVETIRACETAM SCH MG: 500 TABLET, FILM COATED ORAL at 20:19

## 2020-02-19 RX ADMIN — VITAMIN D, TAB 1000IU (100/BT) SCH UNIT: 25 TAB at 08:42

## 2020-02-19 RX ADMIN — DRONABINOL SCH MG: 2.5 CAPSULE ORAL at 08:41

## 2020-02-19 RX ADMIN — VANCOMYCIN HYDROCHLORIDE SCH MG: 500 INJECTION, POWDER, LYOPHILIZED, FOR SOLUTION INTRAVENOUS at 08:41

## 2020-02-19 RX ADMIN — GABAPENTIN SCH MG: 100 CAPSULE ORAL at 14:32

## 2020-02-19 RX ADMIN — DEXTROSE PRN MLS/HR: 50 INJECTION, SOLUTION INTRAVENOUS at 17:59

## 2020-02-19 RX ADMIN — PIPERACILLIN SODIUM AND TAZOBACTAM SODIUM SCH MLS/HR: .375; 3 INJECTION, POWDER, LYOPHILIZED, FOR SOLUTION INTRAVENOUS at 08:41

## 2020-02-19 NOTE — NUR
WOUND CARE CONSULT    WOUND CARE RECEIVED CONSULT FOR SACRAL PRESSURE SORE.  WOUND CARE WILL 
DEFER CONSULT AND TREATMENT PLANS TO PLASTIC SURGICAL TEAM WHO ARE CURRENTLY FOLLOWING THIS 
PATIENT.  PATIENT WITH MAGDALENO AT 10, ALL PRESSURE ULCER PREVENTION MEASURES ARE NOTED TO BE 
IN PLACE AT THIS TIME.  WILL SEE PRN.

## 2020-02-19 NOTE — NUR
Clinical pharmacy note-Vancomycin dosing per pharmacy



Subjective;

To continue Vancomycin dosing on this 84 year old female patient for sepsis



Objective:

BUN 36  Scr 1.9  WBC 13.9  Temp 98.2

Vanco random level on 2/17 at 0600: 10

Vanco random level on 2/18 at 0600: 14.6

Vanco random level on 2/19 at 0600: 20.7

Ht 157.48cm

Wt 43.998kg



Assessment/Plan:

Since renal function is unstable, will continue Vancomycin dosing by fall-off random level.

Since vanco random level this am is 20.7 mcg/ml, no dose shall be given today. will give 
Vancomycin 5000mg iv x1 tomorrow at 0600. Pharmacy shall review srcr on 2/21 am & decide 
when to order next random for further dosing. Will follow daily.

## 2020-02-19 NOTE — NUR
HOSPITALIST DR. MADISON IN THE UNIT, FULL REPORT GIVEN TO DR MADISON, SEE ORDER HISTORY FOR NEW 
ORDERS. DR MADISON AT BEDSIDE ASSESSING PATIENT.

## 2020-02-19 NOTE — NUR
CARDIOLOGY SERVICES DR LEAL IN THE UNIT. FULL REPORT GIVEN TO DR LEAL, SEE ORDER 
HISTORY FOR NEW ORDERS. DR LEAL AT BEDSIDE ASSESSING PATIENT.

## 2020-02-19 NOTE — NUR
rounds made patient in bed ,orally intubated ac mode on the vent settings ,tolerating vent 
setting rr 18 saturation 100% suction via ett and via mouth thin to thick secretions 
moderate in amt .on versed 2 mg/hr for sedation via the right upper arm picc line when off 
sedation open eyes but doesn't follow commands  .f/c to bsd with yellowish urine .c diff 
isolation observed.continue to monitor v/s and levels of sedation .

## 2020-02-19 NOTE — NUR
PULMONOLOGY SERVICES DR. RODRÍGUEZ IN THE UNIT. FULL REPORT GIVEN TO DR. RODRÍGUEZ, SEE ORDER 
HISTORY FOR NEW ORDERS. DR. RODRÍGUEZ AT BEDSIDE ASSESSING PATIENT.

## 2020-02-20 VITALS — DIASTOLIC BLOOD PRESSURE: 63 MMHG | SYSTOLIC BLOOD PRESSURE: 116 MMHG

## 2020-02-20 VITALS — DIASTOLIC BLOOD PRESSURE: 66 MMHG | SYSTOLIC BLOOD PRESSURE: 124 MMHG

## 2020-02-20 VITALS — SYSTOLIC BLOOD PRESSURE: 117 MMHG | DIASTOLIC BLOOD PRESSURE: 80 MMHG

## 2020-02-20 VITALS — DIASTOLIC BLOOD PRESSURE: 65 MMHG | SYSTOLIC BLOOD PRESSURE: 124 MMHG

## 2020-02-20 VITALS — SYSTOLIC BLOOD PRESSURE: 124 MMHG | DIASTOLIC BLOOD PRESSURE: 66 MMHG

## 2020-02-20 VITALS — SYSTOLIC BLOOD PRESSURE: 125 MMHG | DIASTOLIC BLOOD PRESSURE: 66 MMHG

## 2020-02-20 VITALS — SYSTOLIC BLOOD PRESSURE: 112 MMHG | DIASTOLIC BLOOD PRESSURE: 61 MMHG

## 2020-02-20 VITALS — DIASTOLIC BLOOD PRESSURE: 62 MMHG | SYSTOLIC BLOOD PRESSURE: 117 MMHG

## 2020-02-20 VITALS — SYSTOLIC BLOOD PRESSURE: 118 MMHG | DIASTOLIC BLOOD PRESSURE: 62 MMHG

## 2020-02-20 VITALS — DIASTOLIC BLOOD PRESSURE: 62 MMHG | SYSTOLIC BLOOD PRESSURE: 109 MMHG

## 2020-02-20 VITALS — DIASTOLIC BLOOD PRESSURE: 60 MMHG | SYSTOLIC BLOOD PRESSURE: 104 MMHG

## 2020-02-20 VITALS — SYSTOLIC BLOOD PRESSURE: 121 MMHG | DIASTOLIC BLOOD PRESSURE: 62 MMHG

## 2020-02-20 VITALS — SYSTOLIC BLOOD PRESSURE: 106 MMHG | DIASTOLIC BLOOD PRESSURE: 62 MMHG

## 2020-02-20 VITALS — SYSTOLIC BLOOD PRESSURE: 113 MMHG | DIASTOLIC BLOOD PRESSURE: 59 MMHG

## 2020-02-20 VITALS — SYSTOLIC BLOOD PRESSURE: 103 MMHG | DIASTOLIC BLOOD PRESSURE: 61 MMHG

## 2020-02-20 VITALS — DIASTOLIC BLOOD PRESSURE: 69 MMHG | SYSTOLIC BLOOD PRESSURE: 132 MMHG

## 2020-02-20 VITALS — DIASTOLIC BLOOD PRESSURE: 61 MMHG | SYSTOLIC BLOOD PRESSURE: 112 MMHG

## 2020-02-20 VITALS — DIASTOLIC BLOOD PRESSURE: 65 MMHG | SYSTOLIC BLOOD PRESSURE: 116 MMHG

## 2020-02-20 VITALS — DIASTOLIC BLOOD PRESSURE: 67 MMHG | SYSTOLIC BLOOD PRESSURE: 136 MMHG

## 2020-02-20 VITALS — SYSTOLIC BLOOD PRESSURE: 117 MMHG | DIASTOLIC BLOOD PRESSURE: 62 MMHG

## 2020-02-20 VITALS — SYSTOLIC BLOOD PRESSURE: 111 MMHG | DIASTOLIC BLOOD PRESSURE: 59 MMHG

## 2020-02-20 VITALS — SYSTOLIC BLOOD PRESSURE: 116 MMHG | DIASTOLIC BLOOD PRESSURE: 65 MMHG

## 2020-02-20 VITALS — SYSTOLIC BLOOD PRESSURE: 110 MMHG | DIASTOLIC BLOOD PRESSURE: 59 MMHG

## 2020-02-20 LAB
APPEARANCE UR: CLEAR
BASE EXCESS BLDA CALC-SCNC: -7.7 MMOL/L
BASOPHILS # BLD AUTO: 0 K/UL (ref 0–8)
BASOPHILS NFR BLD AUTO: 0 % (ref 0–2)
BILIRUB UR QL STRIP: NEGATIVE
BIPAP ANDOR CPAP SETTING VENT: 10 CMH20
BUN SERPL-MCNC: 51 MG/DL (ref 7–18)
CHLORIDE SERPL-SCNC: 108 MMOL/L (ref 98–107)
CO2 SERPL-SCNC: 19 MMOL/L (ref 21–32)
COLOR UR: YELLOW
CREAT SERPL-MCNC: 2 MG/DL (ref 0.6–1.3)
CREAT UR-MCNC: < 13 MG/DL (ref 30–125)
EOSINOPHIL # BLD AUTO: 0 K/UL (ref 0–0.7)
EOSINOPHIL NFR BLD AUTO: 0.3 % (ref 0–7)
GLUCOSE SERPL-MCNC: 82 MG/DL (ref 74–106)
GLUCOSE UR STRIP-MCNC: NEGATIVE MG/DL
HCO3 BLDA-SCNC: 16.6 MMOL/L
HCT VFR BLD AUTO: 25.8 % (ref 31.2–41.9)
HGB BLD-MCNC: 8.4 G/DL (ref 10.9–14.3)
HGB BLDA OXIMETRY-MCNC: 9.1 G/DL (ref 12–16)
HGB UR QL STRIP: NEGATIVE
INHALED O2 CONCENTRATION: 30 %
KETONES UR STRIP-MCNC: NEGATIVE MG/DL
LEUKOCYTE ESTERASE UR QL STRIP: NEGATIVE
LYMPHOCYTES # BLD AUTO: 0.9 K/UL (ref 20–40)
LYMPHOCYTES NFR BLD AUTO: 7.9 % (ref 20.5–51.5)
MAGNESIUM SERPL-MCNC: 2 MG/DL (ref 1.8–2.4)
MCH RBC QN AUTO: 30 UUG (ref 24.7–32.8)
MCHC RBC AUTO-ENTMCNC: 33 G/DL (ref 32.3–35.6)
MCV RBC AUTO: 91.8 FL (ref 75.5–95.3)
MONOCYTES # BLD AUTO: 0.2 K/UL (ref 2–10)
MONOCYTES NFR BLD AUTO: 1.3 % (ref 0–11)
NEUTROPHILS # BLD AUTO: 10.7 K/UL (ref 1.8–8.9)
NEUTROPHILS NFR BLD AUTO: 90.5 % (ref 38.5–71.5)
NITRITE UR QL STRIP: NEGATIVE
PCO2 TEMP ADJ BLDA: 29.3 MMHG (ref 35–45)
PH TEMP ADJ BLDA: 7.37 [PH] (ref 7.35–7.45)
PH UR STRIP: 5 [PH] (ref 5–8)
PHOSPHATE SERPL-MCNC: 3.7 MG/DL (ref 2.5–4.9)
PLATELET # BLD AUTO: 114 K/UL (ref 179–408)
PO2 TEMP ADJ BLDA: 88.6 MMHG (ref 75–100)
POTASSIUM SERPL-SCNC: 4 MMOL/L (ref 3.5–5.1)
PROT UR-MCNC: 38.2 MG/DL
RBC # BLD AUTO: 2.81 MIL/UL (ref 3.63–4.92)
SP GR UR STRIP: <=1.005 (ref 1–1.03)
SPECIMEN DRAWN FROM PATIENT: (no result)
UROBILINOGEN UR STRIP-MCNC: 0.2 E.U./DL
VENTILATION MODE VENT: (no result)
WBC # BLD AUTO: 11.8 K/UL (ref 3.8–11.8)

## 2020-02-20 RX ADMIN — VANCOMYCIN HYDROCHLORIDE SCH MG: 500 INJECTION, POWDER, LYOPHILIZED, FOR SOLUTION INTRAVENOUS at 08:14

## 2020-02-20 RX ADMIN — DRONABINOL SCH MG: 2.5 CAPSULE ORAL at 08:13

## 2020-02-20 RX ADMIN — ASPIRIN SCH MG: 81 TABLET, CHEWABLE ORAL at 08:13

## 2020-02-20 RX ADMIN — LACTOBACILLUS ACIDOPH-L.BULGARICUS 1 MILLION CELL CHEWABLE TABLET SCH TAB.CHEW: at 16:30

## 2020-02-20 RX ADMIN — VITAMIN D, TAB 1000IU (100/BT) SCH UNIT: 25 TAB at 08:49

## 2020-02-20 RX ADMIN — DRONABINOL SCH MG: 2.5 CAPSULE ORAL at 16:30

## 2020-02-20 RX ADMIN — CYANOCOBALAMIN TAB 1000 MCG SCH MCG: 1000 TAB at 08:49

## 2020-02-20 RX ADMIN — ATORVASTATIN CALCIUM SCH MG: 20 TABLET, FILM COATED ORAL at 21:55

## 2020-02-20 RX ADMIN — VANCOMYCIN HYDROCHLORIDE SCH MG: 500 INJECTION, POWDER, LYOPHILIZED, FOR SOLUTION INTRAVENOUS at 19:40

## 2020-02-20 RX ADMIN — VANCOMYCIN HYDROCHLORIDE SCH MG: 500 INJECTION, POWDER, LYOPHILIZED, FOR SOLUTION INTRAVENOUS at 02:29

## 2020-02-20 RX ADMIN — LEVETIRACETAM SCH MG: 500 TABLET, FILM COATED ORAL at 08:13

## 2020-02-20 RX ADMIN — DEXTROSE AND SODIUM CHLORIDE PRN MLS/HR: 5; .9 INJECTION, SOLUTION INTRAVENOUS at 04:37

## 2020-02-20 RX ADMIN — VANCOMYCIN HYDROCHLORIDE SCH MG: 500 INJECTION, POWDER, LYOPHILIZED, FOR SOLUTION INTRAVENOUS at 13:28

## 2020-02-20 RX ADMIN — SENNOSIDES SCH TAB: 8.6 TABLET, COATED ORAL at 21:57

## 2020-02-20 RX ADMIN — FOLIC ACID SCH MG: 1 TABLET ORAL at 08:13

## 2020-02-20 RX ADMIN — GABAPENTIN SCH MG: 100 CAPSULE ORAL at 13:27

## 2020-02-20 RX ADMIN — GABAPENTIN SCH MG: 100 CAPSULE ORAL at 05:25

## 2020-02-20 RX ADMIN — LEVETIRACETAM SCH MG: 500 TABLET, FILM COATED ORAL at 21:55

## 2020-02-20 RX ADMIN — GABAPENTIN SCH MG: 100 CAPSULE ORAL at 21:55

## 2020-02-20 RX ADMIN — LACTOBACILLUS ACIDOPH-L.BULGARICUS 1 MILLION CELL CHEWABLE TABLET SCH TAB.CHEW: at 08:13

## 2020-02-20 RX ADMIN — DEXTROSE SCH MLS/HR: 50 INJECTION, SOLUTION INTRAVENOUS at 21:55

## 2020-02-20 RX ADMIN — FAMOTIDINE SCH MG: 20 TABLET, FILM COATED ORAL at 08:13

## 2020-02-20 NOTE — NUR
Pt placed on CPAP PS 10, 30% FiO2 by respiratory therapist as ordered by pulmonary MD. PIPER schwartzl.

## 2020-02-20 NOTE — NUR
PER MD ORDER PT PLACED ON CPAP PSV 10. RN MELINDA AWARE. SEDATION TURNED OFF. VITALS WITHIN 
NORMAL LIMITS AND PT TOLERATING WEANING FINE. WILL CONTINUE TO MONITOR

## 2020-02-20 NOTE — NUR
PATIENT on cont olivares vent with 7.0 et/tube in place and 23cm lip line with anchor fast in 
place and secured, with no vent changes made, all vent alarms good, no vent changes made, pt 
does assist at times, suctioned whitish tinged secretions, and oral care done, ambu bag at 
bedside, weaning on cpap trial on day shift. ISIS NASSAR RCP

-------------------------------------------------------------------------------

Addendum: 02/20/20 at 0325 by BLAKE NASSAR RT

-------------------------------------------------------------------------------

Amended: Links added.

## 2020-02-20 NOTE — NUR
RECEIVED PT IS NONRESPONSIVE TO NOXIOUS STIMULI. ORALLY INTUBATED TO VENT ON CPAP, PSV-10, 
FIO2-30% W/ O2 SAT %.. NGT ON R NARE, CHECKED PLACEMENT & CHECKED RESIDUAL 5CC 
NOTED.IVF D5NS @ 50CC/HR PICC LINE ON LUCRETIA. REPOSITIONED W/ HOB ELEVATED.

## 2020-02-20 NOTE — NUR
am care done bath patient ,changed soiled linens and gown . turned and reposition .f/c done 
,suction via ett and via mouth .

## 2020-02-21 VITALS — DIASTOLIC BLOOD PRESSURE: 58 MMHG | SYSTOLIC BLOOD PRESSURE: 115 MMHG

## 2020-02-21 VITALS — SYSTOLIC BLOOD PRESSURE: 132 MMHG | DIASTOLIC BLOOD PRESSURE: 70 MMHG

## 2020-02-21 VITALS — SYSTOLIC BLOOD PRESSURE: 140 MMHG | DIASTOLIC BLOOD PRESSURE: 71 MMHG

## 2020-02-21 VITALS — SYSTOLIC BLOOD PRESSURE: 128 MMHG | DIASTOLIC BLOOD PRESSURE: 66 MMHG

## 2020-02-21 VITALS — SYSTOLIC BLOOD PRESSURE: 99 MMHG | DIASTOLIC BLOOD PRESSURE: 57 MMHG

## 2020-02-21 VITALS — SYSTOLIC BLOOD PRESSURE: 140 MMHG | DIASTOLIC BLOOD PRESSURE: 76 MMHG

## 2020-02-21 VITALS — DIASTOLIC BLOOD PRESSURE: 73 MMHG | SYSTOLIC BLOOD PRESSURE: 140 MMHG

## 2020-02-21 VITALS — SYSTOLIC BLOOD PRESSURE: 126 MMHG | DIASTOLIC BLOOD PRESSURE: 66 MMHG

## 2020-02-21 VITALS — SYSTOLIC BLOOD PRESSURE: 106 MMHG | DIASTOLIC BLOOD PRESSURE: 54 MMHG

## 2020-02-21 VITALS — SYSTOLIC BLOOD PRESSURE: 127 MMHG | DIASTOLIC BLOOD PRESSURE: 67 MMHG

## 2020-02-21 VITALS — DIASTOLIC BLOOD PRESSURE: 73 MMHG | SYSTOLIC BLOOD PRESSURE: 134 MMHG

## 2020-02-21 VITALS — SYSTOLIC BLOOD PRESSURE: 143 MMHG | DIASTOLIC BLOOD PRESSURE: 66 MMHG

## 2020-02-21 VITALS — SYSTOLIC BLOOD PRESSURE: 109 MMHG | DIASTOLIC BLOOD PRESSURE: 58 MMHG

## 2020-02-21 VITALS — SYSTOLIC BLOOD PRESSURE: 105 MMHG | DIASTOLIC BLOOD PRESSURE: 54 MMHG

## 2020-02-21 VITALS — DIASTOLIC BLOOD PRESSURE: 66 MMHG | SYSTOLIC BLOOD PRESSURE: 119 MMHG

## 2020-02-21 VITALS — DIASTOLIC BLOOD PRESSURE: 69 MMHG | SYSTOLIC BLOOD PRESSURE: 126 MMHG

## 2020-02-21 VITALS — DIASTOLIC BLOOD PRESSURE: 70 MMHG | SYSTOLIC BLOOD PRESSURE: 135 MMHG

## 2020-02-21 VITALS — SYSTOLIC BLOOD PRESSURE: 119 MMHG | DIASTOLIC BLOOD PRESSURE: 60 MMHG

## 2020-02-21 VITALS — DIASTOLIC BLOOD PRESSURE: 58 MMHG | SYSTOLIC BLOOD PRESSURE: 117 MMHG

## 2020-02-21 VITALS — SYSTOLIC BLOOD PRESSURE: 134 MMHG | DIASTOLIC BLOOD PRESSURE: 69 MMHG

## 2020-02-21 VITALS — DIASTOLIC BLOOD PRESSURE: 60 MMHG | SYSTOLIC BLOOD PRESSURE: 108 MMHG

## 2020-02-21 VITALS — SYSTOLIC BLOOD PRESSURE: 143 MMHG | DIASTOLIC BLOOD PRESSURE: 63 MMHG

## 2020-02-21 LAB
BASE EXCESS BLDA CALC-SCNC: -7.9 MMOL/L
BASOPHILS # BLD AUTO: 0 K/UL (ref 0–8)
BASOPHILS NFR BLD AUTO: 0.1 % (ref 0–2)
BIPAP ANDOR CPAP SETTING VENT: 10 CMH20
BUN SERPL-MCNC: 48 MG/DL (ref 7–18)
CHLORIDE SERPL-SCNC: 109 MMOL/L (ref 98–107)
CO2 SERPL-SCNC: 19 MMOL/L (ref 21–32)
CREAT SERPL-MCNC: 2.1 MG/DL (ref 0.6–1.3)
EOSINOPHIL # BLD AUTO: 0 K/UL (ref 0–0.7)
EOSINOPHIL NFR BLD AUTO: 0.3 % (ref 0–7)
GLUCOSE SERPL-MCNC: 95 MG/DL (ref 74–106)
HCO3 BLDA-SCNC: 17.2 MMOL/L
HCT VFR BLD AUTO: 25.6 % (ref 31.2–41.9)
HGB BLD-MCNC: 8.2 G/DL (ref 10.9–14.3)
HGB BLDA OXIMETRY-MCNC: 8.8 G/DL (ref 12–16)
INHALED O2 CONCENTRATION: 30 %
LYMPHOCYTES # BLD AUTO: 0.9 K/UL (ref 20–40)
LYMPHOCYTES NFR BLD AUTO: 8.4 % (ref 20.5–51.5)
MAGNESIUM SERPL-MCNC: 2 MG/DL (ref 1.8–2.4)
MCH RBC QN AUTO: 29.5 UUG (ref 24.7–32.8)
MCHC RBC AUTO-ENTMCNC: 32 G/DL (ref 32.3–35.6)
MCV RBC AUTO: 92.1 FL (ref 75.5–95.3)
MONOCYTES # BLD AUTO: 0.2 K/UL (ref 2–10)
MONOCYTES NFR BLD AUTO: 1.7 % (ref 0–11)
NEUTROPHILS # BLD AUTO: 10.1 K/UL (ref 1.8–8.9)
NEUTROPHILS NFR BLD AUTO: 89.5 % (ref 38.5–71.5)
PCO2 TEMP ADJ BLDA: 33.4 MMHG (ref 35–45)
PH TEMP ADJ BLDA: 7.33 [PH] (ref 7.35–7.45)
PHOSPHATE SERPL-MCNC: 4.9 MG/DL (ref 2.5–4.9)
PLATELET # BLD AUTO: 116 K/UL (ref 179–408)
PO2 TEMP ADJ BLDA: 96.5 MMHG (ref 75–100)
POTASSIUM SERPL-SCNC: 3.9 MMOL/L (ref 3.5–5.1)
RBC # BLD AUTO: 2.78 MIL/UL (ref 3.63–4.92)
SPECIMEN DRAWN FROM PATIENT: (no result)
VENTILATION MODE VENT: (no result)
WBC # BLD AUTO: 11.3 K/UL (ref 3.8–11.8)

## 2020-02-21 RX ADMIN — VANCOMYCIN HYDROCHLORIDE SCH MG: 500 INJECTION, POWDER, LYOPHILIZED, FOR SOLUTION INTRAVENOUS at 13:41

## 2020-02-21 RX ADMIN — DEXTROSE SCH MLS/HR: 50 INJECTION, SOLUTION INTRAVENOUS at 20:19

## 2020-02-21 RX ADMIN — SENNOSIDES SCH TAB: 8.6 TABLET, COATED ORAL at 20:20

## 2020-02-21 RX ADMIN — DEXTROSE AND SODIUM CHLORIDE PRN MLS/HR: 5; .9 INJECTION, SOLUTION INTRAVENOUS at 00:15

## 2020-02-21 RX ADMIN — LEVETIRACETAM SCH MG: 500 TABLET, FILM COATED ORAL at 07:42

## 2020-02-21 RX ADMIN — VANCOMYCIN HYDROCHLORIDE SCH MG: 500 INJECTION, POWDER, LYOPHILIZED, FOR SOLUTION INTRAVENOUS at 20:19

## 2020-02-21 RX ADMIN — VANCOMYCIN HYDROCHLORIDE SCH MG: 500 INJECTION, POWDER, LYOPHILIZED, FOR SOLUTION INTRAVENOUS at 01:19

## 2020-02-21 RX ADMIN — FOLIC ACID SCH MG: 1 TABLET ORAL at 07:42

## 2020-02-21 RX ADMIN — GABAPENTIN SCH MG: 100 CAPSULE ORAL at 22:35

## 2020-02-21 RX ADMIN — ATORVASTATIN CALCIUM SCH MG: 20 TABLET, FILM COATED ORAL at 20:19

## 2020-02-21 RX ADMIN — FAMOTIDINE SCH MG: 20 TABLET, FILM COATED ORAL at 07:42

## 2020-02-21 RX ADMIN — DEXTROSE AND SODIUM CHLORIDE PRN MLS/HR: 5; .9 INJECTION, SOLUTION INTRAVENOUS at 20:19

## 2020-02-21 RX ADMIN — VITAMIN D, TAB 1000IU (100/BT) SCH UNIT: 25 TAB at 07:43

## 2020-02-21 RX ADMIN — GABAPENTIN SCH MG: 100 CAPSULE ORAL at 05:41

## 2020-02-21 RX ADMIN — DRONABINOL SCH MG: 2.5 CAPSULE ORAL at 07:42

## 2020-02-21 RX ADMIN — ASPIRIN SCH MG: 81 TABLET, CHEWABLE ORAL at 07:42

## 2020-02-21 RX ADMIN — LACTOBACILLUS ACIDOPH-L.BULGARICUS 1 MILLION CELL CHEWABLE TABLET SCH TAB.CHEW: at 07:42

## 2020-02-21 RX ADMIN — CYANOCOBALAMIN TAB 1000 MCG SCH MCG: 1000 TAB at 07:43

## 2020-02-21 RX ADMIN — Medication PRN ML: at 05:41

## 2020-02-21 RX ADMIN — LEVETIRACETAM SCH MG: 500 TABLET, FILM COATED ORAL at 20:19

## 2020-02-21 RX ADMIN — VANCOMYCIN HYDROCHLORIDE SCH MG: 500 INJECTION, POWDER, LYOPHILIZED, FOR SOLUTION INTRAVENOUS at 07:44

## 2020-02-21 RX ADMIN — DRONABINOL SCH MG: 2.5 CAPSULE ORAL at 16:23

## 2020-02-21 RX ADMIN — GABAPENTIN SCH MG: 100 CAPSULE ORAL at 13:41

## 2020-02-21 RX ADMIN — LACTOBACILLUS ACIDOPH-L.BULGARICUS 1 MILLION CELL CHEWABLE TABLET SCH TAB.CHEW: at 16:23

## 2020-02-21 NOTE — NUR
PT ON CONT OBREGON VENT WITH 7.0 ET/TUBE IN PLACE WITH ANCHOR FAST IN PLACE, PT ON CPAP, PSV 
10, DOING WELL SPONT -[phone] APPROX, RR 18-23 DOING WELL, ALL VENT ALARMS OK, MAY WEAN 
OFF VENT ON DAY SHIFT.ISIS NASSAR RCP

-------------------------------------------------------------------------------

Addendum: 02/21/20 at 0034 by BLAKE NASSAR RT

-------------------------------------------------------------------------------

Amended: Links added.

## 2020-02-21 NOTE — NUR
rounds made patient in bed orally intubated and on cpap with ps of 10 fio2 30% ,tolerating 
vent setting saturation 99 % rr 17, patient doesn't follow commands no eyes opening only 
grimaces to painful stimuli  ,no spontaneous movement . suction via ett and via mouth ,off 
Levophed drip and off versed drip . f/c to bsd  with yellowish urine. continue to monitor 
v/s and levels of comfort .

## 2020-02-22 VITALS — DIASTOLIC BLOOD PRESSURE: 73 MMHG | SYSTOLIC BLOOD PRESSURE: 142 MMHG

## 2020-02-22 VITALS — DIASTOLIC BLOOD PRESSURE: 73 MMHG | SYSTOLIC BLOOD PRESSURE: 140 MMHG

## 2020-02-22 VITALS — SYSTOLIC BLOOD PRESSURE: 145 MMHG | DIASTOLIC BLOOD PRESSURE: 77 MMHG

## 2020-02-22 VITALS — DIASTOLIC BLOOD PRESSURE: 75 MMHG | SYSTOLIC BLOOD PRESSURE: 136 MMHG

## 2020-02-22 VITALS — DIASTOLIC BLOOD PRESSURE: 76 MMHG | SYSTOLIC BLOOD PRESSURE: 144 MMHG

## 2020-02-22 VITALS — SYSTOLIC BLOOD PRESSURE: 139 MMHG | DIASTOLIC BLOOD PRESSURE: 75 MMHG

## 2020-02-22 VITALS — DIASTOLIC BLOOD PRESSURE: 71 MMHG | SYSTOLIC BLOOD PRESSURE: 133 MMHG

## 2020-02-22 VITALS — SYSTOLIC BLOOD PRESSURE: 132 MMHG | DIASTOLIC BLOOD PRESSURE: 75 MMHG

## 2020-02-22 VITALS — DIASTOLIC BLOOD PRESSURE: 64 MMHG | SYSTOLIC BLOOD PRESSURE: 127 MMHG

## 2020-02-22 VITALS — SYSTOLIC BLOOD PRESSURE: 147 MMHG | DIASTOLIC BLOOD PRESSURE: 86 MMHG

## 2020-02-22 VITALS — DIASTOLIC BLOOD PRESSURE: 75 MMHG | SYSTOLIC BLOOD PRESSURE: 143 MMHG

## 2020-02-22 VITALS — DIASTOLIC BLOOD PRESSURE: 71 MMHG | SYSTOLIC BLOOD PRESSURE: 132 MMHG

## 2020-02-22 VITALS — DIASTOLIC BLOOD PRESSURE: 69 MMHG | SYSTOLIC BLOOD PRESSURE: 134 MMHG

## 2020-02-22 VITALS — DIASTOLIC BLOOD PRESSURE: 72 MMHG | SYSTOLIC BLOOD PRESSURE: 129 MMHG

## 2020-02-22 VITALS — DIASTOLIC BLOOD PRESSURE: 70 MMHG | SYSTOLIC BLOOD PRESSURE: 127 MMHG

## 2020-02-22 VITALS — DIASTOLIC BLOOD PRESSURE: 80 MMHG | SYSTOLIC BLOOD PRESSURE: 140 MMHG

## 2020-02-22 VITALS — DIASTOLIC BLOOD PRESSURE: 74 MMHG | SYSTOLIC BLOOD PRESSURE: 126 MMHG

## 2020-02-22 VITALS — SYSTOLIC BLOOD PRESSURE: 129 MMHG | DIASTOLIC BLOOD PRESSURE: 68 MMHG

## 2020-02-22 VITALS — DIASTOLIC BLOOD PRESSURE: 76 MMHG | SYSTOLIC BLOOD PRESSURE: 139 MMHG

## 2020-02-22 VITALS — DIASTOLIC BLOOD PRESSURE: 73 MMHG | SYSTOLIC BLOOD PRESSURE: 139 MMHG

## 2020-02-22 VITALS — DIASTOLIC BLOOD PRESSURE: 70 MMHG | SYSTOLIC BLOOD PRESSURE: 130 MMHG

## 2020-02-22 VITALS — SYSTOLIC BLOOD PRESSURE: 140 MMHG | DIASTOLIC BLOOD PRESSURE: 71 MMHG

## 2020-02-22 VITALS — SYSTOLIC BLOOD PRESSURE: 147 MMHG | DIASTOLIC BLOOD PRESSURE: 77 MMHG

## 2020-02-22 LAB
BASE EXCESS BLDA CALC-SCNC: -7 MMOL/L
BASOPHILS # BLD AUTO: 0 K/UL (ref 0–8)
BASOPHILS NFR BLD AUTO: 0.2 % (ref 0–2)
BIPAP ANDOR CPAP SETTING VENT: 10 CMH20
BUN SERPL-MCNC: 47 MG/DL (ref 7–18)
CHLORIDE SERPL-SCNC: 110 MMOL/L (ref 98–107)
CO2 SERPL-SCNC: 19 MMOL/L (ref 21–32)
CREAT SERPL-MCNC: 1.9 MG/DL (ref 0.6–1.3)
EOSINOPHIL # BLD AUTO: 0.1 K/UL (ref 0–0.7)
EOSINOPHIL NFR BLD AUTO: 0.6 % (ref 0–7)
GLUCOSE SERPL-MCNC: 95 MG/DL (ref 74–106)
HCO3 BLDA-SCNC: 17 MMOL/L
HCT VFR BLD AUTO: 25.7 % (ref 31.2–41.9)
HGB BLD-MCNC: 8.5 G/DL (ref 10.9–14.3)
HGB BLDA OXIMETRY-MCNC: 11.4 G/DL (ref 12–16)
INHALED O2 CONCENTRATION: 30 %
LYMPHOCYTES # BLD AUTO: 0.7 K/UL (ref 20–40)
LYMPHOCYTES NFR BLD AUTO: 8.1 % (ref 20.5–51.5)
MAGNESIUM SERPL-MCNC: 1.9 MG/DL (ref 1.8–2.4)
MCH RBC QN AUTO: 30.2 UUG (ref 24.7–32.8)
MCHC RBC AUTO-ENTMCNC: 33 G/DL (ref 32.3–35.6)
MCV RBC AUTO: 91.4 FL (ref 75.5–95.3)
MONOCYTES # BLD AUTO: 0.2 K/UL (ref 2–10)
MONOCYTES NFR BLD AUTO: 2.5 % (ref 0–11)
NEUTROPHILS # BLD AUTO: 7.9 K/UL (ref 1.8–8.9)
NEUTROPHILS NFR BLD AUTO: 88.6 % (ref 38.5–71.5)
PCO2 TEMP ADJ BLDA: 29.4 MMHG (ref 35–45)
PH TEMP ADJ BLDA: 7.38 [PH] (ref 7.35–7.45)
PHOSPHATE SERPL-MCNC: 4.4 MG/DL (ref 2.5–4.9)
PLATELET # BLD AUTO: 114 K/UL (ref 179–408)
PO2 TEMP ADJ BLDA: 95.9 MMHG (ref 75–100)
POTASSIUM SERPL-SCNC: 3.6 MMOL/L (ref 3.5–5.1)
RBC # BLD AUTO: 2.81 MIL/UL (ref 3.63–4.92)
SPECIMEN DRAWN FROM PATIENT: (no result)
VENTILATION MODE VENT: (no result)
WBC # BLD AUTO: 8.9 K/UL (ref 3.8–11.8)

## 2020-02-22 RX ADMIN — LEVETIRACETAM SCH MG: 500 TABLET, FILM COATED ORAL at 08:44

## 2020-02-22 RX ADMIN — VANCOMYCIN HYDROCHLORIDE SCH MG: 500 INJECTION, POWDER, LYOPHILIZED, FOR SOLUTION INTRAVENOUS at 02:29

## 2020-02-22 RX ADMIN — FAMOTIDINE SCH MG: 20 TABLET, FILM COATED ORAL at 08:43

## 2020-02-22 RX ADMIN — DEXTROSE SCH MLS/HR: 50 INJECTION, SOLUTION INTRAVENOUS at 20:27

## 2020-02-22 RX ADMIN — LEVETIRACETAM SCH MG: 500 TABLET, FILM COATED ORAL at 20:51

## 2020-02-22 RX ADMIN — FUROSEMIDE SCH MG: 10 INJECTION, SOLUTION INTRAMUSCULAR; INTRAVENOUS at 11:55

## 2020-02-22 RX ADMIN — GABAPENTIN SCH MG: 100 CAPSULE ORAL at 06:39

## 2020-02-22 RX ADMIN — LACTOBACILLUS ACIDOPH-L.BULGARICUS 1 MILLION CELL CHEWABLE TABLET SCH TAB.CHEW: at 08:43

## 2020-02-22 RX ADMIN — ASPIRIN SCH MG: 81 TABLET, CHEWABLE ORAL at 08:43

## 2020-02-22 RX ADMIN — ATORVASTATIN CALCIUM SCH MG: 20 TABLET, FILM COATED ORAL at 20:38

## 2020-02-22 RX ADMIN — GABAPENTIN SCH MG: 100 CAPSULE ORAL at 15:07

## 2020-02-22 RX ADMIN — VITAMIN D, TAB 1000IU (100/BT) SCH UNIT: 25 TAB at 08:44

## 2020-02-22 RX ADMIN — VANCOMYCIN HYDROCHLORIDE SCH MG: 500 INJECTION, POWDER, LYOPHILIZED, FOR SOLUTION INTRAVENOUS at 20:27

## 2020-02-22 RX ADMIN — SENNOSIDES SCH TAB: 8.6 TABLET, COATED ORAL at 20:38

## 2020-02-22 RX ADMIN — DRONABINOL SCH MG: 2.5 CAPSULE ORAL at 08:44

## 2020-02-22 RX ADMIN — GABAPENTIN SCH MG: 100 CAPSULE ORAL at 20:51

## 2020-02-22 RX ADMIN — FUROSEMIDE SCH MG: 10 INJECTION, SOLUTION INTRAMUSCULAR; INTRAVENOUS at 20:36

## 2020-02-22 RX ADMIN — CYANOCOBALAMIN TAB 1000 MCG SCH MCG: 1000 TAB at 08:44

## 2020-02-22 RX ADMIN — VANCOMYCIN HYDROCHLORIDE SCH MG: 500 INJECTION, POWDER, LYOPHILIZED, FOR SOLUTION INTRAVENOUS at 08:43

## 2020-02-22 RX ADMIN — VANCOMYCIN HYDROCHLORIDE SCH MG: 500 INJECTION, POWDER, LYOPHILIZED, FOR SOLUTION INTRAVENOUS at 15:07

## 2020-02-22 RX ADMIN — LACTOBACILLUS ACIDOPH-L.BULGARICUS 1 MILLION CELL CHEWABLE TABLET SCH TAB.CHEW: at 16:58

## 2020-02-22 RX ADMIN — FOLIC ACID SCH MG: 1 TABLET ORAL at 08:43

## 2020-02-22 NOTE — NUR
tolerating vent setting no respiratory distress noted .continue to monitor v/s . suction via 
ett and mouth .

## 2020-02-22 NOTE — NUR
Resident endorsed on AC mechanical ventilation with no signs of respiratory distress noted. 
Moderated amounts of blood seeping out of lips. Oral care rendered with caution to prevent 
further trauma. Nursing aware. Ettube secure with anchorfast, lip locations changed 
throughout shift. Alarms are on and audible. Will continue to monitor.

## 2020-02-22 NOTE — NUR
am care done ,changed soiled linens and gown .f/c done oral care done . turned and 
reposition . hob up .

## 2020-02-23 VITALS — DIASTOLIC BLOOD PRESSURE: 77 MMHG | SYSTOLIC BLOOD PRESSURE: 141 MMHG

## 2020-02-23 VITALS — DIASTOLIC BLOOD PRESSURE: 77 MMHG | SYSTOLIC BLOOD PRESSURE: 145 MMHG

## 2020-02-23 VITALS — SYSTOLIC BLOOD PRESSURE: 146 MMHG | DIASTOLIC BLOOD PRESSURE: 76 MMHG

## 2020-02-23 VITALS — SYSTOLIC BLOOD PRESSURE: 119 MMHG | DIASTOLIC BLOOD PRESSURE: 53 MMHG

## 2020-02-23 VITALS — DIASTOLIC BLOOD PRESSURE: 72 MMHG | SYSTOLIC BLOOD PRESSURE: 134 MMHG

## 2020-02-23 VITALS — SYSTOLIC BLOOD PRESSURE: 131 MMHG | DIASTOLIC BLOOD PRESSURE: 76 MMHG

## 2020-02-23 VITALS — SYSTOLIC BLOOD PRESSURE: 139 MMHG | DIASTOLIC BLOOD PRESSURE: 73 MMHG

## 2020-02-23 VITALS — DIASTOLIC BLOOD PRESSURE: 76 MMHG | SYSTOLIC BLOOD PRESSURE: 144 MMHG

## 2020-02-23 VITALS — DIASTOLIC BLOOD PRESSURE: 82 MMHG | SYSTOLIC BLOOD PRESSURE: 149 MMHG

## 2020-02-23 VITALS — SYSTOLIC BLOOD PRESSURE: 144 MMHG | DIASTOLIC BLOOD PRESSURE: 76 MMHG

## 2020-02-23 VITALS — DIASTOLIC BLOOD PRESSURE: 72 MMHG | SYSTOLIC BLOOD PRESSURE: 137 MMHG

## 2020-02-23 VITALS — DIASTOLIC BLOOD PRESSURE: 78 MMHG | SYSTOLIC BLOOD PRESSURE: 146 MMHG

## 2020-02-23 VITALS — DIASTOLIC BLOOD PRESSURE: 72 MMHG | SYSTOLIC BLOOD PRESSURE: 136 MMHG

## 2020-02-23 VITALS — SYSTOLIC BLOOD PRESSURE: 137 MMHG | DIASTOLIC BLOOD PRESSURE: 72 MMHG

## 2020-02-23 VITALS — SYSTOLIC BLOOD PRESSURE: 146 MMHG | DIASTOLIC BLOOD PRESSURE: 78 MMHG

## 2020-02-23 VITALS — DIASTOLIC BLOOD PRESSURE: 82 MMHG | SYSTOLIC BLOOD PRESSURE: 145 MMHG

## 2020-02-23 VITALS — SYSTOLIC BLOOD PRESSURE: 148 MMHG | DIASTOLIC BLOOD PRESSURE: 80 MMHG

## 2020-02-23 VITALS — SYSTOLIC BLOOD PRESSURE: 150 MMHG | DIASTOLIC BLOOD PRESSURE: 80 MMHG

## 2020-02-23 VITALS — SYSTOLIC BLOOD PRESSURE: 137 MMHG | DIASTOLIC BLOOD PRESSURE: 70 MMHG

## 2020-02-23 VITALS — SYSTOLIC BLOOD PRESSURE: 144 MMHG | DIASTOLIC BLOOD PRESSURE: 75 MMHG

## 2020-02-23 VITALS — SYSTOLIC BLOOD PRESSURE: 143 MMHG | DIASTOLIC BLOOD PRESSURE: 71 MMHG

## 2020-02-23 VITALS — DIASTOLIC BLOOD PRESSURE: 78 MMHG | SYSTOLIC BLOOD PRESSURE: 149 MMHG

## 2020-02-23 LAB
BASOPHILS # BLD AUTO: 0 K/UL (ref 0–8)
BASOPHILS NFR BLD AUTO: 0.2 % (ref 0–2)
BUN SERPL-MCNC: 49 MG/DL (ref 7–18)
CHLORIDE SERPL-SCNC: 108 MMOL/L (ref 98–107)
CO2 SERPL-SCNC: 21 MMOL/L (ref 21–32)
CREAT SERPL-MCNC: 2.1 MG/DL (ref 0.6–1.3)
EOSINOPHIL # BLD AUTO: 0 K/UL (ref 0–0.7)
EOSINOPHIL NFR BLD AUTO: 0.3 % (ref 0–7)
GLUCOSE SERPL-MCNC: 108 MG/DL (ref 74–106)
HCT VFR BLD AUTO: 24 % (ref 31.2–41.9)
HGB BLD-MCNC: 8 G/DL (ref 10.9–14.3)
LYMPHOCYTES # BLD AUTO: 0.7 K/UL (ref 20–40)
LYMPHOCYTES NFR BLD AUTO: 7.8 % (ref 20.5–51.5)
MAGNESIUM SERPL-MCNC: 1.9 MG/DL (ref 1.8–2.4)
MCH RBC QN AUTO: 30.1 UUG (ref 24.7–32.8)
MCHC RBC AUTO-ENTMCNC: 33 G/DL (ref 32.3–35.6)
MCV RBC AUTO: 90.3 FL (ref 75.5–95.3)
MONOCYTES # BLD AUTO: 0.3 K/UL (ref 2–10)
MONOCYTES NFR BLD AUTO: 3.9 % (ref 0–11)
NEUTROPHILS # BLD AUTO: 7.9 K/UL (ref 1.8–8.9)
NEUTROPHILS NFR BLD AUTO: 87.8 % (ref 38.5–71.5)
PHOSPHATE SERPL-MCNC: 4.3 MG/DL (ref 2.5–4.9)
PLATELET # BLD AUTO: 140 K/UL (ref 179–408)
POTASSIUM SERPL-SCNC: 3.4 MMOL/L (ref 3.5–5.1)
RBC # BLD AUTO: 2.66 MIL/UL (ref 3.63–4.92)
WBC # BLD AUTO: 9 K/UL (ref 3.8–11.8)

## 2020-02-23 RX ADMIN — DEXTROSE SCH MLS/HR: 50 INJECTION, SOLUTION INTRAVENOUS at 21:40

## 2020-02-23 RX ADMIN — VITAMIN D, TAB 1000IU (100/BT) SCH UNIT: 25 TAB at 09:27

## 2020-02-23 RX ADMIN — ATORVASTATIN CALCIUM SCH MG: 20 TABLET, FILM COATED ORAL at 21:41

## 2020-02-23 RX ADMIN — VANCOMYCIN HYDROCHLORIDE SCH MG: 500 INJECTION, POWDER, LYOPHILIZED, FOR SOLUTION INTRAVENOUS at 09:26

## 2020-02-23 RX ADMIN — Medication PRN ML: at 00:00

## 2020-02-23 RX ADMIN — VANCOMYCIN HYDROCHLORIDE SCH MG: 500 INJECTION, POWDER, LYOPHILIZED, FOR SOLUTION INTRAVENOUS at 21:42

## 2020-02-23 RX ADMIN — SODIUM CHLORIDE PRN MLS/HR: 0.9 INJECTION, SOLUTION INTRAVENOUS at 04:27

## 2020-02-23 RX ADMIN — SENNOSIDES SCH TAB: 8.6 TABLET, COATED ORAL at 21:00

## 2020-02-23 RX ADMIN — FAMOTIDINE SCH MG: 20 TABLET, FILM COATED ORAL at 09:27

## 2020-02-23 RX ADMIN — ASPIRIN SCH MG: 81 TABLET, CHEWABLE ORAL at 09:27

## 2020-02-23 RX ADMIN — VANCOMYCIN HYDROCHLORIDE SCH MG: 500 INJECTION, POWDER, LYOPHILIZED, FOR SOLUTION INTRAVENOUS at 02:24

## 2020-02-23 RX ADMIN — GABAPENTIN SCH MG: 100 CAPSULE ORAL at 05:21

## 2020-02-23 RX ADMIN — FOLIC ACID SCH MG: 1 TABLET ORAL at 09:27

## 2020-02-23 RX ADMIN — LACTOBACILLUS ACIDOPH-L.BULGARICUS 1 MILLION CELL CHEWABLE TABLET SCH TAB.CHEW: at 17:00

## 2020-02-23 RX ADMIN — LEVETIRACETAM SCH MG: 500 TABLET, FILM COATED ORAL at 09:27

## 2020-02-23 RX ADMIN — LACTOBACILLUS ACIDOPH-L.BULGARICUS 1 MILLION CELL CHEWABLE TABLET SCH TAB.CHEW: at 09:26

## 2020-02-23 RX ADMIN — CYANOCOBALAMIN TAB 1000 MCG SCH MCG: 1000 TAB at 09:27

## 2020-02-23 RX ADMIN — VANCOMYCIN HYDROCHLORIDE SCH MG: 500 INJECTION, POWDER, LYOPHILIZED, FOR SOLUTION INTRAVENOUS at 14:44

## 2020-02-23 NOTE — NUR
Report received from MISSY Haines.Pt remains unresponsive to any stimuli.No s/s of pain 
,discomfort noted.No Sob noted Oral care provided.Pt mouth and lips noted with dry blood.SR 
on monitor.Ngt intact. Tube feeding tolerated well.Rectal tube with liquid stool intact.Will 
continue to monitor.

## 2020-02-23 NOTE — NUR
received patient ,  obtunded ,  vent settings  ac 12 , 450  p 5 fio2 % 30 %, ngt with  
feeding of glucerna at 50ml , no residual , flexi seal intact and irrigated with 50ml , iv 
intact picc line right uooer arm , mouth has plenty of visible old blood  in and out of the 
mouth , cleanse  with moist dressing

## 2020-02-24 VITALS — DIASTOLIC BLOOD PRESSURE: 71 MMHG | SYSTOLIC BLOOD PRESSURE: 142 MMHG

## 2020-02-24 VITALS — SYSTOLIC BLOOD PRESSURE: 147 MMHG | DIASTOLIC BLOOD PRESSURE: 82 MMHG

## 2020-02-24 VITALS — SYSTOLIC BLOOD PRESSURE: 145 MMHG | DIASTOLIC BLOOD PRESSURE: 79 MMHG

## 2020-02-24 VITALS — SYSTOLIC BLOOD PRESSURE: 133 MMHG | DIASTOLIC BLOOD PRESSURE: 66 MMHG

## 2020-02-24 VITALS — SYSTOLIC BLOOD PRESSURE: 146 MMHG | DIASTOLIC BLOOD PRESSURE: 79 MMHG

## 2020-02-24 VITALS — DIASTOLIC BLOOD PRESSURE: 82 MMHG | SYSTOLIC BLOOD PRESSURE: 152 MMHG

## 2020-02-24 VITALS — SYSTOLIC BLOOD PRESSURE: 127 MMHG | DIASTOLIC BLOOD PRESSURE: 78 MMHG

## 2020-02-24 VITALS — SYSTOLIC BLOOD PRESSURE: 149 MMHG | DIASTOLIC BLOOD PRESSURE: 82 MMHG

## 2020-02-24 VITALS — SYSTOLIC BLOOD PRESSURE: 150 MMHG | DIASTOLIC BLOOD PRESSURE: 78 MMHG

## 2020-02-24 VITALS — DIASTOLIC BLOOD PRESSURE: 73 MMHG | SYSTOLIC BLOOD PRESSURE: 142 MMHG

## 2020-02-24 VITALS — DIASTOLIC BLOOD PRESSURE: 79 MMHG | SYSTOLIC BLOOD PRESSURE: 150 MMHG

## 2020-02-24 VITALS — SYSTOLIC BLOOD PRESSURE: 143 MMHG | DIASTOLIC BLOOD PRESSURE: 81 MMHG

## 2020-02-24 VITALS — SYSTOLIC BLOOD PRESSURE: 135 MMHG | DIASTOLIC BLOOD PRESSURE: 81 MMHG

## 2020-02-24 VITALS — SYSTOLIC BLOOD PRESSURE: 136 MMHG | DIASTOLIC BLOOD PRESSURE: 78 MMHG

## 2020-02-24 VITALS — DIASTOLIC BLOOD PRESSURE: 77 MMHG | SYSTOLIC BLOOD PRESSURE: 149 MMHG

## 2020-02-24 VITALS — SYSTOLIC BLOOD PRESSURE: 143 MMHG | DIASTOLIC BLOOD PRESSURE: 79 MMHG

## 2020-02-24 VITALS — DIASTOLIC BLOOD PRESSURE: 78 MMHG | SYSTOLIC BLOOD PRESSURE: 148 MMHG

## 2020-02-24 VITALS — DIASTOLIC BLOOD PRESSURE: 81 MMHG | SYSTOLIC BLOOD PRESSURE: 154 MMHG

## 2020-02-24 VITALS — DIASTOLIC BLOOD PRESSURE: 77 MMHG | SYSTOLIC BLOOD PRESSURE: 146 MMHG

## 2020-02-24 VITALS — DIASTOLIC BLOOD PRESSURE: 79 MMHG | SYSTOLIC BLOOD PRESSURE: 143 MMHG

## 2020-02-24 VITALS — DIASTOLIC BLOOD PRESSURE: 78 MMHG | SYSTOLIC BLOOD PRESSURE: 151 MMHG

## 2020-02-24 VITALS — SYSTOLIC BLOOD PRESSURE: 138 MMHG | DIASTOLIC BLOOD PRESSURE: 82 MMHG

## 2020-02-24 VITALS — DIASTOLIC BLOOD PRESSURE: 78 MMHG | SYSTOLIC BLOOD PRESSURE: 150 MMHG

## 2020-02-24 VITALS — DIASTOLIC BLOOD PRESSURE: 79 MMHG | SYSTOLIC BLOOD PRESSURE: 148 MMHG

## 2020-02-24 LAB
BASE EXCESS BLDA CALC-SCNC: -2.2 MMOL/L
BASOPHILS # BLD AUTO: 0 K/UL (ref 0–8)
BASOPHILS NFR BLD AUTO: 0.1 % (ref 0–2)
BIPAP ANDOR CPAP SETTING VENT: 5 CMH20
BUN SERPL-MCNC: 52 MG/DL (ref 7–18)
CHLORIDE SERPL-SCNC: 110 MMOL/L (ref 98–107)
CO2 SERPL-SCNC: 24 MMOL/L (ref 21–32)
CREAT SERPL-MCNC: 2 MG/DL (ref 0.6–1.3)
EOSINOPHIL # BLD AUTO: 0 K/UL (ref 0–0.7)
EOSINOPHIL NFR BLD AUTO: 0.2 % (ref 0–7)
GLUCOSE SERPL-MCNC: 113 MG/DL (ref 74–106)
HCO3 BLDA-SCNC: 21.5 MMOL/L
HCT VFR BLD AUTO: 23.9 % (ref 31.2–41.9)
HGB BLD-MCNC: 7.9 G/DL (ref 10.9–14.3)
HGB BLDA OXIMETRY-MCNC: 7.6 G/DL (ref 12–16)
INHALED O2 CONCENTRATION: 30 %
LYMPHOCYTES # BLD AUTO: 0.7 K/UL (ref 20–40)
LYMPHOCYTES NFR BLD AUTO: 5.5 % (ref 20.5–51.5)
MAGNESIUM SERPL-MCNC: 1.8 MG/DL (ref 1.8–2.4)
MCH RBC QN AUTO: 30 UUG (ref 24.7–32.8)
MCHC RBC AUTO-ENTMCNC: 33 G/DL (ref 32.3–35.6)
MCV RBC AUTO: 90.8 FL (ref 75.5–95.3)
MONOCYTES # BLD AUTO: 0.4 K/UL (ref 2–10)
MONOCYTES NFR BLD AUTO: 3.4 % (ref 0–11)
NEUTROPHILS # BLD AUTO: 11.6 K/UL (ref 1.8–8.9)
NEUTROPHILS NFR BLD AUTO: 90.8 % (ref 38.5–71.5)
PCO2 TEMP ADJ BLDA: 32 MMHG (ref 35–45)
PEEP SETTING VENT: 404 ML
PH TEMP ADJ BLDA: 7.45 [PH] (ref 7.35–7.45)
PHOSPHATE SERPL-MCNC: 4.3 MG/DL (ref 2.5–4.9)
PLATELET # BLD AUTO: 188 K/UL (ref 179–408)
PO2 TEMP ADJ BLDA: 80.2 MMHG (ref 75–100)
POTASSIUM SERPL-SCNC: 4.2 MMOL/L (ref 3.5–5.1)
RBC # BLD AUTO: 2.63 MIL/UL (ref 3.63–4.92)
SET RATE, BG: 25
SPECIMEN DRAWN FROM PATIENT: (no result)
VENTILATION MODE VENT: (no result)
WBC # BLD AUTO: 12.8 K/UL (ref 3.8–11.8)

## 2020-02-24 RX ADMIN — VANCOMYCIN HYDROCHLORIDE SCH MG: 500 INJECTION, POWDER, LYOPHILIZED, FOR SOLUTION INTRAVENOUS at 02:39

## 2020-02-24 RX ADMIN — LEVETIRACETAM SCH MG: 500 TABLET, FILM COATED ORAL at 21:12

## 2020-02-24 RX ADMIN — GABAPENTIN SCH MG: 100 CAPSULE ORAL at 09:35

## 2020-02-24 RX ADMIN — SODIUM CHLORIDE PRN MLS/HR: 0.9 INJECTION, SOLUTION INTRAVENOUS at 15:45

## 2020-02-24 RX ADMIN — LACTOBACILLUS ACIDOPH-L.BULGARICUS 1 MILLION CELL CHEWABLE TABLET SCH TAB.CHEW: at 09:35

## 2020-02-24 RX ADMIN — ASPIRIN SCH MG: 81 TABLET, CHEWABLE ORAL at 09:35

## 2020-02-24 RX ADMIN — FOLIC ACID SCH MG: 1 TABLET ORAL at 09:35

## 2020-02-24 RX ADMIN — VANCOMYCIN HYDROCHLORIDE SCH MG: 500 INJECTION, POWDER, LYOPHILIZED, FOR SOLUTION INTRAVENOUS at 14:38

## 2020-02-24 RX ADMIN — LEVETIRACETAM SCH MG: 500 TABLET, FILM COATED ORAL at 09:39

## 2020-02-24 RX ADMIN — ACETAMINOPHEN PRN MG: 160 SOLUTION ORAL at 11:01

## 2020-02-24 RX ADMIN — DEXTROSE SCH MLS/HR: 50 INJECTION, SOLUTION INTRAVENOUS at 21:02

## 2020-02-24 RX ADMIN — SENNOSIDES SCH TAB: 8.6 TABLET, COATED ORAL at 21:00

## 2020-02-24 RX ADMIN — LACTOBACILLUS ACIDOPH-L.BULGARICUS 1 MILLION CELL CHEWABLE TABLET SCH TAB.CHEW: at 17:29

## 2020-02-24 RX ADMIN — VALACYCLOVIR HYDROCHLORIDE SCH MG: 500 TABLET, FILM COATED ORAL at 17:29

## 2020-02-24 RX ADMIN — ATORVASTATIN CALCIUM SCH MG: 20 TABLET, FILM COATED ORAL at 21:12

## 2020-02-24 RX ADMIN — VANCOMYCIN HYDROCHLORIDE SCH MG: 500 INJECTION, POWDER, LYOPHILIZED, FOR SOLUTION INTRAVENOUS at 21:12

## 2020-02-24 RX ADMIN — CYANOCOBALAMIN TAB 1000 MCG SCH MCG: 1000 TAB at 09:37

## 2020-02-24 RX ADMIN — VITAMIN D, TAB 1000IU (100/BT) SCH UNIT: 25 TAB at 09:37

## 2020-02-24 RX ADMIN — FAMOTIDINE SCH MG: 20 TABLET, FILM COATED ORAL at 09:36

## 2020-02-24 RX ADMIN — VANCOMYCIN HYDROCHLORIDE SCH MG: 500 INJECTION, POWDER, LYOPHILIZED, FOR SOLUTION INTRAVENOUS at 08:01

## 2020-02-24 NOTE — NUR
Pt remains orally intubated on vent support, weaning since this am on CPAP/PS, tolerating 
fairly well, no distress at this time noted. sxn'd orally and ETT blood tinged secretions 
noted. pt trans to CT and back to  without incident. vent alarms audible, reset. bvm at 
bedside.

## 2020-02-24 NOTE — NUR
aintains body temperature below 39 C

   degrees

* Maintains respiratory rate and heart

   rate WNL

* Maintains optimal lab values

-------------------------------------------------------------------------------

Addendum: 02/24/20 at 0052 by ONI SCHMITT RN

-------------------------------------------------------------------------------

Amended: Links added.

## 2020-02-24 NOTE — NUR
* Understands anatomy and physiology

* Describes reportable s/s

* Understands treatment plan*

Identifies current stressors

* Develops effective coping behaviors

-------------------------------------------------------------------------------

Addendum: 02/24/20 at 2210 by ONI SCHMITT RN

-------------------------------------------------------------------------------

Amended: Links added.

## 2020-02-24 NOTE — NUR
* Exhibits granulation/healing at site

* Exhibits decreased drainage at site

* Exhibits no s/s of infection

* Exhibits a  decrease in lesion size

* Maintains nutritional status

-------------------------------------------------------------------------------

Addendum: 02/24/20 at 0052 by ONI SCHMITT RN

-------------------------------------------------------------------------------

Amended: Links added.

## 2020-02-24 NOTE — NUR
MOUTH  IS VERY DRY LIPS ARE   BLACK COLOR WITH OLD BLOOD AND FRESH NEW BLOOD SCANT DRAINAGE 
,IN THE MOUTH

## 2020-02-24 NOTE — NUR
* Maintains blood gasses WNL

* Evidences usual mental status

* Exhibits usual skin color

-------------------------------------------------------------------------------

Addendum: 02/24/20 at 0052 by ONI SCHMITT RN

-------------------------------------------------------------------------------

Amended: Links added.

## 2020-02-24 NOTE — NUR
* Exhibits no s/s of infection

* Exhibits a  decrease in lesion size

* Maintains nutritional status

* Maintains hydration status

* Maintains optimal lab values

-------------------------------------------------------------------------------

Addendum: 02/24/20 at 0052 by ONI SCHMITT RN

-------------------------------------------------------------------------------

Amended: Links added.

## 2020-02-24 NOTE — NUR
* Maintains vital signs WNL

* Evidences no purulent drainage from

   wounds, incisions, and tubes

* Maintains optimal lab values

PATIENT IS ON ANTIBIOTIC

-------------------------------------------------------------------------------

Addendum: 02/24/20 at 2209 by ONI SCHMITT RN

-------------------------------------------------------------------------------

Amended: Links added.

## 2020-02-24 NOTE — NUR
* Maintains vital signs WNL

* Maintains urine output > 30 ml/hr

* Evidences normal skin turgor

* Maintains optimal lab values

* Maintains urine specific gravity WNL

-------------------------------------------------------------------------------

Addendum: 02/24/20 at 0052 by ONI SCHMITT RN

-------------------------------------------------------------------------------

Amended: Links added.

## 2020-02-24 NOTE — NUR
* Exhibits granulation/healing at site

* Exhibits decreased drainage at site

* Exhibits no s/s of infection

* Exhibits a  decrease in lesion size

* Maintains nutritional status

-------------------------------------------------------------------------------

Addendum: 02/24/20 at 2209 by ONI SCHMITT RN

-------------------------------------------------------------------------------

Amended: Links added.

## 2020-02-24 NOTE — NUR
PT IS PLACED ON CPAP, PSV-8. TOLERATED WELL. PT STILL NOT AWAKE. DR RODRÍGUEZ ORDERED CT SCAN 
OF THE HEAD. MOUTH IS BLEEDING WITH LESIONS ON THE LIPS NOTED. DR LEAL IS AWARE.

## 2020-02-24 NOTE — NUR
* Identifies current stressors

* Develops effective coping behaviors

* Uses support services as appropriate

-------------------------------------------------------------------------------

Addendum: 02/24/20 at 0052 by ONI SCHMITT RN

-------------------------------------------------------------------------------

Amended: Links added.

## 2020-02-24 NOTE — NUR
* Describes reportable s/s

* Understands treatment plan* Identifies current stressors

* Develops effective coping behaviors

* Uses support services as appropriate

-------------------------------------------------------------------------------

Addendum: 02/24/20 at 0052 by ONI SCHMITT RN

-------------------------------------------------------------------------------

Amended: Links added.

## 2020-02-24 NOTE — NUR
* Maintains vital signs WNL

* Maintains urine output > 30 ml/hr

* Evidences normal skin turgor

* Maintains optimal lab values

* Maintains urine specific gravity WN

-------------------------------------------------------------------------------

Addendum: 02/24/20 at 2209 by ONI SCHMITT RN

-------------------------------------------------------------------------------

Amended: Links added.

## 2020-02-24 NOTE — NUR
* Discusses measures to prevent

   aspiration

* Maintains normal breath sounds

* Maintains normal WBC

* Maintains vital signs WNL

-------------------------------------------------------------------------------

Addendum: 02/24/20 at 2209 by ONI SCHMITT RN

-------------------------------------------------------------------------------

Amended: Links added.

## 2020-02-24 NOTE — NUR
PATIENT   IS NON REPSONSIVE , VENT SETTINGS ` AC 12 TV  450  P5 FIO2 30 % , NGT GLECERNA 50 
ML  RODRIGUES  PICC LINE RIGHT UPPER ARM  , FLEXI SEAL INTACT , NO FEVER

## 2020-02-24 NOTE — NUR
* Maintains vital signs WNL

* Evidences no purulent drainage from

   wounds, incisions, and tubes

* Maintains optimal lab values

-------------------------------------------------------------------------------

Addendum: 02/24/20 at 0052 by ONI SCHMITT RN

-------------------------------------------------------------------------------

Amended: Links added.

## 2020-02-24 NOTE — NUR
* Maintains blood gasses WNL

* Evidences usual mental status

* Exhibits usual skin color

-------------------------------------------------------------------------------

Addendum: 02/24/20 at 2209 by ONI SCHMITT RN

-------------------------------------------------------------------------------

Amended: Links added.

## 2020-02-24 NOTE — NUR
* Understands anatomy and physiology

* Describes reportable s/s

* Understands treatment plan*

Identifies current stressors

* Develops effective coping behaviors

-------------------------------------------------------------------------------

Addendum: 02/24/20 at 2209 by ONI SCHMITT RN

-------------------------------------------------------------------------------

Amended: Links added.

## 2020-02-25 VITALS — DIASTOLIC BLOOD PRESSURE: 76 MMHG | SYSTOLIC BLOOD PRESSURE: 150 MMHG

## 2020-02-25 VITALS — SYSTOLIC BLOOD PRESSURE: 143 MMHG | DIASTOLIC BLOOD PRESSURE: 78 MMHG

## 2020-02-25 VITALS — SYSTOLIC BLOOD PRESSURE: 132 MMHG | DIASTOLIC BLOOD PRESSURE: 71 MMHG

## 2020-02-25 VITALS — SYSTOLIC BLOOD PRESSURE: 163 MMHG | DIASTOLIC BLOOD PRESSURE: 89 MMHG

## 2020-02-25 VITALS — DIASTOLIC BLOOD PRESSURE: 67 MMHG | SYSTOLIC BLOOD PRESSURE: 131 MMHG

## 2020-02-25 VITALS — SYSTOLIC BLOOD PRESSURE: 133 MMHG | DIASTOLIC BLOOD PRESSURE: 71 MMHG

## 2020-02-25 VITALS — DIASTOLIC BLOOD PRESSURE: 85 MMHG | SYSTOLIC BLOOD PRESSURE: 157 MMHG

## 2020-02-25 VITALS — SYSTOLIC BLOOD PRESSURE: 135 MMHG | DIASTOLIC BLOOD PRESSURE: 68 MMHG

## 2020-02-25 VITALS — DIASTOLIC BLOOD PRESSURE: 87 MMHG | SYSTOLIC BLOOD PRESSURE: 150 MMHG

## 2020-02-25 VITALS — DIASTOLIC BLOOD PRESSURE: 89 MMHG | SYSTOLIC BLOOD PRESSURE: 162 MMHG

## 2020-02-25 VITALS — SYSTOLIC BLOOD PRESSURE: 142 MMHG | DIASTOLIC BLOOD PRESSURE: 72 MMHG

## 2020-02-25 VITALS — SYSTOLIC BLOOD PRESSURE: 142 MMHG | DIASTOLIC BLOOD PRESSURE: 80 MMHG

## 2020-02-25 VITALS — DIASTOLIC BLOOD PRESSURE: 77 MMHG | SYSTOLIC BLOOD PRESSURE: 157 MMHG

## 2020-02-25 VITALS — SYSTOLIC BLOOD PRESSURE: 140 MMHG | DIASTOLIC BLOOD PRESSURE: 74 MMHG

## 2020-02-25 VITALS — DIASTOLIC BLOOD PRESSURE: 75 MMHG | SYSTOLIC BLOOD PRESSURE: 145 MMHG

## 2020-02-25 VITALS — SYSTOLIC BLOOD PRESSURE: 149 MMHG | DIASTOLIC BLOOD PRESSURE: 80 MMHG

## 2020-02-25 VITALS — DIASTOLIC BLOOD PRESSURE: 79 MMHG | SYSTOLIC BLOOD PRESSURE: 148 MMHG

## 2020-02-25 VITALS — SYSTOLIC BLOOD PRESSURE: 138 MMHG | DIASTOLIC BLOOD PRESSURE: 72 MMHG

## 2020-02-25 VITALS — DIASTOLIC BLOOD PRESSURE: 68 MMHG | SYSTOLIC BLOOD PRESSURE: 135 MMHG

## 2020-02-25 VITALS — SYSTOLIC BLOOD PRESSURE: 155 MMHG | DIASTOLIC BLOOD PRESSURE: 78 MMHG

## 2020-02-25 VITALS — SYSTOLIC BLOOD PRESSURE: 142 MMHG | DIASTOLIC BLOOD PRESSURE: 76 MMHG

## 2020-02-25 VITALS — DIASTOLIC BLOOD PRESSURE: 100 MMHG | SYSTOLIC BLOOD PRESSURE: 179 MMHG

## 2020-02-25 VITALS — DIASTOLIC BLOOD PRESSURE: 77 MMHG | SYSTOLIC BLOOD PRESSURE: 155 MMHG

## 2020-02-25 VITALS — DIASTOLIC BLOOD PRESSURE: 84 MMHG | SYSTOLIC BLOOD PRESSURE: 150 MMHG

## 2020-02-25 LAB
ALP SERPL-CCNC: 78 U/L (ref 50–136)
ALT SERPL W/O P-5'-P-CCNC: 25 U/L (ref 14–59)
AST SERPL-CCNC: 50 U/L (ref 15–37)
BASE EXCESS BLDA CALC-SCNC: -2 MMOL/L
BASOPHILS # BLD AUTO: 0 K/UL (ref 0–8)
BASOPHILS NFR BLD AUTO: 0.2 % (ref 0–2)
BILIRUB DIRECT SERPL-MCNC: 0.1 MG/DL (ref 0–0.2)
BILIRUB SERPL-MCNC: 0.2 MG/DL (ref 0.2–1)
BIPAP ANDOR CPAP SETTING VENT: 12 CMH20
BUN SERPL-MCNC: 55 MG/DL (ref 7–18)
CHLORIDE SERPL-SCNC: 110 MMOL/L (ref 98–107)
CO2 SERPL-SCNC: 24 MMOL/L (ref 21–32)
CREAT SERPL-MCNC: 1.9 MG/DL (ref 0.6–1.3)
EOSINOPHIL # BLD AUTO: 0.1 K/UL (ref 0–0.7)
EOSINOPHIL NFR BLD AUTO: 0.4 % (ref 0–7)
GLUCOSE SERPL-MCNC: 116 MG/DL (ref 74–106)
HCO3 BLDA-SCNC: 22 MMOL/L
HCT VFR BLD AUTO: 20.9 % (ref 31.2–41.9)
HGB BLD-MCNC: 6.9 G/DL (ref 10.9–14.3)
HGB BLDA OXIMETRY-MCNC: 7.9 G/DL (ref 12–16)
INHALED O2 CONCENTRATION: 30 %
INTRINSIC PEEP RESPIRATORY: 5 CMH20
LYMPHOCYTES # BLD AUTO: 0.9 K/UL (ref 20–40)
LYMPHOCYTES NFR BLD AUTO: 6 % (ref 20.5–51.5)
LYMPHOCYTES NFR BLD MANUAL: 11 % (ref 20–40)
MAGNESIUM SERPL-MCNC: 1.9 MG/DL (ref 1.8–2.4)
MCH RBC QN AUTO: 29.8 UUG (ref 24.7–32.8)
MCHC RBC AUTO-ENTMCNC: 33 G/DL (ref 32.3–35.6)
MCV RBC AUTO: 89.9 FL (ref 75.5–95.3)
MONOCYTES # BLD AUTO: 0.5 K/UL (ref 2–10)
MONOCYTES NFR BLD AUTO: 3.5 % (ref 0–11)
MONOCYTES NFR BLD MANUAL: 5 % (ref 2–10)
NEUTROPHILS # BLD AUTO: 13.1 K/UL (ref 1.8–8.9)
NEUTROPHILS NFR BLD AUTO: 89.9 % (ref 38.5–71.5)
NEUTS SEG NFR BLD MANUAL: 84 % (ref 42–75)
PCO2 TEMP ADJ BLDA: 33.9 MMHG (ref 35–45)
PH TEMP ADJ BLDA: 7.43 [PH] (ref 7.35–7.45)
PHOSPHATE SERPL-MCNC: 4.8 MG/DL (ref 2.5–4.9)
PLATELET # BLD AUTO: 211 K/UL (ref 179–408)
PO2 TEMP ADJ BLDA: 87 MMHG (ref 75–100)
POTASSIUM SERPL-SCNC: 4.3 MMOL/L (ref 3.5–5.1)
RBC # BLD AUTO: 2.32 MIL/UL (ref 3.63–4.92)
SPECIMEN DRAWN FROM PATIENT: (no result)
VENTILATION MODE VENT: (no result)
WBC # BLD AUTO: 14.6 K/UL (ref 3.8–11.8)
WS STN SPEC: 6.7 G/DL (ref 6.4–8.2)

## 2020-02-25 RX ADMIN — ACETAMINOPHEN PRN MG: 160 SOLUTION ORAL at 00:40

## 2020-02-25 RX ADMIN — SENNOSIDES SCH TAB: 8.6 TABLET, COATED ORAL at 21:23

## 2020-02-25 RX ADMIN — VANCOMYCIN HYDROCHLORIDE SCH MG: 500 INJECTION, POWDER, LYOPHILIZED, FOR SOLUTION INTRAVENOUS at 01:56

## 2020-02-25 RX ADMIN — ASPIRIN SCH MG: 81 TABLET, CHEWABLE ORAL at 08:31

## 2020-02-25 RX ADMIN — ACETAMINOPHEN PRN MG: 160 SOLUTION ORAL at 09:56

## 2020-02-25 RX ADMIN — SODIUM CHLORIDE PRN MLS/HR: 0.9 INJECTION, SOLUTION INTRAVENOUS at 18:02

## 2020-02-25 RX ADMIN — VALACYCLOVIR HYDROCHLORIDE SCH MG: 500 TABLET, FILM COATED ORAL at 08:32

## 2020-02-25 RX ADMIN — VANCOMYCIN HYDROCHLORIDE SCH MG: 500 INJECTION, POWDER, LYOPHILIZED, FOR SOLUTION INTRAVENOUS at 21:23

## 2020-02-25 RX ADMIN — LACTOBACILLUS ACIDOPH-L.BULGARICUS 1 MILLION CELL CHEWABLE TABLET SCH TAB.CHEW: at 17:49

## 2020-02-25 RX ADMIN — LACTOBACILLUS ACIDOPH-L.BULGARICUS 1 MILLION CELL CHEWABLE TABLET SCH TAB.CHEW: at 08:31

## 2020-02-25 RX ADMIN — CYANOCOBALAMIN TAB 1000 MCG SCH MCG: 1000 TAB at 08:33

## 2020-02-25 RX ADMIN — VANCOMYCIN HYDROCHLORIDE SCH MG: 500 INJECTION, POWDER, LYOPHILIZED, FOR SOLUTION INTRAVENOUS at 14:37

## 2020-02-25 RX ADMIN — LEVETIRACETAM SCH MG: 500 TABLET, FILM COATED ORAL at 08:31

## 2020-02-25 RX ADMIN — FOLIC ACID SCH MG: 1 TABLET ORAL at 08:31

## 2020-02-25 RX ADMIN — FAMOTIDINE SCH MG: 20 TABLET, FILM COATED ORAL at 08:31

## 2020-02-25 RX ADMIN — FUROSEMIDE SCH MG: 10 INJECTION, SOLUTION INTRAMUSCULAR; INTRAVENOUS at 15:01

## 2020-02-25 RX ADMIN — LEVETIRACETAM SCH MG: 500 TABLET, FILM COATED ORAL at 21:23

## 2020-02-25 RX ADMIN — VITAMIN D, TAB 1000IU (100/BT) SCH UNIT: 25 TAB at 08:32

## 2020-02-25 RX ADMIN — GABAPENTIN SCH MG: 100 CAPSULE ORAL at 08:32

## 2020-02-25 RX ADMIN — Medication PRN MG: at 10:35

## 2020-02-25 RX ADMIN — ATORVASTATIN CALCIUM SCH MG: 20 TABLET, FILM COATED ORAL at 21:23

## 2020-02-25 RX ADMIN — VANCOMYCIN HYDROCHLORIDE SCH MG: 500 INJECTION, POWDER, LYOPHILIZED, FOR SOLUTION INTRAVENOUS at 08:12

## 2020-02-25 RX ADMIN — DEXTROSE SCH MLS/HR: 50 INJECTION, SOLUTION INTRAVENOUS at 21:19

## 2020-02-25 NOTE — NUR
Received pt orally intubated with 7.0 ETT~22cm at lip line, on Cook vent with the following 
settings of AC-12, Vt-450, PEEP+5, FIO2-30%. No s/s of respiratory distress noted. Airway 
care done, pt responded to physical stimuli. RN aware of pt's bloody mouth. Resus. bag at 
bedside. Vent and alarms checked and reset.

## 2020-02-25 NOTE — NUR
Exhibits granulation/healing at site

* Exhibits decreased drainage at site

* Exhibits no s/s of infection

* Exhibits a  decrease in lesion size

* Maintains nutritional status

-------------------------------------------------------------------------------

Addendum: 02/25/20 at 0312 by ONI SCHMITT RN

-------------------------------------------------------------------------------

Amended: Links added.

## 2020-02-25 NOTE — NUR
NOTIFIED DR RODRÍGUEZ THAT MRI CANNOT DO THE PROCEEDURE  WITH INTUBATED PT. NO FURTHER ORDERS 
MADE.

## 2020-02-25 NOTE — NUR
PER MD ORDER PT PLACED ON CPAP MODE. PRESSURE SUPPORT OF 12, PEEP +5, 30% FIO2

RN AWARE. SEDATION OFF. PATIENT DELILAH WEANING WELL. WILL CONTINUE TO MONITOR.

## 2020-02-25 NOTE — NUR
patient is still unresponsive ,  picc line , flexi seal and kapoor intact , mouth and lips 
bleeding  with thick secretions of red blood from mouth , suctioned , and cleansed   gently

## 2020-02-25 NOTE — NUR
1 UNIT OF PRBC STARTED INFUSING AS ORDERED. H&H IS 6.9/20.8  PT IS BLEEDING AROUND HER MOUTH 
AND ORAL CARE DONE. MD IS AWARE.

## 2020-02-25 NOTE — NUR
patient is non responsive , picc line , flexi seal , kapoor intact , vent settings  a 12 , tv 
450 , p5 , fio2 30 % ,   mouth and lips with old blood , cleansed    with  moist dressing . 
gt  feeding , glu christina  50 ml .

## 2020-02-26 VITALS — DIASTOLIC BLOOD PRESSURE: 84 MMHG | SYSTOLIC BLOOD PRESSURE: 156 MMHG

## 2020-02-26 VITALS — DIASTOLIC BLOOD PRESSURE: 85 MMHG | SYSTOLIC BLOOD PRESSURE: 145 MMHG

## 2020-02-26 VITALS — DIASTOLIC BLOOD PRESSURE: 71 MMHG | SYSTOLIC BLOOD PRESSURE: 145 MMHG

## 2020-02-26 VITALS — DIASTOLIC BLOOD PRESSURE: 80 MMHG | SYSTOLIC BLOOD PRESSURE: 137 MMHG

## 2020-02-26 VITALS — SYSTOLIC BLOOD PRESSURE: 150 MMHG | DIASTOLIC BLOOD PRESSURE: 84 MMHG

## 2020-02-26 VITALS — SYSTOLIC BLOOD PRESSURE: 171 MMHG | DIASTOLIC BLOOD PRESSURE: 103 MMHG

## 2020-02-26 VITALS — DIASTOLIC BLOOD PRESSURE: 83 MMHG | SYSTOLIC BLOOD PRESSURE: 137 MMHG

## 2020-02-26 VITALS — DIASTOLIC BLOOD PRESSURE: 98 MMHG | SYSTOLIC BLOOD PRESSURE: 162 MMHG

## 2020-02-26 VITALS — SYSTOLIC BLOOD PRESSURE: 147 MMHG | DIASTOLIC BLOOD PRESSURE: 74 MMHG

## 2020-02-26 VITALS — SYSTOLIC BLOOD PRESSURE: 137 MMHG | DIASTOLIC BLOOD PRESSURE: 69 MMHG

## 2020-02-26 VITALS — SYSTOLIC BLOOD PRESSURE: 169 MMHG | DIASTOLIC BLOOD PRESSURE: 95 MMHG

## 2020-02-26 VITALS — DIASTOLIC BLOOD PRESSURE: 85 MMHG | SYSTOLIC BLOOD PRESSURE: 150 MMHG

## 2020-02-26 VITALS — DIASTOLIC BLOOD PRESSURE: 78 MMHG | SYSTOLIC BLOOD PRESSURE: 155 MMHG

## 2020-02-26 VITALS — SYSTOLIC BLOOD PRESSURE: 136 MMHG | DIASTOLIC BLOOD PRESSURE: 80 MMHG

## 2020-02-26 VITALS — SYSTOLIC BLOOD PRESSURE: 160 MMHG | DIASTOLIC BLOOD PRESSURE: 91 MMHG

## 2020-02-26 VITALS — DIASTOLIC BLOOD PRESSURE: 89 MMHG | SYSTOLIC BLOOD PRESSURE: 148 MMHG

## 2020-02-26 VITALS — DIASTOLIC BLOOD PRESSURE: 85 MMHG | SYSTOLIC BLOOD PRESSURE: 156 MMHG

## 2020-02-26 VITALS — DIASTOLIC BLOOD PRESSURE: 67 MMHG | SYSTOLIC BLOOD PRESSURE: 140 MMHG

## 2020-02-26 VITALS — SYSTOLIC BLOOD PRESSURE: 146 MMHG | DIASTOLIC BLOOD PRESSURE: 83 MMHG

## 2020-02-26 VITALS — DIASTOLIC BLOOD PRESSURE: 80 MMHG | SYSTOLIC BLOOD PRESSURE: 136 MMHG

## 2020-02-26 VITALS — SYSTOLIC BLOOD PRESSURE: 131 MMHG | DIASTOLIC BLOOD PRESSURE: 73 MMHG

## 2020-02-26 VITALS — SYSTOLIC BLOOD PRESSURE: 157 MMHG | DIASTOLIC BLOOD PRESSURE: 87 MMHG

## 2020-02-26 VITALS — DIASTOLIC BLOOD PRESSURE: 85 MMHG | SYSTOLIC BLOOD PRESSURE: 144 MMHG

## 2020-02-26 VITALS — DIASTOLIC BLOOD PRESSURE: 81 MMHG | SYSTOLIC BLOOD PRESSURE: 147 MMHG

## 2020-02-26 LAB
BASE EXCESS BLDA CALC-SCNC: 1.1 MMOL/L
BASOPHILS # BLD AUTO: 0 K/UL (ref 0–8)
BASOPHILS NFR BLD AUTO: 0.2 % (ref 0–2)
BUN SERPL-MCNC: 57 MG/DL (ref 7–18)
CHLORIDE SERPL-SCNC: 107 MMOL/L (ref 98–107)
CO2 SERPL-SCNC: 26 MMOL/L (ref 21–32)
CREAT SERPL-MCNC: 1.8 MG/DL (ref 0.6–1.3)
EOSINOPHIL # BLD AUTO: 0 K/UL (ref 0–0.7)
EOSINOPHIL NFR BLD AUTO: 0.2 % (ref 0–7)
GLUCOSE SERPL-MCNC: 109 MG/DL (ref 74–106)
HCO3 BLDA-SCNC: 25.1 MMOL/L
HCT VFR BLD AUTO: 25.6 % (ref 31.2–41.9)
HGB BLD-MCNC: 8.5 G/DL (ref 10.9–14.3)
HGB BLDA OXIMETRY-MCNC: 9.4 G/DL (ref 12–16)
INHALED O2 CONCENTRATION: 30 %
INTRINSIC PEEP RESPIRATORY: 5 CMH20
LYMPHOCYTES # BLD AUTO: 1.1 K/UL (ref 20–40)
LYMPHOCYTES NFR BLD AUTO: 6.9 % (ref 20.5–51.5)
MAGNESIUM SERPL-MCNC: 2 MG/DL (ref 1.8–2.4)
MCH RBC QN AUTO: 30.2 UUG (ref 24.7–32.8)
MCHC RBC AUTO-ENTMCNC: 33 G/DL (ref 32.3–35.6)
MCV RBC AUTO: 90.7 FL (ref 75.5–95.3)
MONOCYTES # BLD AUTO: 0.6 K/UL (ref 2–10)
MONOCYTES NFR BLD AUTO: 3.6 % (ref 0–11)
NEUTROPHILS # BLD AUTO: 14.6 K/UL (ref 1.8–8.9)
NEUTROPHILS NFR BLD AUTO: 89.1 % (ref 38.5–71.5)
PCO2 TEMP ADJ BLDA: 37.3 MMHG (ref 35–45)
PEEP SETTING VENT: 450 ML
PH TEMP ADJ BLDA: 7.45 [PH] (ref 7.35–7.45)
PHOSPHATE SERPL-MCNC: 5.3 MG/DL (ref 2.5–4.9)
PLATELET # BLD AUTO: 243 K/UL (ref 179–408)
PO2 TEMP ADJ BLDA: 115 MMHG (ref 75–100)
POTASSIUM SERPL-SCNC: 4.3 MMOL/L (ref 3.5–5.1)
RBC # BLD AUTO: 2.83 MIL/UL (ref 3.63–4.92)
SET RATE, BG: 12
SPECIMEN DRAWN FROM PATIENT: (no result)
VENTILATION MODE VENT: (no result)
WBC # BLD AUTO: 16.4 K/UL (ref 3.8–11.8)

## 2020-02-26 RX ADMIN — ATORVASTATIN CALCIUM SCH MG: 20 TABLET, FILM COATED ORAL at 21:04

## 2020-02-26 RX ADMIN — ACETAMINOPHEN PRN MG: 160 SOLUTION ORAL at 13:22

## 2020-02-26 RX ADMIN — METRONIDAZOLE SCH MLS/HR: 500 SOLUTION INTRAVENOUS at 13:19

## 2020-02-26 RX ADMIN — VANCOMYCIN HYDROCHLORIDE SCH MG: 500 INJECTION, POWDER, LYOPHILIZED, FOR SOLUTION INTRAVENOUS at 07:25

## 2020-02-26 RX ADMIN — CYANOCOBALAMIN TAB 1000 MCG SCH MCG: 1000 TAB at 08:54

## 2020-02-26 RX ADMIN — VANCOMYCIN HYDROCHLORIDE SCH MG: 500 INJECTION, POWDER, LYOPHILIZED, FOR SOLUTION INTRAVENOUS at 13:19

## 2020-02-26 RX ADMIN — VANCOMYCIN HYDROCHLORIDE SCH MG: 500 INJECTION, POWDER, LYOPHILIZED, FOR SOLUTION INTRAVENOUS at 19:47

## 2020-02-26 RX ADMIN — ACETAMINOPHEN PRN MG: 160 SOLUTION ORAL at 00:28

## 2020-02-26 RX ADMIN — GABAPENTIN SCH MG: 100 CAPSULE ORAL at 08:54

## 2020-02-26 RX ADMIN — LEVETIRACETAM SCH MG: 500 TABLET, FILM COATED ORAL at 21:03

## 2020-02-26 RX ADMIN — FUROSEMIDE SCH MG: 10 INJECTION, SOLUTION INTRAMUSCULAR; INTRAVENOUS at 00:05

## 2020-02-26 RX ADMIN — LACTOBACILLUS ACIDOPH-L.BULGARICUS 1 MILLION CELL CHEWABLE TABLET SCH TAB.CHEW: at 08:53

## 2020-02-26 RX ADMIN — Medication SCH ML: at 08:51

## 2020-02-26 RX ADMIN — Medication PRN MG: at 07:19

## 2020-02-26 RX ADMIN — LACTOBACILLUS ACIDOPH-L.BULGARICUS 1 MILLION CELL CHEWABLE TABLET SCH TAB.CHEW: at 17:48

## 2020-02-26 RX ADMIN — FOLIC ACID SCH MG: 1 TABLET ORAL at 08:53

## 2020-02-26 RX ADMIN — VALACYCLOVIR HYDROCHLORIDE SCH MG: 500 TABLET, FILM COATED ORAL at 08:53

## 2020-02-26 RX ADMIN — SENNOSIDES SCH TAB: 8.6 TABLET, COATED ORAL at 21:03

## 2020-02-26 RX ADMIN — VITAMIN D, TAB 1000IU (100/BT) SCH UNIT: 25 TAB at 08:54

## 2020-02-26 RX ADMIN — GABAPENTIN SCH MG: 100 CAPSULE ORAL at 21:03

## 2020-02-26 RX ADMIN — METRONIDAZOLE SCH MLS/HR: 500 SOLUTION INTRAVENOUS at 21:53

## 2020-02-26 RX ADMIN — SODIUM CHLORIDE PRN MLS/HR: 0.9 INJECTION, SOLUTION INTRAVENOUS at 10:22

## 2020-02-26 RX ADMIN — DEXTROSE SCH MLS/HR: 50 INJECTION, SOLUTION INTRAVENOUS at 20:47

## 2020-02-26 RX ADMIN — Medication PRN MG: at 21:26

## 2020-02-26 RX ADMIN — VANCOMYCIN HYDROCHLORIDE SCH MG: 500 INJECTION, POWDER, LYOPHILIZED, FOR SOLUTION INTRAVENOUS at 01:57

## 2020-02-26 RX ADMIN — LEVETIRACETAM SCH MG: 500 TABLET, FILM COATED ORAL at 08:54

## 2020-02-26 RX ADMIN — PANTOPRAZOLE SODIUM SCH MG: 40 GRANULE, DELAYED RELEASE ORAL at 08:53

## 2020-02-26 NOTE — NUR
no seizure , no fever  noted , mouth and lips . still noted with old blood , dark in color , 
dr henry was here  21: 30 pm , aware of image

## 2020-02-26 NOTE — NUR
SINUS TACHYCARDIA WITH HR >100  BPM @ THIS TIME. TEMP CHECKED RECTALLY. 101.3 F AT 
THIS TIME. ICE BATH GIVEN TO COOL DOWN BODY TEMP.

## 2020-02-26 NOTE — NUR
* Exhibits granulation/healing at site

* Exhibits decreased drainage at site

* Exhibits no s/s of infection

* Exhibits a  decrease in lesion size

* Maintains nutritional status

* Maintains hydration status

* Maintains optimal lab values

-------------------------------------------------------------------------------

Addendum: 02/26/20 at 0045 by ONI SCHMITT RN

-------------------------------------------------------------------------------

Amended: Links added.

## 2020-02-26 NOTE — NUR
* Exhibits granulation/healing at site

* Exhibits decreased drainage at site

* Exhibits no s/s of infection

* Exhibits a  decrease in lesion size

* Maintains nutritional status

-------------------------------------------------------------------------------

Addendum: 02/26/20 at 0045 by ONI SCHMITT RN

-------------------------------------------------------------------------------

Amended: Links added.

## 2020-02-26 NOTE — NUR
Patient was received orally intubated with 7.0 ETT~22cm at lip line. She is on Cook vent 
with the following settings of AC-12, Vt-450, PEEP+5, FIO2-30%. No signs of respiratory 
distress noted a this time. Suctioned small amounts of thin blood. RN aware of pt's bloody 
mouth. Oral care rendered with caution.  Resus. bag at bedside. Alarms assessed and are 
on/audible. Will continue to monitor.

## 2020-02-26 NOTE — NUR
* Maintains body temperature below 39 C

   degrees

* Maintains respiratory rate and heart

   rate WNL

* Maintains optimal lab values

-------------------------------------------------------------------------------

Addendum: 02/26/20 at 0045 by ONI SCHMITT RN

-------------------------------------------------------------------------------

Amended: Links added.

## 2020-02-26 NOTE — NUR
* Discusses measures to prevent

   aspiration

* Maintains normal breath sounds

* Maintains normal WBC

* Maintains vital signs WNL

-------------------------------------------------------------------------------

Addendum: 02/26/20 at 0045 by ONI SCHMITT RN

-------------------------------------------------------------------------------

Amended: Links added.

## 2020-02-26 NOTE — NUR
RECEIVED PT 0700. PT OBTUNDED & SLIGHTLY OPENS EYES TO DEEP PAIN. SEVERE WEAKNESS IN ALL 
EXTREMITIES. SINUS RHYTHM. HR 98 BPM @ THIS TIME. PITTING EDEMA IN ALL EXTREMITIES. PICC @ 
LUCRETIA WITH 0.9% NS INFUSED AT 10ML/HR. PT ORALLY INTUBATED WITH 7.0 ET 22 CM. AC-12 Vt-450 
PEEP OF 5 FiO2 OF 30%. NO DISTRESS NOTED. RR IS 24 BREATH/MIN. RHONCHI NOTED. FLEXI SEAL 
WITH LIQUID STOOL PRESENT. PT RECEIVING GLUCERNA @ 50ML/HR. RODRIGUES CATH CARED FOR, URINE 
CLEAR AND YELLOW. DTI POSTERIOR SACRUM, NO TUNNELING, UNABLE TO ASSESS SIZE. SKIN TEAR L ARM 
WITH SCANT EXUDATE. BOTH WOUNDS COVERED WITH MEPELEX AND WILL CONTINUE TO HEAL. AFEBRILE. 
T-98.6F SBP UP  AND PT APPEARS TO BE GENERALLY IN DISCOMFORT. MEDICATED WITH  MORPHINE 
2MG SLOW IVP AS ORDERED.

## 2020-02-26 NOTE — NUR
RECEIVED PT RESPONDING TO DEEP PAINFUL STIMULI. ORALLY INTUBATED TO VENT W/ SETTINGS OF 
AC-12, TV-450, FIO2-30%, PEEP-+5 W/ O2 SAT OF 99%. NGT ON R NARE, CHECKED PLACEMENT & 
CHECKED RESIDUAL 120CC NOTED, CONT. TO HOLD TUBE FDG. NS @ 10CC/HR  VIA PICC ON LUCRETIA. 
REPOSITIONED ON HER SIDE W/ HOB ELEVATED.

## 2020-02-26 NOTE — NUR
* Understands anatomy and physiology

* Describes reportable s/s

* Understands treatment plan*

Identifies current stressors

* Develops effective coping behaviors

* Uses support services as appropriate

* Understands medication regime

-------------------------------------------------------------------------------

Addendum: 02/26/20 at 0045 by ONI SCHMITT RN

-------------------------------------------------------------------------------

Amended: Links added.

## 2020-02-26 NOTE — NUR
* Maintains vital signs WNL

* Maintains urine output > 30 ml/hr

* Evidences normal skin turgor

* Maintains optimal lab values

* Maintains urine specific gravity WNL

* Evidences no significant weight

   fluctuations

-------------------------------------------------------------------------------

Addendum: 02/26/20 at 0045 by ONI SCHMITT RN

-------------------------------------------------------------------------------

Amended: Links added.

## 2020-02-27 VITALS — SYSTOLIC BLOOD PRESSURE: 150 MMHG | DIASTOLIC BLOOD PRESSURE: 89 MMHG

## 2020-02-27 VITALS — DIASTOLIC BLOOD PRESSURE: 94 MMHG | SYSTOLIC BLOOD PRESSURE: 153 MMHG

## 2020-02-27 VITALS — SYSTOLIC BLOOD PRESSURE: 126 MMHG | DIASTOLIC BLOOD PRESSURE: 71 MMHG

## 2020-02-27 VITALS — SYSTOLIC BLOOD PRESSURE: 138 MMHG | DIASTOLIC BLOOD PRESSURE: 82 MMHG

## 2020-02-27 VITALS — DIASTOLIC BLOOD PRESSURE: 72 MMHG | SYSTOLIC BLOOD PRESSURE: 126 MMHG

## 2020-02-27 VITALS — DIASTOLIC BLOOD PRESSURE: 80 MMHG | SYSTOLIC BLOOD PRESSURE: 145 MMHG

## 2020-02-27 VITALS — SYSTOLIC BLOOD PRESSURE: 133 MMHG | DIASTOLIC BLOOD PRESSURE: 79 MMHG

## 2020-02-27 VITALS — DIASTOLIC BLOOD PRESSURE: 82 MMHG | SYSTOLIC BLOOD PRESSURE: 140 MMHG

## 2020-02-27 VITALS — DIASTOLIC BLOOD PRESSURE: 76 MMHG | SYSTOLIC BLOOD PRESSURE: 133 MMHG

## 2020-02-27 VITALS — DIASTOLIC BLOOD PRESSURE: 67 MMHG | SYSTOLIC BLOOD PRESSURE: 118 MMHG

## 2020-02-27 VITALS — SYSTOLIC BLOOD PRESSURE: 137 MMHG | DIASTOLIC BLOOD PRESSURE: 77 MMHG

## 2020-02-27 VITALS — SYSTOLIC BLOOD PRESSURE: 141 MMHG | DIASTOLIC BLOOD PRESSURE: 81 MMHG

## 2020-02-27 VITALS — SYSTOLIC BLOOD PRESSURE: 138 MMHG | DIASTOLIC BLOOD PRESSURE: 78 MMHG

## 2020-02-27 VITALS — DIASTOLIC BLOOD PRESSURE: 81 MMHG | SYSTOLIC BLOOD PRESSURE: 138 MMHG

## 2020-02-27 VITALS — SYSTOLIC BLOOD PRESSURE: 149 MMHG | DIASTOLIC BLOOD PRESSURE: 86 MMHG

## 2020-02-27 VITALS — SYSTOLIC BLOOD PRESSURE: 139 MMHG | DIASTOLIC BLOOD PRESSURE: 76 MMHG

## 2020-02-27 VITALS — SYSTOLIC BLOOD PRESSURE: 153 MMHG | DIASTOLIC BLOOD PRESSURE: 94 MMHG

## 2020-02-27 VITALS — DIASTOLIC BLOOD PRESSURE: 90 MMHG | SYSTOLIC BLOOD PRESSURE: 152 MMHG

## 2020-02-27 VITALS — SYSTOLIC BLOOD PRESSURE: 130 MMHG | DIASTOLIC BLOOD PRESSURE: 72 MMHG

## 2020-02-27 VITALS — SYSTOLIC BLOOD PRESSURE: 139 MMHG | DIASTOLIC BLOOD PRESSURE: 77 MMHG

## 2020-02-27 VITALS — SYSTOLIC BLOOD PRESSURE: 135 MMHG | DIASTOLIC BLOOD PRESSURE: 79 MMHG

## 2020-02-27 VITALS — DIASTOLIC BLOOD PRESSURE: 73 MMHG | SYSTOLIC BLOOD PRESSURE: 127 MMHG

## 2020-02-27 VITALS — SYSTOLIC BLOOD PRESSURE: 142 MMHG | DIASTOLIC BLOOD PRESSURE: 77 MMHG

## 2020-02-27 VITALS — DIASTOLIC BLOOD PRESSURE: 77 MMHG | SYSTOLIC BLOOD PRESSURE: 138 MMHG

## 2020-02-27 VITALS — DIASTOLIC BLOOD PRESSURE: 67 MMHG | SYSTOLIC BLOOD PRESSURE: 116 MMHG

## 2020-02-27 LAB
APPEARANCE UR: (no result)
BASE EXCESS BLDA CALC-SCNC: 1 MMOL/L
BASOPHILS # BLD AUTO: 0 K/UL (ref 0–8)
BASOPHILS NFR BLD AUTO: 0.2 % (ref 0–2)
BILIRUB UR QL STRIP: NEGATIVE
BUN SERPL-MCNC: 60 MG/DL (ref 7–18)
CHLORIDE SERPL-SCNC: 108 MMOL/L (ref 98–107)
CO2 SERPL-SCNC: 28 MMOL/L (ref 21–32)
COLOR UR: YELLOW
CREAT SERPL-MCNC: 1.6 MG/DL (ref 0.6–1.3)
DEPRECATED SQUAMOUS URNS QL MICRO: (no result) /HPF
EOSINOPHIL # BLD AUTO: 0 K/UL (ref 0–0.7)
EOSINOPHIL NFR BLD AUTO: 0.2 % (ref 0–7)
GLUCOSE SERPL-MCNC: 104 MG/DL (ref 74–106)
GLUCOSE UR STRIP-MCNC: NEGATIVE MG/DL
HCO3 BLDA-SCNC: 24.8 MMOL/L
HCT VFR BLD AUTO: 25.8 % (ref 31.2–41.9)
HGB BLD-MCNC: 8.5 G/DL (ref 10.9–14.3)
HGB BLDA OXIMETRY-MCNC: 0 G/DL (ref 12–16)
HGB UR QL STRIP: (no result)
INHALED O2 CONCENTRATION: 30 %
INTRINSIC PEEP RESPIRATORY: 5 CMH20
KETONES UR STRIP-MCNC: NEGATIVE MG/DL
LEUKOCYTE ESTERASE UR QL STRIP: NEGATIVE
LYMPHOCYTES # BLD AUTO: 1 K/UL (ref 20–40)
LYMPHOCYTES NFR BLD AUTO: 6.7 % (ref 20.5–51.5)
MAGNESIUM SERPL-MCNC: 2.1 MG/DL (ref 1.8–2.4)
MCH RBC QN AUTO: 30.1 UUG (ref 24.7–32.8)
MCHC RBC AUTO-ENTMCNC: 33 G/DL (ref 32.3–35.6)
MCV RBC AUTO: 91.5 FL (ref 75.5–95.3)
MONOCYTES # BLD AUTO: 0.5 K/UL (ref 2–10)
MONOCYTES NFR BLD AUTO: 3.5 % (ref 0–11)
NEUTROPHILS # BLD AUTO: 13 K/UL (ref 1.8–8.9)
NEUTROPHILS NFR BLD AUTO: 89.4 % (ref 38.5–71.5)
NITRITE UR QL STRIP: NEGATIVE
PCO2 TEMP ADJ BLDA: 36.9 MMHG (ref 35–45)
PEEP SETTING VENT: 450 ML
PH TEMP ADJ BLDA: 7.45 [PH] (ref 7.35–7.45)
PH UR STRIP: 5 [PH] (ref 5–8)
PHOSPHATE SERPL-MCNC: 5.2 MG/DL (ref 2.5–4.9)
PLATELET # BLD AUTO: 277 K/UL (ref 179–408)
PO2 TEMP ADJ BLDA: 105.8 MMHG (ref 75–100)
POTASSIUM SERPL-SCNC: 4.3 MMOL/L (ref 3.5–5.1)
RBC # BLD AUTO: 2.82 MIL/UL (ref 3.63–4.92)
RBC #/AREA URNS HPF: (no result) /HPF (ref 0–3)
SET RATE, BG: 14
SP GR UR STRIP: 1.02 (ref 1–1.03)
SPECIMEN DRAWN FROM PATIENT: (no result)
UROBILINOGEN UR STRIP-MCNC: 0.2 E.U./DL
VENTILATION MODE VENT: (no result)
WBC # BLD AUTO: 14.6 K/UL (ref 3.8–11.8)
WBC #/AREA URNS HPF: (no result) /HPF
WBC #/AREA URNS HPF: (no result) /HPF (ref 0–3)
YEAST URNS QL MICRO: (no result) /HPF

## 2020-02-27 PROCEDURE — 4A00X4Z MEASUREMENT OF CENTRAL NERVOUS ELECTRICAL ACTIVITY, EXTERNAL APPROACH: ICD-10-PCS | Performed by: PSYCHIATRY & NEUROLOGY

## 2020-02-27 RX ADMIN — PANTOPRAZOLE SODIUM SCH MG: 40 GRANULE, DELAYED RELEASE ORAL at 08:54

## 2020-02-27 RX ADMIN — BACITRACIN, NEOMYCIN, POLYMYXIN B SCH GM: 400; 3.5; 5 OINTMENT TOPICAL at 18:37

## 2020-02-27 RX ADMIN — VANCOMYCIN HYDROCHLORIDE SCH MG: 500 INJECTION, POWDER, LYOPHILIZED, FOR SOLUTION INTRAVENOUS at 02:06

## 2020-02-27 RX ADMIN — GABAPENTIN SCH MG: 100 CAPSULE ORAL at 08:54

## 2020-02-27 RX ADMIN — DEXTROSE SCH MLS/HR: 50 INJECTION, SOLUTION INTRAVENOUS at 20:32

## 2020-02-27 RX ADMIN — ANORECTAL OINTMENT PRN APPLIC: 15.7; .44; 24; 20.6 OINTMENT TOPICAL at 23:31

## 2020-02-27 RX ADMIN — ATORVASTATIN CALCIUM SCH MG: 20 TABLET, FILM COATED ORAL at 20:36

## 2020-02-27 RX ADMIN — VANCOMYCIN HYDROCHLORIDE SCH MG: 500 INJECTION, POWDER, LYOPHILIZED, FOR SOLUTION INTRAVENOUS at 14:55

## 2020-02-27 RX ADMIN — METRONIDAZOLE SCH MLS/HR: 500 SOLUTION INTRAVENOUS at 14:55

## 2020-02-27 RX ADMIN — ACETAMINOPHEN PRN MG: 160 SOLUTION ORAL at 18:37

## 2020-02-27 RX ADMIN — DEXTROSE AND SODIUM CHLORIDE PRN MLS/HR: 5; .45 INJECTION, SOLUTION INTRAVENOUS at 20:32

## 2020-02-27 RX ADMIN — LEVETIRACETAM SCH MG: 500 TABLET, FILM COATED ORAL at 20:36

## 2020-02-27 RX ADMIN — Medication SCH ML: at 08:56

## 2020-02-27 RX ADMIN — VITAMIN D, TAB 1000IU (100/BT) SCH UNIT: 25 TAB at 08:54

## 2020-02-27 RX ADMIN — VANCOMYCIN HYDROCHLORIDE SCH MG: 500 INJECTION, POWDER, LYOPHILIZED, FOR SOLUTION INTRAVENOUS at 08:56

## 2020-02-27 RX ADMIN — LACTOBACILLUS ACIDOPH-L.BULGARICUS 1 MILLION CELL CHEWABLE TABLET SCH TAB.CHEW: at 17:54

## 2020-02-27 RX ADMIN — METRONIDAZOLE SCH MLS/HR: 500 SOLUTION INTRAVENOUS at 05:33

## 2020-02-27 RX ADMIN — VANCOMYCIN HYDROCHLORIDE SCH MG: 500 INJECTION, POWDER, LYOPHILIZED, FOR SOLUTION INTRAVENOUS at 19:28

## 2020-02-27 RX ADMIN — LEVETIRACETAM SCH MG: 500 TABLET, FILM COATED ORAL at 08:54

## 2020-02-27 RX ADMIN — SENNOSIDES SCH TAB: 8.6 TABLET, COATED ORAL at 20:36

## 2020-02-27 RX ADMIN — METRONIDAZOLE SCH MLS/HR: 500 SOLUTION INTRAVENOUS at 21:40

## 2020-02-27 RX ADMIN — CYANOCOBALAMIN TAB 1000 MCG SCH MCG: 1000 TAB at 08:55

## 2020-02-27 RX ADMIN — LACTOBACILLUS ACIDOPH-L.BULGARICUS 1 MILLION CELL CHEWABLE TABLET SCH TAB.CHEW: at 08:54

## 2020-02-27 RX ADMIN — FOLIC ACID SCH MG: 1 TABLET ORAL at 08:55

## 2020-02-27 RX ADMIN — VALACYCLOVIR HYDROCHLORIDE SCH MG: 500 TABLET, FILM COATED ORAL at 08:54

## 2020-02-27 NOTE — NUR
am care done. ngt residual 150cc, hold fdg. repositioned pt.w/ hob elevated.

-------------------------------------------------------------------------------

Addendum: 02/27/20 at 0633 by MIR PEREA RN

-------------------------------------------------------------------------------

ngt residual 100cc.

## 2020-02-27 NOTE — NUR
PATIENT IS SCHEDULED FOR TRACH TOMORROW AT 11. CONSENT PRINTED OUT AND READY FOR DOCTOR 
ROMEO AND DOCTOR RONI.

## 2020-02-27 NOTE — NUR
RECEIVED PT.RESPONDING TO PAINFUL STIMULI. ORALLY INTUBATED TO VENT W/ SETTINGS OF AC-12, 
TV-450, FIO2-30%, PEEP-+5 W/ O2 SAT %. NGT ON R NARE, CHECKED PLACEMENT, CHECKED 
RESIDUAL 20CC NOTED.PICC LINE ON LUCRETIA INTACT & PATENT. NS @ TKO RATE FOR IVPB. FLEXISEAL TO 
GRAVITY DRAINAGE BAG W/ LIQUIDISH GRAYISH STOOL.REPOSITIONED ON HER SIDE W/ HOB ELEVATED.

## 2020-02-28 VITALS — SYSTOLIC BLOOD PRESSURE: 150 MMHG | DIASTOLIC BLOOD PRESSURE: 86 MMHG

## 2020-02-28 VITALS — DIASTOLIC BLOOD PRESSURE: 86 MMHG | SYSTOLIC BLOOD PRESSURE: 144 MMHG

## 2020-02-28 VITALS — SYSTOLIC BLOOD PRESSURE: 144 MMHG | DIASTOLIC BLOOD PRESSURE: 79 MMHG

## 2020-02-28 VITALS — DIASTOLIC BLOOD PRESSURE: 90 MMHG | SYSTOLIC BLOOD PRESSURE: 155 MMHG

## 2020-02-28 VITALS — DIASTOLIC BLOOD PRESSURE: 74 MMHG | SYSTOLIC BLOOD PRESSURE: 135 MMHG

## 2020-02-28 VITALS — DIASTOLIC BLOOD PRESSURE: 83 MMHG | SYSTOLIC BLOOD PRESSURE: 138 MMHG

## 2020-02-28 VITALS — DIASTOLIC BLOOD PRESSURE: 75 MMHG | SYSTOLIC BLOOD PRESSURE: 132 MMHG

## 2020-02-28 VITALS — DIASTOLIC BLOOD PRESSURE: 88 MMHG | SYSTOLIC BLOOD PRESSURE: 156 MMHG

## 2020-02-28 VITALS — SYSTOLIC BLOOD PRESSURE: 156 MMHG | DIASTOLIC BLOOD PRESSURE: 91 MMHG

## 2020-02-28 VITALS — SYSTOLIC BLOOD PRESSURE: 156 MMHG | DIASTOLIC BLOOD PRESSURE: 81 MMHG

## 2020-02-28 VITALS — SYSTOLIC BLOOD PRESSURE: 133 MMHG | DIASTOLIC BLOOD PRESSURE: 77 MMHG

## 2020-02-28 VITALS — DIASTOLIC BLOOD PRESSURE: 93 MMHG | SYSTOLIC BLOOD PRESSURE: 156 MMHG

## 2020-02-28 VITALS — DIASTOLIC BLOOD PRESSURE: 76 MMHG | SYSTOLIC BLOOD PRESSURE: 148 MMHG

## 2020-02-28 VITALS — SYSTOLIC BLOOD PRESSURE: 152 MMHG | DIASTOLIC BLOOD PRESSURE: 87 MMHG

## 2020-02-28 VITALS — SYSTOLIC BLOOD PRESSURE: 139 MMHG | DIASTOLIC BLOOD PRESSURE: 81 MMHG

## 2020-02-28 VITALS — SYSTOLIC BLOOD PRESSURE: 137 MMHG | DIASTOLIC BLOOD PRESSURE: 77 MMHG

## 2020-02-28 VITALS — DIASTOLIC BLOOD PRESSURE: 85 MMHG | SYSTOLIC BLOOD PRESSURE: 140 MMHG

## 2020-02-28 VITALS — SYSTOLIC BLOOD PRESSURE: 144 MMHG | DIASTOLIC BLOOD PRESSURE: 81 MMHG

## 2020-02-28 VITALS — DIASTOLIC BLOOD PRESSURE: 82 MMHG | SYSTOLIC BLOOD PRESSURE: 146 MMHG

## 2020-02-28 VITALS — SYSTOLIC BLOOD PRESSURE: 137 MMHG | DIASTOLIC BLOOD PRESSURE: 76 MMHG

## 2020-02-28 VITALS — DIASTOLIC BLOOD PRESSURE: 84 MMHG | SYSTOLIC BLOOD PRESSURE: 152 MMHG

## 2020-02-28 VITALS — SYSTOLIC BLOOD PRESSURE: 138 MMHG | DIASTOLIC BLOOD PRESSURE: 78 MMHG

## 2020-02-28 VITALS — DIASTOLIC BLOOD PRESSURE: 82 MMHG | SYSTOLIC BLOOD PRESSURE: 148 MMHG

## 2020-02-28 VITALS — DIASTOLIC BLOOD PRESSURE: 87 MMHG | SYSTOLIC BLOOD PRESSURE: 142 MMHG

## 2020-02-28 LAB
BASE EXCESS BLDA CALC-SCNC: -0.1 MMOL/L
BASOPHILS # BLD AUTO: 0.1 K/UL (ref 0–8)
BASOPHILS NFR BLD AUTO: 0.5 % (ref 0–2)
BUN SERPL-MCNC: 64 MG/DL (ref 7–18)
CHLORIDE SERPL-SCNC: 111 MMOL/L (ref 98–107)
CO2 SERPL-SCNC: 27 MMOL/L (ref 21–32)
CREAT SERPL-MCNC: 1.7 MG/DL (ref 0.6–1.3)
EOSINOPHIL # BLD AUTO: 0 K/UL (ref 0–0.7)
EOSINOPHIL NFR BLD AUTO: 0.3 % (ref 0–7)
GLUCOSE SERPL-MCNC: 112 MG/DL (ref 74–106)
HCO3 BLDA-SCNC: 24.1 MMOL/L
HCT VFR BLD AUTO: 24 % (ref 31.2–41.9)
HGB BLD-MCNC: 7.9 G/DL (ref 10.9–14.3)
HGB BLDA OXIMETRY-MCNC: 10 G/DL (ref 12–16)
INHALED O2 CONCENTRATION: 30 %
INTRINSIC PEEP RESPIRATORY: 5 CMH20
LYMPHOCYTES # BLD AUTO: 1 K/UL (ref 20–40)
LYMPHOCYTES NFR BLD AUTO: 6.9 % (ref 20.5–51.5)
MAGNESIUM SERPL-MCNC: 2.2 MG/DL (ref 1.8–2.4)
MCH RBC QN AUTO: 30.3 UUG (ref 24.7–32.8)
MCHC RBC AUTO-ENTMCNC: 33 G/DL (ref 32.3–35.6)
MCV RBC AUTO: 91.7 FL (ref 75.5–95.3)
MONOCYTES # BLD AUTO: 0.5 K/UL (ref 2–10)
MONOCYTES NFR BLD AUTO: 3.6 % (ref 0–11)
NEUTROPHILS # BLD AUTO: 12.9 K/UL (ref 1.8–8.9)
NEUTROPHILS NFR BLD AUTO: 88.7 % (ref 38.5–71.5)
PCO2 TEMP ADJ BLDA: 37.6 MMHG (ref 35–45)
PEEP SETTING VENT: 450 ML
PH TEMP ADJ BLDA: 7.42 [PH] (ref 7.35–7.45)
PHOSPHATE SERPL-MCNC: 5.5 MG/DL (ref 2.5–4.9)
PLATELET # BLD AUTO: 290 K/UL (ref 179–408)
PO2 TEMP ADJ BLDA: 108.4 MMHG (ref 75–100)
POTASSIUM SERPL-SCNC: 4.4 MMOL/L (ref 3.5–5.1)
RBC # BLD AUTO: 2.62 MIL/UL (ref 3.63–4.92)
SET RATE, BG: 12
SPECIMEN DRAWN FROM PATIENT: (no result)
VENTILATION MODE VENT: (no result)
WBC # BLD AUTO: 14.5 K/UL (ref 3.8–11.8)

## 2020-02-28 RX ADMIN — VANCOMYCIN HYDROCHLORIDE SCH MG: 500 INJECTION, POWDER, LYOPHILIZED, FOR SOLUTION INTRAVENOUS at 13:26

## 2020-02-28 RX ADMIN — Medication PRN ML: at 18:58

## 2020-02-28 RX ADMIN — DEXTROSE SCH MLS/HR: 50 INJECTION, SOLUTION INTRAVENOUS at 20:36

## 2020-02-28 RX ADMIN — SENNOSIDES SCH TAB: 8.6 TABLET, COATED ORAL at 20:37

## 2020-02-28 RX ADMIN — VANCOMYCIN HYDROCHLORIDE SCH MG: 500 INJECTION, POWDER, LYOPHILIZED, FOR SOLUTION INTRAVENOUS at 20:36

## 2020-02-28 RX ADMIN — ATORVASTATIN CALCIUM SCH MG: 20 TABLET, FILM COATED ORAL at 20:36

## 2020-02-28 RX ADMIN — LEVETIRACETAM SCH MG: 500 TABLET, FILM COATED ORAL at 08:47

## 2020-02-28 RX ADMIN — DEXTROSE AND SODIUM CHLORIDE PRN MLS/HR: 5; .45 INJECTION, SOLUTION INTRAVENOUS at 13:30

## 2020-02-28 RX ADMIN — CYANOCOBALAMIN TAB 1000 MCG SCH MCG: 1000 TAB at 08:49

## 2020-02-28 RX ADMIN — VALACYCLOVIR HYDROCHLORIDE SCH MG: 500 TABLET, FILM COATED ORAL at 08:48

## 2020-02-28 RX ADMIN — Medication SCH ML: at 08:44

## 2020-02-28 RX ADMIN — PANTOPRAZOLE SODIUM SCH MG: 40 GRANULE, DELAYED RELEASE ORAL at 08:47

## 2020-02-28 RX ADMIN — VITAMIN D, TAB 1000IU (100/BT) SCH UNIT: 25 TAB at 08:48

## 2020-02-28 RX ADMIN — Medication PRN MG: at 03:04

## 2020-02-28 RX ADMIN — SODIUM CHLORIDE PRN MLS/HR: 0.9 INJECTION, SOLUTION INTRAVENOUS at 05:54

## 2020-02-28 RX ADMIN — VANCOMYCIN HYDROCHLORIDE SCH MG: 500 INJECTION, POWDER, LYOPHILIZED, FOR SOLUTION INTRAVENOUS at 08:45

## 2020-02-28 RX ADMIN — VANCOMYCIN HYDROCHLORIDE SCH MG: 500 INJECTION, POWDER, LYOPHILIZED, FOR SOLUTION INTRAVENOUS at 02:27

## 2020-02-28 RX ADMIN — METRONIDAZOLE SCH MLS/HR: 500 SOLUTION INTRAVENOUS at 05:52

## 2020-02-28 RX ADMIN — METRONIDAZOLE SCH MLS/HR: 500 SOLUTION INTRAVENOUS at 13:26

## 2020-02-28 RX ADMIN — LACTOBACILLUS ACIDOPH-L.BULGARICUS 1 MILLION CELL CHEWABLE TABLET SCH TAB.CHEW: at 08:47

## 2020-02-28 RX ADMIN — LEVETIRACETAM SCH MG: 500 TABLET, FILM COATED ORAL at 20:36

## 2020-02-28 RX ADMIN — BACITRACIN, NEOMYCIN, POLYMYXIN B SCH GM: 400; 3.5; 5 OINTMENT TOPICAL at 08:48

## 2020-02-28 RX ADMIN — LACTOBACILLUS ACIDOPH-L.BULGARICUS 1 MILLION CELL CHEWABLE TABLET SCH TAB.CHEW: at 17:27

## 2020-02-28 RX ADMIN — FOLIC ACID SCH MG: 1 TABLET ORAL at 08:47

## 2020-02-28 RX ADMIN — METRONIDAZOLE SCH MLS/HR: 500 SOLUTION INTRAVENOUS at 21:47

## 2020-02-28 NOTE — NUR
* Exhibits granulation/healing at site

* Exhibits decreased drainage at site

* Exhibits no s/s of infection

* Exhibits a  decrease in lesion size

* Maintains nutritional status

-------------------------------------------------------------------------------

Addendum: 02/28/20 at 2358 by ONI SCHMITT RN

-------------------------------------------------------------------------------

Amended: Links added.

-------------------------------------------------------------------------------

Addendum: 02/29/20 at 0000 by ONI SCHMITT RN

-------------------------------------------------------------------------------

Amended: Links added.

## 2020-02-28 NOTE — NUR
* Exhibits granulation/healing at site

* Exhibits decreased drainage at site

* Exhibits no s/s of infection

* Exhibits a  decrease in lesion size

* Maintains nutritional status

-------------------------------------------------------------------------------

Addendum: 02/29/20 at 0000 by ONI SCHMITT RN

-------------------------------------------------------------------------------

Amended: Links added.

## 2020-02-28 NOTE — NUR
patietn is no responsive no no spontaneous eye opening ,   picc line laura , kapoor intact , 
vent setting , ac 12 v 450 , p 5 fio2 at 30 %,  lips and mouth had old blood , cleansed with 
moist cloth and suction

## 2020-02-28 NOTE — NUR
* Understands anatomy and physiology

* Describes reportable s/s

* Understands treatment plan*

Identifies current stressors

* Develops effective coping behaviors

* Uses support services as appropriate

* Understands medication regime

-------------------------------------------------------------------------------

Addendum: 02/28/20 at 2358 by ONI SCHMITT RN

-------------------------------------------------------------------------------

Amended: Links added.

-------------------------------------------------------------------------------

Addendum: 02/29/20 at 0000 by ONI SCHMITT RN

-------------------------------------------------------------------------------

Amended: Links added.

## 2020-02-28 NOTE — NUR
* Maintains blood gasses WNL

* Evidences usual mental status

* Exhibits usual skin color

-------------------------------------------------------------------------------

Addendum: 02/29/20 at 0000 by ONI SCHMITT RN

-------------------------------------------------------------------------------

Amended: Links added.

## 2020-02-28 NOTE — NUR
* Identifies current stressors

* Develops effective coping behaviors

* Uses support services as appropriate

-------------------------------------------------------------------------------

Addendum: 02/28/20 at 2358 verna SCHMITT RN

-------------------------------------------------------------------------------

Amended: Links added.

-------------------------------------------------------------------------------

Addendum: 02/29/20 at 0000 verna SCHMITT RN

-------------------------------------------------------------------------------

Amended: Links added.

## 2020-02-28 NOTE — NUR
Exhibits no s/s of infection

* Exhibits a  decrease in lesion size

* Maintains nutritional status

* Maintains hydration status

* Maintains optimal lab values

-------------------------------------------------------------------------------

Addendum: 02/29/20 at 0000 by ONI SCHMITT RN

-------------------------------------------------------------------------------

Amended: Links added.

## 2020-02-28 NOTE — NUR
Patient NPO currently, awaiting trach placement. Receiving Prostat with meds. Unclear 
overall plan for this patient as there are no family memebers. Recommend resume TF as soon 
as able following trach placement. Will continue to monitor per beto and prn.

-------------------------------------------------------------------------------

Addendum: 02/28/20 at 1754 by ANASTASIYA TOLENTINO RD RD

-------------------------------------------------------------------------------

Amended: Links added.

## 2020-02-29 VITALS — SYSTOLIC BLOOD PRESSURE: 135 MMHG | DIASTOLIC BLOOD PRESSURE: 75 MMHG

## 2020-02-29 VITALS — DIASTOLIC BLOOD PRESSURE: 82 MMHG | SYSTOLIC BLOOD PRESSURE: 145 MMHG

## 2020-02-29 VITALS — SYSTOLIC BLOOD PRESSURE: 149 MMHG | DIASTOLIC BLOOD PRESSURE: 83 MMHG

## 2020-02-29 VITALS — DIASTOLIC BLOOD PRESSURE: 72 MMHG | SYSTOLIC BLOOD PRESSURE: 128 MMHG

## 2020-02-29 VITALS — SYSTOLIC BLOOD PRESSURE: 133 MMHG | DIASTOLIC BLOOD PRESSURE: 79 MMHG

## 2020-02-29 VITALS — SYSTOLIC BLOOD PRESSURE: 125 MMHG | DIASTOLIC BLOOD PRESSURE: 67 MMHG

## 2020-02-29 VITALS — SYSTOLIC BLOOD PRESSURE: 146 MMHG | DIASTOLIC BLOOD PRESSURE: 61 MMHG

## 2020-02-29 VITALS — DIASTOLIC BLOOD PRESSURE: 89 MMHG | SYSTOLIC BLOOD PRESSURE: 148 MMHG

## 2020-02-29 VITALS — SYSTOLIC BLOOD PRESSURE: 149 MMHG | DIASTOLIC BLOOD PRESSURE: 88 MMHG

## 2020-02-29 VITALS — SYSTOLIC BLOOD PRESSURE: 136 MMHG | DIASTOLIC BLOOD PRESSURE: 76 MMHG

## 2020-02-29 VITALS — DIASTOLIC BLOOD PRESSURE: 86 MMHG | SYSTOLIC BLOOD PRESSURE: 152 MMHG

## 2020-02-29 VITALS — DIASTOLIC BLOOD PRESSURE: 76 MMHG | SYSTOLIC BLOOD PRESSURE: 136 MMHG

## 2020-02-29 VITALS — SYSTOLIC BLOOD PRESSURE: 145 MMHG | DIASTOLIC BLOOD PRESSURE: 82 MMHG

## 2020-02-29 VITALS — DIASTOLIC BLOOD PRESSURE: 74 MMHG | SYSTOLIC BLOOD PRESSURE: 132 MMHG

## 2020-02-29 VITALS — SYSTOLIC BLOOD PRESSURE: 122 MMHG | DIASTOLIC BLOOD PRESSURE: 57 MMHG

## 2020-02-29 VITALS — DIASTOLIC BLOOD PRESSURE: 72 MMHG | SYSTOLIC BLOOD PRESSURE: 131 MMHG

## 2020-02-29 VITALS — SYSTOLIC BLOOD PRESSURE: 150 MMHG | DIASTOLIC BLOOD PRESSURE: 81 MMHG

## 2020-02-29 VITALS — SYSTOLIC BLOOD PRESSURE: 132 MMHG | DIASTOLIC BLOOD PRESSURE: 74 MMHG

## 2020-02-29 VITALS — DIASTOLIC BLOOD PRESSURE: 85 MMHG | SYSTOLIC BLOOD PRESSURE: 148 MMHG

## 2020-02-29 VITALS — DIASTOLIC BLOOD PRESSURE: 85 MMHG | SYSTOLIC BLOOD PRESSURE: 146 MMHG

## 2020-02-29 VITALS — SYSTOLIC BLOOD PRESSURE: 148 MMHG | DIASTOLIC BLOOD PRESSURE: 87 MMHG

## 2020-02-29 VITALS — SYSTOLIC BLOOD PRESSURE: 140 MMHG | DIASTOLIC BLOOD PRESSURE: 77 MMHG

## 2020-02-29 VITALS — DIASTOLIC BLOOD PRESSURE: 84 MMHG | SYSTOLIC BLOOD PRESSURE: 144 MMHG

## 2020-02-29 VITALS — DIASTOLIC BLOOD PRESSURE: 83 MMHG | SYSTOLIC BLOOD PRESSURE: 143 MMHG

## 2020-02-29 VITALS — SYSTOLIC BLOOD PRESSURE: 135 MMHG | DIASTOLIC BLOOD PRESSURE: 77 MMHG

## 2020-02-29 VITALS — DIASTOLIC BLOOD PRESSURE: 71 MMHG | SYSTOLIC BLOOD PRESSURE: 122 MMHG

## 2020-02-29 VITALS — DIASTOLIC BLOOD PRESSURE: 77 MMHG | SYSTOLIC BLOOD PRESSURE: 127 MMHG

## 2020-02-29 LAB
BASE EXCESS BLDA CALC-SCNC: -0.3 MMOL/L
BASOPHILS # BLD AUTO: 0.1 K/UL (ref 0–8)
BASOPHILS NFR BLD AUTO: 0.4 % (ref 0–2)
BUN SERPL-MCNC: 67 MG/DL (ref 7–18)
CHLORIDE SERPL-SCNC: 108 MMOL/L (ref 98–107)
CO2 SERPL-SCNC: 26 MMOL/L (ref 21–32)
CREAT SERPL-MCNC: 1.6 MG/DL (ref 0.6–1.3)
EOSINOPHIL # BLD AUTO: 0 K/UL (ref 0–0.7)
EOSINOPHIL NFR BLD AUTO: 0.2 % (ref 0–7)
GLUCOSE SERPL-MCNC: 121 MG/DL (ref 74–106)
HCO3 BLDA-SCNC: 23.6 MMOL/L
HCT VFR BLD AUTO: 23 % (ref 31.2–41.9)
HGB BLD-MCNC: 7.5 G/DL (ref 10.9–14.3)
HGB BLDA OXIMETRY-MCNC: 8.1 G/DL (ref 12–16)
INHALED O2 CONCENTRATION: 30 %
INTRINSIC PEEP RESPIRATORY: 5 CMH20
LYMPHOCYTES # BLD AUTO: 0.9 K/UL (ref 20–40)
LYMPHOCYTES NFR BLD AUTO: 6.7 % (ref 20.5–51.5)
MAGNESIUM SERPL-MCNC: 2.1 MG/DL (ref 1.8–2.4)
MCH RBC QN AUTO: 29.9 UUG (ref 24.7–32.8)
MCHC RBC AUTO-ENTMCNC: 33 G/DL (ref 32.3–35.6)
MCV RBC AUTO: 92.3 FL (ref 75.5–95.3)
MONOCYTES # BLD AUTO: 0.4 K/UL (ref 2–10)
MONOCYTES NFR BLD AUTO: 3.2 % (ref 0–11)
NEUTROPHILS # BLD AUTO: 11.8 K/UL (ref 1.8–8.9)
NEUTROPHILS NFR BLD AUTO: 89.5 % (ref 38.5–71.5)
PCO2 TEMP ADJ BLDA: 35 MMHG (ref 35–45)
PEEP SETTING VENT: 450 ML
PH TEMP ADJ BLDA: 7.45 [PH] (ref 7.35–7.45)
PHOSPHATE SERPL-MCNC: 5.1 MG/DL (ref 2.5–4.9)
PLATELET # BLD AUTO: 327 K/UL (ref 179–408)
PO2 TEMP ADJ BLDA: 114.6 MMHG (ref 75–100)
POTASSIUM SERPL-SCNC: 3.8 MMOL/L (ref 3.5–5.1)
RBC # BLD AUTO: 2.49 MIL/UL (ref 3.63–4.92)
SET RATE, BG: 12
SPECIMEN DRAWN FROM PATIENT: (no result)
VENTILATION MODE VENT: (no result)
WBC # BLD AUTO: 13.2 K/UL (ref 3.8–11.8)

## 2020-02-29 RX ADMIN — LACTOBACILLUS ACIDOPH-L.BULGARICUS 1 MILLION CELL CHEWABLE TABLET SCH TAB.CHEW: at 16:16

## 2020-02-29 RX ADMIN — ATORVASTATIN CALCIUM SCH MG: 20 TABLET, FILM COATED ORAL at 20:03

## 2020-02-29 RX ADMIN — FOLIC ACID SCH MG: 1 TABLET ORAL at 08:46

## 2020-02-29 RX ADMIN — VITAMIN D, TAB 1000IU (100/BT) SCH UNIT: 25 TAB at 08:46

## 2020-02-29 RX ADMIN — DEXTROSE SCH MLS/HR: 50 INJECTION, SOLUTION INTRAVENOUS at 19:58

## 2020-02-29 RX ADMIN — METRONIDAZOLE SCH MLS/HR: 500 SOLUTION INTRAVENOUS at 22:06

## 2020-02-29 RX ADMIN — VANCOMYCIN HYDROCHLORIDE SCH MG: 500 INJECTION, POWDER, LYOPHILIZED, FOR SOLUTION INTRAVENOUS at 01:37

## 2020-02-29 RX ADMIN — VANCOMYCIN HYDROCHLORIDE SCH MG: 500 INJECTION, POWDER, LYOPHILIZED, FOR SOLUTION INTRAVENOUS at 08:46

## 2020-02-29 RX ADMIN — CYANOCOBALAMIN TAB 1000 MCG SCH MCG: 1000 TAB at 08:46

## 2020-02-29 RX ADMIN — ACETAMINOPHEN PRN MG: 160 SOLUTION ORAL at 16:16

## 2020-02-29 RX ADMIN — Medication SCH ML: at 08:45

## 2020-02-29 RX ADMIN — LACTOBACILLUS ACIDOPH-L.BULGARICUS 1 MILLION CELL CHEWABLE TABLET SCH TAB.CHEW: at 08:46

## 2020-02-29 RX ADMIN — SENNOSIDES SCH TAB: 8.6 TABLET, COATED ORAL at 20:03

## 2020-02-29 RX ADMIN — METRONIDAZOLE SCH MLS/HR: 500 SOLUTION INTRAVENOUS at 13:52

## 2020-02-29 RX ADMIN — METRONIDAZOLE SCH MLS/HR: 500 SOLUTION INTRAVENOUS at 05:46

## 2020-02-29 RX ADMIN — PANTOPRAZOLE SODIUM SCH MG: 40 GRANULE, DELAYED RELEASE ORAL at 08:47

## 2020-02-29 RX ADMIN — VANCOMYCIN HYDROCHLORIDE SCH MG: 500 INJECTION, POWDER, LYOPHILIZED, FOR SOLUTION INTRAVENOUS at 19:56

## 2020-02-29 RX ADMIN — SODIUM CHLORIDE PRN MLS/HR: 0.9 INJECTION, SOLUTION INTRAVENOUS at 22:20

## 2020-02-29 RX ADMIN — LEVETIRACETAM SCH MG: 500 TABLET, FILM COATED ORAL at 08:46

## 2020-02-29 RX ADMIN — LEVETIRACETAM SCH MG: 500 TABLET, FILM COATED ORAL at 20:03

## 2020-02-29 RX ADMIN — VANCOMYCIN HYDROCHLORIDE SCH MG: 500 INJECTION, POWDER, LYOPHILIZED, FOR SOLUTION INTRAVENOUS at 13:52

## 2020-02-29 RX ADMIN — VALACYCLOVIR HYDROCHLORIDE SCH MG: 500 TABLET, FILM COATED ORAL at 08:48

## 2020-02-29 RX ADMIN — BACITRACIN, NEOMYCIN, POLYMYXIN B SCH APP: 400; 3.5; 5 OINTMENT TOPICAL at 08:50

## 2020-02-29 NOTE — NUR
NEPHROLOGY SERVICES DR GODINEZ IN THE UNIT, FULL REPORT GIVEN TO DR GODINEZ SEE ORDER 
HISTORY FOR NEW ORDERS. DR GODINEZ AT BEDSIDE ASSESSING PATIENT.

## 2020-02-29 NOTE — NUR
NEUROLOGY SERVICES DR YOST INT HE UNIT, FULL REPORT GIVEN, SEE ORDER HISTORY FOR NEW 
ORDERS. DR GONZALEZ AT BEDSIDE ASSESSING PATIENT.

## 2020-02-29 NOTE — NUR
PT ON CONT OBREGON VENT WITH 7.0 ET/TUBE IN PLACE AND SECURED, WITH ANCHOR FAST , MOVE SIDE TO 
SIDE Q2 HOURS, BLOODY LIP AND MOUTH SUCTION GENTLY MOUTH WITH MAYNOR VALENCIA, VERY 
LITTLE RESPONSE, PT DOES ASSIST AT TIMES, NO VENT CHANGES MADE, SETTINGS,A/C 12, VT 450ML, 
PEEP5, 30%.ISIS LAWRENCEP

-------------------------------------------------------------------------------

Addendum: 02/29/20 at 0252 by BLAKE NASSAR RT

-------------------------------------------------------------------------------

Amended: Links added.

## 2020-02-29 NOTE — NUR
CARDIOLOGY SERVICES DR. LEAL IN THE UNIT, FULL REPORT GIVEN TO DR. LEAL SEE 
ORDER HISTORY FOR NEW ORDERS. DR LEAL AT BEDSIDE ASSESSING PATIENT.

## 2020-02-29 NOTE — NUR
PULMONARY SERVICES DR. RODRÍGUEZ IN THE UNIT. FULL REPORT GIVEN SEE ORDER HISTORY FOR NEW 
ORDERS. DR. RODRÍGUEZ AT THE BEDSIDE ASSESSING PATIENT.

## 2020-02-29 NOTE — NUR
INITIATED BLOOD TRANSFUSION. NO ACUTE DISTRESS WITHIN 15 MINUTED OF TRANSFUSION. NO S/S OF 
REACTION, PATIENT TOLERATING THE TRANSFUSION. WILL CONTINUE TO MONITOR.

## 2020-02-29 NOTE — NUR
* Maintains vital signs WNL

* Maintains urine output > 30 ml/hr

* Evidences normal skin turgor

* Maintains optimal lab values

* Maintains urine specific gravity WNL

-------------------------------------------------------------------------------

Addendum: 02/29/20 at 0001 by ONI SCHMITT RN

-------------------------------------------------------------------------------

Amended: Links added.

## 2020-02-29 NOTE — NUR
ID SERVICES WILLIMA HAWKINS IN THE UNIT, FULL REPORT GIVEN SEE ORDER HISTORY FOR NEW ORDERS. WILLIAM HAWKINS AT BEDSIDE ASSESSING PATIENT.

## 2020-03-01 VITALS — DIASTOLIC BLOOD PRESSURE: 76 MMHG | SYSTOLIC BLOOD PRESSURE: 144 MMHG

## 2020-03-01 VITALS — DIASTOLIC BLOOD PRESSURE: 83 MMHG | SYSTOLIC BLOOD PRESSURE: 145 MMHG

## 2020-03-01 VITALS — SYSTOLIC BLOOD PRESSURE: 154 MMHG | DIASTOLIC BLOOD PRESSURE: 84 MMHG

## 2020-03-01 VITALS — SYSTOLIC BLOOD PRESSURE: 141 MMHG | DIASTOLIC BLOOD PRESSURE: 76 MMHG

## 2020-03-01 VITALS — DIASTOLIC BLOOD PRESSURE: 85 MMHG | SYSTOLIC BLOOD PRESSURE: 152 MMHG

## 2020-03-01 VITALS — SYSTOLIC BLOOD PRESSURE: 102 MMHG | DIASTOLIC BLOOD PRESSURE: 85 MMHG

## 2020-03-01 VITALS — DIASTOLIC BLOOD PRESSURE: 70 MMHG | SYSTOLIC BLOOD PRESSURE: 130 MMHG

## 2020-03-01 VITALS — SYSTOLIC BLOOD PRESSURE: 149 MMHG | DIASTOLIC BLOOD PRESSURE: 81 MMHG

## 2020-03-01 VITALS — SYSTOLIC BLOOD PRESSURE: 150 MMHG | DIASTOLIC BLOOD PRESSURE: 86 MMHG

## 2020-03-01 VITALS — DIASTOLIC BLOOD PRESSURE: 90 MMHG | SYSTOLIC BLOOD PRESSURE: 148 MMHG

## 2020-03-01 VITALS — SYSTOLIC BLOOD PRESSURE: 146 MMHG | DIASTOLIC BLOOD PRESSURE: 83 MMHG

## 2020-03-01 VITALS — SYSTOLIC BLOOD PRESSURE: 137 MMHG | DIASTOLIC BLOOD PRESSURE: 85 MMHG

## 2020-03-01 VITALS — DIASTOLIC BLOOD PRESSURE: 81 MMHG | SYSTOLIC BLOOD PRESSURE: 145 MMHG

## 2020-03-01 VITALS — SYSTOLIC BLOOD PRESSURE: 148 MMHG | DIASTOLIC BLOOD PRESSURE: 84 MMHG

## 2020-03-01 VITALS — DIASTOLIC BLOOD PRESSURE: 72 MMHG | SYSTOLIC BLOOD PRESSURE: 121 MMHG

## 2020-03-01 VITALS — DIASTOLIC BLOOD PRESSURE: 86 MMHG | SYSTOLIC BLOOD PRESSURE: 147 MMHG

## 2020-03-01 VITALS — SYSTOLIC BLOOD PRESSURE: 146 MMHG | DIASTOLIC BLOOD PRESSURE: 87 MMHG

## 2020-03-01 VITALS — SYSTOLIC BLOOD PRESSURE: 126 MMHG | DIASTOLIC BLOOD PRESSURE: 68 MMHG

## 2020-03-01 VITALS — SYSTOLIC BLOOD PRESSURE: 143 MMHG | DIASTOLIC BLOOD PRESSURE: 88 MMHG

## 2020-03-01 VITALS — SYSTOLIC BLOOD PRESSURE: 146 MMHG | DIASTOLIC BLOOD PRESSURE: 81 MMHG

## 2020-03-01 VITALS — SYSTOLIC BLOOD PRESSURE: 121 MMHG | DIASTOLIC BLOOD PRESSURE: 73 MMHG

## 2020-03-01 LAB
ALP SERPL-CCNC: 76 U/L (ref 50–136)
ALT SERPL W/O P-5'-P-CCNC: 19 U/L (ref 14–59)
AST SERPL-CCNC: 32 U/L (ref 15–37)
BASE EXCESS BLDA CALC-SCNC: 1.4 MMOL/L
BASOPHILS # BLD AUTO: 0 K/UL (ref 0–8)
BASOPHILS NFR BLD AUTO: 0.3 % (ref 0–2)
BILIRUB SERPL-MCNC: 0.4 MG/DL (ref 0.2–1)
BUN SERPL-MCNC: 75 MG/DL (ref 7–18)
CHLORIDE SERPL-SCNC: 109 MMOL/L (ref 98–107)
CO2 SERPL-SCNC: 28 MMOL/L (ref 21–32)
CREAT SERPL-MCNC: 1.6 MG/DL (ref 0.6–1.3)
EOSINOPHIL # BLD AUTO: 0 K/UL (ref 0–0.7)
EOSINOPHIL NFR BLD AUTO: 0.3 % (ref 0–7)
GLUCOSE SERPL-MCNC: 123 MG/DL (ref 74–106)
HCO3 BLDA-SCNC: 24.5 MMOL/L
HCT VFR BLD AUTO: 27.4 % (ref 31.2–41.9)
HGB BLD-MCNC: 9.5 G/DL (ref 10.9–14.3)
HGB BLDA OXIMETRY-MCNC: 9.9 G/DL (ref 12–16)
INHALED O2 CONCENTRATION: 30 %
INTRINSIC PEEP RESPIRATORY: 5 CMH20
LYMPHOCYTES # BLD AUTO: 0.5 K/UL (ref 20–40)
LYMPHOCYTES NFR BLD AUTO: 5 % (ref 20.5–51.5)
MAGNESIUM SERPL-MCNC: 1.9 MG/DL (ref 1.8–2.4)
MCH RBC QN AUTO: 30.8 UUG (ref 24.7–32.8)
MCHC RBC AUTO-ENTMCNC: 35 G/DL (ref 32.3–35.6)
MCV RBC AUTO: 88.6 FL (ref 75.5–95.3)
MONOCYTES # BLD AUTO: 0.4 K/UL (ref 2–10)
MONOCYTES NFR BLD AUTO: 3.9 % (ref 0–11)
NEUTROPHILS # BLD AUTO: 9.9 K/UL (ref 1.8–8.9)
NEUTROPHILS NFR BLD AUTO: 90.5 % (ref 38.5–71.5)
PCO2 TEMP ADJ BLDA: 33.4 MMHG (ref 35–45)
PEEP SETTING VENT: 450 ML
PH TEMP ADJ BLDA: 7.48 [PH] (ref 7.35–7.45)
PHOSPHATE SERPL-MCNC: 4.7 MG/DL (ref 2.5–4.9)
PLATELET # BLD AUTO: 366 K/UL (ref 179–408)
PO2 TEMP ADJ BLDA: 110.4 MMHG (ref 75–100)
POTASSIUM SERPL-SCNC: 3.8 MMOL/L (ref 3.5–5.1)
RBC # BLD AUTO: 3.09 MIL/UL (ref 3.63–4.92)
SET RATE, BG: 12
SPECIMEN DRAWN FROM PATIENT: (no result)
VENTILATION MODE VENT: (no result)
WBC # BLD AUTO: 10.9 K/UL (ref 3.8–11.8)
WS STN SPEC: 7.4 G/DL (ref 6.4–8.2)

## 2020-03-01 RX ADMIN — LEVETIRACETAM SCH MG: 500 TABLET, FILM COATED ORAL at 20:55

## 2020-03-01 RX ADMIN — SENNOSIDES SCH TAB: 8.6 TABLET, COATED ORAL at 20:55

## 2020-03-01 RX ADMIN — VALACYCLOVIR HYDROCHLORIDE SCH MG: 500 TABLET, FILM COATED ORAL at 08:29

## 2020-03-01 RX ADMIN — METRONIDAZOLE SCH MLS/HR: 500 SOLUTION INTRAVENOUS at 05:55

## 2020-03-01 RX ADMIN — VITAMIN D, TAB 1000IU (100/BT) SCH UNIT: 25 TAB at 08:30

## 2020-03-01 RX ADMIN — Medication SCH ML: at 08:29

## 2020-03-01 RX ADMIN — LACTOBACILLUS ACIDOPH-L.BULGARICUS 1 MILLION CELL CHEWABLE TABLET SCH TAB.CHEW: at 17:06

## 2020-03-01 RX ADMIN — BACITRACIN, NEOMYCIN, POLYMYXIN B SCH APP: 400; 3.5; 5 OINTMENT TOPICAL at 08:30

## 2020-03-01 RX ADMIN — METRONIDAZOLE SCH MLS/HR: 500 SOLUTION INTRAVENOUS at 22:09

## 2020-03-01 RX ADMIN — VANCOMYCIN HYDROCHLORIDE SCH MG: 500 INJECTION, POWDER, LYOPHILIZED, FOR SOLUTION INTRAVENOUS at 08:28

## 2020-03-01 RX ADMIN — FOLIC ACID SCH MG: 1 TABLET ORAL at 08:29

## 2020-03-01 RX ADMIN — LEVETIRACETAM SCH MG: 500 TABLET, FILM COATED ORAL at 08:29

## 2020-03-01 RX ADMIN — VANCOMYCIN HYDROCHLORIDE SCH MG: 500 INJECTION, POWDER, LYOPHILIZED, FOR SOLUTION INTRAVENOUS at 02:00

## 2020-03-01 RX ADMIN — CYANOCOBALAMIN TAB 1000 MCG SCH MCG: 1000 TAB at 08:29

## 2020-03-01 RX ADMIN — PANTOPRAZOLE SODIUM SCH MG: 40 GRANULE, DELAYED RELEASE ORAL at 08:29

## 2020-03-01 RX ADMIN — Medication PRN ML: at 14:02

## 2020-03-01 RX ADMIN — METRONIDAZOLE SCH MLS/HR: 500 SOLUTION INTRAVENOUS at 14:02

## 2020-03-01 RX ADMIN — LACTOBACILLUS ACIDOPH-L.BULGARICUS 1 MILLION CELL CHEWABLE TABLET SCH TAB.CHEW: at 08:29

## 2020-03-01 RX ADMIN — ATORVASTATIN CALCIUM SCH MG: 20 TABLET, FILM COATED ORAL at 20:55

## 2020-03-01 NOTE — NUR
NEPHROLOGY SERVICES DR. FOX IN THE UNIT, FULL REPORT GIVEN SEE ORDER HISTORY FOR NEW 
ORDERS. DR FOX AT BEDSIDE ASSESSING PATIENT.

## 2020-03-01 NOTE — NUR
ID SERVICES SHRUTHI NP IN THE UNIT, FULL REPORT GIVEN SEE ORDER HISTORY FOR FOR NEW 
ORDERS. SHRUTHI NP AT BEDSIDE ASSESSING PATIENT.

## 2020-03-01 NOTE — NUR
CARDIOLOGY SERVICES DR LEAL IN THE UNIT. FULL REPORT GIVEN SEE ORDER HISTORY FOR NEW 
ORDERS. DR LEAL AT BEDSIDE ASSESSING PATIENT.

## 2020-03-01 NOTE — NUR
PULMONARY SERVICES DR RODRÍGUEZ IN THE UNIT,FULL REPORT GIVEN SEE ORDER HISTORY FOR NEW 
ORDERS. DR. RODRÍGUEZ AT BEDSIDE ASSESSING PATIENT.

## 2020-03-01 NOTE — NUR
FLEXISEAL REINSERTED, PT. HAD CONT. LIQUIDISH STOOL LARGE AMT. BED BATH GIVEN. REPOSITIONED 
ON HER SIDE W/ HOB ELEVATED. RECHECKED RESIDUAL OBTAINED 100 CC.CONT TO HOLD FDG.

## 2020-03-01 NOTE — NUR
PT RECEIVED ON OBREGON VENT WITH ETT SECURED AND AIRWAY PATENT. VENT SETTINGS 

AC12//30%/+5 PEEP.PT TOLERATING CURRENT VENT SETTINGS FINE WITH NO DISTRESS.  ROUTINE 
ABG DONE. RESULTS NON CRITICAL. ORAL CARE DONE. WILL CONTINUE TO MONITOR

## 2020-03-01 NOTE — NUR
PT ON CONT OBREGON VENT WITH 7.0 ET/TUBE IN PLACE AND SECURED, WITH ANCHOR FAST, SLIGHT BLOODY 
MOUTH LIP, CLEAN, CHANGE HME AND CUELLAR, NO VENT CHANGES MADE AT THIS TIME, ALL ALARMS 
GOOD, NO VENT CHANGES MADE, AMBU BAG AT BEDSIDE.ISIS LAWRENCEP

-------------------------------------------------------------------------------

Addendum: 03/01/20 at 0447 by BLAKE NASSAR RT

-------------------------------------------------------------------------------

Amended: Links added.

## 2020-03-01 NOTE — NUR
RECEIVED PT RESPONDING TO PAINFUL STIMULI. ORALLY INTUBATED TO VENT W/ SETTINGS OF 
AC-12.TV-450. FIO2-30%, PEEP-+5 W/ O2 SAT %. NGT ON R NARE CHECKED PLACEMENT, CHECKED 
RESIDUAL 150CC NOTED HOLD FDG . PICC LINE ON LUCRETIA INTACT & PATENT. NS @ TKO RATE FOR IVPB. 
AFEBRILE. REPOSITIONED W/ HOB ELEVATED.

## 2020-03-02 VITALS — SYSTOLIC BLOOD PRESSURE: 132 MMHG | DIASTOLIC BLOOD PRESSURE: 82 MMHG

## 2020-03-02 VITALS — DIASTOLIC BLOOD PRESSURE: 91 MMHG | SYSTOLIC BLOOD PRESSURE: 151 MMHG

## 2020-03-02 VITALS — DIASTOLIC BLOOD PRESSURE: 89 MMHG | SYSTOLIC BLOOD PRESSURE: 143 MMHG

## 2020-03-02 VITALS — DIASTOLIC BLOOD PRESSURE: 86 MMHG | SYSTOLIC BLOOD PRESSURE: 143 MMHG

## 2020-03-02 VITALS — SYSTOLIC BLOOD PRESSURE: 144 MMHG | DIASTOLIC BLOOD PRESSURE: 87 MMHG

## 2020-03-02 VITALS — SYSTOLIC BLOOD PRESSURE: 153 MMHG | DIASTOLIC BLOOD PRESSURE: 89 MMHG

## 2020-03-02 VITALS — SYSTOLIC BLOOD PRESSURE: 144 MMHG | DIASTOLIC BLOOD PRESSURE: 84 MMHG

## 2020-03-02 VITALS — SYSTOLIC BLOOD PRESSURE: 147 MMHG | DIASTOLIC BLOOD PRESSURE: 89 MMHG

## 2020-03-02 VITALS — SYSTOLIC BLOOD PRESSURE: 143 MMHG | DIASTOLIC BLOOD PRESSURE: 84 MMHG

## 2020-03-02 VITALS — DIASTOLIC BLOOD PRESSURE: 86 MMHG | SYSTOLIC BLOOD PRESSURE: 145 MMHG

## 2020-03-02 VITALS — SYSTOLIC BLOOD PRESSURE: 143 MMHG | DIASTOLIC BLOOD PRESSURE: 86 MMHG

## 2020-03-02 VITALS — SYSTOLIC BLOOD PRESSURE: 146 MMHG | DIASTOLIC BLOOD PRESSURE: 91 MMHG

## 2020-03-02 VITALS — DIASTOLIC BLOOD PRESSURE: 87 MMHG | SYSTOLIC BLOOD PRESSURE: 145 MMHG

## 2020-03-02 VITALS — DIASTOLIC BLOOD PRESSURE: 91 MMHG | SYSTOLIC BLOOD PRESSURE: 144 MMHG

## 2020-03-02 VITALS — DIASTOLIC BLOOD PRESSURE: 85 MMHG | SYSTOLIC BLOOD PRESSURE: 149 MMHG

## 2020-03-02 VITALS — SYSTOLIC BLOOD PRESSURE: 149 MMHG | DIASTOLIC BLOOD PRESSURE: 86 MMHG

## 2020-03-02 VITALS — DIASTOLIC BLOOD PRESSURE: 90 MMHG | SYSTOLIC BLOOD PRESSURE: 147 MMHG

## 2020-03-02 VITALS — SYSTOLIC BLOOD PRESSURE: 142 MMHG | DIASTOLIC BLOOD PRESSURE: 85 MMHG

## 2020-03-02 VITALS — DIASTOLIC BLOOD PRESSURE: 86 MMHG | SYSTOLIC BLOOD PRESSURE: 147 MMHG

## 2020-03-02 VITALS — DIASTOLIC BLOOD PRESSURE: 86 MMHG | SYSTOLIC BLOOD PRESSURE: 150 MMHG

## 2020-03-02 VITALS — DIASTOLIC BLOOD PRESSURE: 88 MMHG | SYSTOLIC BLOOD PRESSURE: 147 MMHG

## 2020-03-02 VITALS — SYSTOLIC BLOOD PRESSURE: 147 MMHG | DIASTOLIC BLOOD PRESSURE: 90 MMHG

## 2020-03-02 LAB
APPEARANCE UR: CLEAR
BASOPHILS # BLD AUTO: 0 K/UL (ref 0–8)
BASOPHILS NFR BLD AUTO: 0.3 % (ref 0–2)
BILIRUB UR QL STRIP: NEGATIVE
BUN SERPL-MCNC: 75 MG/DL (ref 7–18)
CHLORIDE SERPL-SCNC: 106 MMOL/L (ref 98–107)
CO2 SERPL-SCNC: 29 MMOL/L (ref 21–32)
COLOR UR: YELLOW
CREAT SERPL-MCNC: 1.5 MG/DL (ref 0.6–1.3)
DEPRECATED SQUAMOUS URNS QL MICRO: (no result) /HPF
EOSINOPHIL # BLD AUTO: 0 K/UL (ref 0–0.7)
EOSINOPHIL NFR BLD AUTO: 0.4 % (ref 0–7)
GLUCOSE SERPL-MCNC: 116 MG/DL (ref 74–106)
GLUCOSE UR STRIP-MCNC: NEGATIVE MG/DL
HCT VFR BLD AUTO: 27.9 % (ref 31.2–41.9)
HGB BLD-MCNC: 9.3 G/DL (ref 10.9–14.3)
HGB UR QL STRIP: (no result)
KETONES UR STRIP-MCNC: NEGATIVE MG/DL
LEUKOCYTE ESTERASE UR QL STRIP: (no result)
LYMPHOCYTES # BLD AUTO: 0.7 K/UL (ref 20–40)
LYMPHOCYTES NFR BLD AUTO: 7.5 % (ref 20.5–51.5)
MAGNESIUM SERPL-MCNC: 1.9 MG/DL (ref 1.8–2.4)
MCH RBC QN AUTO: 29.9 UUG (ref 24.7–32.8)
MCHC RBC AUTO-ENTMCNC: 33 G/DL (ref 32.3–35.6)
MCV RBC AUTO: 89.6 FL (ref 75.5–95.3)
MONOCYTES # BLD AUTO: 0.4 K/UL (ref 2–10)
MONOCYTES NFR BLD AUTO: 4.8 % (ref 0–11)
NEUTROPHILS # BLD AUTO: 8.1 K/UL (ref 1.8–8.9)
NEUTROPHILS NFR BLD AUTO: 87 % (ref 38.5–71.5)
NITRITE UR QL STRIP: NEGATIVE
PH UR STRIP: 5.5 [PH] (ref 5–8)
PHOSPHATE SERPL-MCNC: 4.5 MG/DL (ref 2.5–4.9)
PLATELET # BLD AUTO: 419 K/UL (ref 179–408)
POTASSIUM SERPL-SCNC: 3.7 MMOL/L (ref 3.5–5.1)
RBC # BLD AUTO: 3.12 MIL/UL (ref 3.63–4.92)
RBC #/AREA URNS HPF: (no result) /HPF (ref 0–3)
SP GR UR STRIP: 1.01 (ref 1–1.03)
UROBILINOGEN UR STRIP-MCNC: 0.2 E.U./DL
WBC # BLD AUTO: 9.3 K/UL (ref 3.8–11.8)
WBC #/AREA URNS HPF: (no result) /HPF (ref 0–3)
WBC #/AREA URNS HPF: 1 /HPF
YEAST URNS QL MICRO: (no result) /HPF

## 2020-03-02 RX ADMIN — CYANOCOBALAMIN TAB 1000 MCG SCH MCG: 1000 TAB at 08:42

## 2020-03-02 RX ADMIN — ATORVASTATIN CALCIUM SCH MG: 20 TABLET, FILM COATED ORAL at 20:31

## 2020-03-02 RX ADMIN — LACTOBACILLUS ACIDOPH-L.BULGARICUS 1 MILLION CELL CHEWABLE TABLET SCH TAB.CHEW: at 08:41

## 2020-03-02 RX ADMIN — Medication PRN ML: at 20:21

## 2020-03-02 RX ADMIN — FOLIC ACID SCH MG: 1 TABLET ORAL at 08:41

## 2020-03-02 RX ADMIN — Medication SCH ML: at 08:40

## 2020-03-02 RX ADMIN — METRONIDAZOLE SCH MLS/HR: 500 SOLUTION INTRAVENOUS at 06:07

## 2020-03-02 RX ADMIN — VALACYCLOVIR HYDROCHLORIDE SCH MG: 500 TABLET, FILM COATED ORAL at 08:41

## 2020-03-02 RX ADMIN — BACITRACIN, NEOMYCIN, POLYMYXIN B SCH APP: 400; 3.5; 5 OINTMENT TOPICAL at 09:03

## 2020-03-02 RX ADMIN — SODIUM CHLORIDE PRN MLS/HR: 0.9 INJECTION, SOLUTION INTRAVENOUS at 06:08

## 2020-03-02 RX ADMIN — LEVETIRACETAM SCH MG: 500 TABLET, FILM COATED ORAL at 20:31

## 2020-03-02 RX ADMIN — ACETAMINOPHEN PRN MG: 160 SOLUTION ORAL at 06:05

## 2020-03-02 RX ADMIN — VANCOMYCIN HYDROCHLORIDE SCH MG: 500 INJECTION, POWDER, LYOPHILIZED, FOR SOLUTION INTRAVENOUS at 17:56

## 2020-03-02 RX ADMIN — METRONIDAZOLE SCH MLS/HR: 500 SOLUTION INTRAVENOUS at 21:25

## 2020-03-02 RX ADMIN — SENNOSIDES SCH TAB: 8.6 TABLET, COATED ORAL at 20:32

## 2020-03-02 RX ADMIN — PANTOPRAZOLE SODIUM SCH MG: 40 GRANULE, DELAYED RELEASE ORAL at 08:41

## 2020-03-02 RX ADMIN — METRONIDAZOLE SCH MLS/HR: 500 SOLUTION INTRAVENOUS at 15:04

## 2020-03-02 RX ADMIN — LEVETIRACETAM SCH MG: 500 TABLET, FILM COATED ORAL at 08:41

## 2020-03-02 RX ADMIN — VANCOMYCIN HYDROCHLORIDE SCH MG: 500 INJECTION, POWDER, LYOPHILIZED, FOR SOLUTION INTRAVENOUS at 15:09

## 2020-03-02 RX ADMIN — ANORECTAL OINTMENT PRN APPLIC: 15.7; .44; 24; 20.6 OINTMENT TOPICAL at 06:07

## 2020-03-02 RX ADMIN — VITAMIN D, TAB 1000IU (100/BT) SCH UNIT: 25 TAB at 08:42

## 2020-03-02 RX ADMIN — LACTOBACILLUS ACIDOPH-L.BULGARICUS 1 MILLION CELL CHEWABLE TABLET SCH TAB.CHEW: at 17:55

## 2020-03-02 NOTE — NUR
PT ON CONT OBREGON VENT  7.0 ET/TUBE IN PLACE; WITH ANCHOR FAST IN PLACE, STILL SOME BLOOD ON 
LIP. SUCTION, NO VENT CHANGES MADE, SETTINGS, A/C12, VT 450ML, PEEP5, 30%, PT DOES ASSIST , 
ALL ALARMS  GOOD, AMBU BAG AT BEDSIDE.ISIS NASSAR RCP

-------------------------------------------------------------------------------

Addendum: 03/02/20 at 0352 by BLAKE NASSAR RT

-------------------------------------------------------------------------------

Amended: Links added.

## 2020-03-02 NOTE — NUR
PT ON CONT OBREGON VENT  7.0 ET/TUBE IN PLACE APROX 22CM AT THE LIP; WITH ANCHOR FAST IN 
PLACE.SUCTION FOR SMALL AMOUNT OF THIN PALE YELLOW. NO VENT CHANGES MADE, SETTINGS, A/C12, 
, PEEP5, 30%, ALARMS NO AND AUDIBLE, AMBU BAG AT BEDSIDE. NO SOB NOTED

## 2020-03-02 NOTE — NUR
DOCTOR ANNE IN THE UNIT GIVEN FULL REPORT. FOLLOW UP WITH ID PER ANNE REGARDING FEVER 
 THIS MORNING.

## 2020-03-02 NOTE — NUR
AM CARE DONE. ORAL CARE DONE. TRIPLE ANTIBIOTIC MOISTENED THE MOUTH. REPOSITIONED W/ HOB 
ELEVATED. OFF NGT FDG INCREASED RESIDUAL.

## 2020-03-02 NOTE — NUR
RECEIVED PT. OPENS HER EYES TO NOXIOUS STIMULI. ORALLY INTUBATED TO VENT W/ SETTINGS OF 
AC-12,TV-450, FIO2-30%, PEEP-+5 W/ O2 SAT OF 99%. NGT ON R NARE , CHECKED PLACEMENT, CHECKED 
RESIDUAL 20CC NOTED. PICC LINE ON LUCRETIA NS @ TKO RATE FOR IVPB.FLEXISEAL TO GRAVITY BAG W/ 
LIQUIDISH GRAYISH STOOL.REPOSITIONED ON HER SIDE W/ HOB ELEVATED.

## 2020-03-02 NOTE — NUR
DOCTOR ELVIS IN THE UNIT TO SEE PATIENT. INFORMED STILL PENDING TARZANA TRANSFER FOR 
TRACH & PEG PLACEMENT.

## 2020-03-02 NOTE — NUR
ABHILASH IN THE UNIT TO SEE PATIENT. INFORMED OF TEMPERATURE IN AM. PAN CULTURES ORDERED AND 
SENT. PLEASE REFER TO OTHER NEW ORDERS ON SYSTEM.

## 2020-03-03 VITALS — SYSTOLIC BLOOD PRESSURE: 138 MMHG | DIASTOLIC BLOOD PRESSURE: 82 MMHG

## 2020-03-03 VITALS — SYSTOLIC BLOOD PRESSURE: 133 MMHG | DIASTOLIC BLOOD PRESSURE: 79 MMHG

## 2020-03-03 VITALS — DIASTOLIC BLOOD PRESSURE: 83 MMHG | SYSTOLIC BLOOD PRESSURE: 133 MMHG

## 2020-03-03 VITALS — SYSTOLIC BLOOD PRESSURE: 152 MMHG | DIASTOLIC BLOOD PRESSURE: 92 MMHG

## 2020-03-03 VITALS — SYSTOLIC BLOOD PRESSURE: 153 MMHG | DIASTOLIC BLOOD PRESSURE: 93 MMHG

## 2020-03-03 VITALS — DIASTOLIC BLOOD PRESSURE: 93 MMHG | SYSTOLIC BLOOD PRESSURE: 153 MMHG

## 2020-03-03 VITALS — SYSTOLIC BLOOD PRESSURE: 151 MMHG | DIASTOLIC BLOOD PRESSURE: 97 MMHG

## 2020-03-03 VITALS — DIASTOLIC BLOOD PRESSURE: 80 MMHG | SYSTOLIC BLOOD PRESSURE: 134 MMHG

## 2020-03-03 VITALS — SYSTOLIC BLOOD PRESSURE: 161 MMHG | DIASTOLIC BLOOD PRESSURE: 106 MMHG

## 2020-03-03 VITALS — SYSTOLIC BLOOD PRESSURE: 151 MMHG | DIASTOLIC BLOOD PRESSURE: 93 MMHG

## 2020-03-03 VITALS — SYSTOLIC BLOOD PRESSURE: 137 MMHG | DIASTOLIC BLOOD PRESSURE: 82 MMHG

## 2020-03-03 VITALS — SYSTOLIC BLOOD PRESSURE: 142 MMHG | DIASTOLIC BLOOD PRESSURE: 87 MMHG

## 2020-03-03 VITALS — SYSTOLIC BLOOD PRESSURE: 135 MMHG | DIASTOLIC BLOOD PRESSURE: 78 MMHG

## 2020-03-03 VITALS — SYSTOLIC BLOOD PRESSURE: 135 MMHG | DIASTOLIC BLOOD PRESSURE: 81 MMHG

## 2020-03-03 VITALS — SYSTOLIC BLOOD PRESSURE: 152 MMHG | DIASTOLIC BLOOD PRESSURE: 94 MMHG

## 2020-03-03 VITALS — SYSTOLIC BLOOD PRESSURE: 136 MMHG | DIASTOLIC BLOOD PRESSURE: 78 MMHG

## 2020-03-03 VITALS — SYSTOLIC BLOOD PRESSURE: 152 MMHG | DIASTOLIC BLOOD PRESSURE: 96 MMHG

## 2020-03-03 VITALS — DIASTOLIC BLOOD PRESSURE: 79 MMHG | SYSTOLIC BLOOD PRESSURE: 140 MMHG

## 2020-03-03 VITALS — DIASTOLIC BLOOD PRESSURE: 81 MMHG | SYSTOLIC BLOOD PRESSURE: 137 MMHG

## 2020-03-03 VITALS — DIASTOLIC BLOOD PRESSURE: 92 MMHG | SYSTOLIC BLOOD PRESSURE: 142 MMHG

## 2020-03-03 VITALS — SYSTOLIC BLOOD PRESSURE: 135 MMHG | DIASTOLIC BLOOD PRESSURE: 84 MMHG

## 2020-03-03 VITALS — DIASTOLIC BLOOD PRESSURE: 85 MMHG | SYSTOLIC BLOOD PRESSURE: 137 MMHG

## 2020-03-03 LAB
BASE EXCESS BLDA CALC-SCNC: 1.3 MMOL/L
BASOPHILS # BLD AUTO: 0 K/UL (ref 0–8)
BASOPHILS NFR BLD AUTO: 0.2 % (ref 0–2)
BUN SERPL-MCNC: 70 MG/DL (ref 7–18)
CHLORIDE SERPL-SCNC: 113 MMOL/L (ref 98–107)
CO2 SERPL-SCNC: 29 MMOL/L (ref 21–32)
CREAT SERPL-MCNC: 1.4 MG/DL (ref 0.6–1.3)
EOSINOPHIL # BLD AUTO: 0 K/UL (ref 0–0.7)
EOSINOPHIL NFR BLD AUTO: 0.5 % (ref 0–7)
GLUCOSE SERPL-MCNC: 131 MG/DL (ref 74–106)
HCO3 BLDA-SCNC: 25.2 MMOL/L
HCT VFR BLD AUTO: 27.5 % (ref 31.2–41.9)
HGB BLD-MCNC: 9.1 G/DL (ref 10.9–14.3)
HGB BLDA OXIMETRY-MCNC: 12.9 G/DL (ref 12–16)
INHALED O2 CONCENTRATION: 30 %
INTRINSIC PEEP RESPIRATORY: 5 CMH20
LYMPHOCYTES # BLD AUTO: 1.1 K/UL (ref 20–40)
LYMPHOCYTES NFR BLD AUTO: 11.1 % (ref 20.5–51.5)
MAGNESIUM SERPL-MCNC: 2.1 MG/DL (ref 1.8–2.4)
MCH RBC QN AUTO: 30.2 UUG (ref 24.7–32.8)
MCHC RBC AUTO-ENTMCNC: 33 G/DL (ref 32.3–35.6)
MCV RBC AUTO: 90.7 FL (ref 75.5–95.3)
MONOCYTES # BLD AUTO: 0.5 K/UL (ref 2–10)
MONOCYTES NFR BLD AUTO: 5.8 % (ref 0–11)
NEUTROPHILS # BLD AUTO: 7.8 K/UL (ref 1.8–8.9)
NEUTROPHILS NFR BLD AUTO: 82.4 % (ref 38.5–71.5)
PCO2 TEMP ADJ BLDA: 37.5 MMHG (ref 35–45)
PEEP SETTING VENT: 450 ML
PH TEMP ADJ BLDA: 7.45 [PH] (ref 7.35–7.45)
PHOSPHATE SERPL-MCNC: 4.1 MG/DL (ref 2.5–4.9)
PLATELET # BLD AUTO: 447 K/UL (ref 179–408)
PO2 TEMP ADJ BLDA: 135.2 MMHG (ref 75–100)
POTASSIUM SERPL-SCNC: 3.9 MMOL/L (ref 3.5–5.1)
RBC # BLD AUTO: 3.03 MIL/UL (ref 3.63–4.92)
SET RATE, BG: 12
SPECIMEN DRAWN FROM PATIENT: (no result)
VENTILATION MODE VENT: (no result)
WBC # BLD AUTO: 9.5 K/UL (ref 3.8–11.8)

## 2020-03-03 RX ADMIN — VANCOMYCIN HYDROCHLORIDE SCH MG: 500 INJECTION, POWDER, LYOPHILIZED, FOR SOLUTION INTRAVENOUS at 23:29

## 2020-03-03 RX ADMIN — LACTOBACILLUS ACIDOPH-L.BULGARICUS 1 MILLION CELL CHEWABLE TABLET SCH TAB.CHEW: at 08:39

## 2020-03-03 RX ADMIN — PANTOPRAZOLE SODIUM SCH MG: 40 GRANULE, DELAYED RELEASE ORAL at 08:38

## 2020-03-03 RX ADMIN — VANCOMYCIN HYDROCHLORIDE SCH MG: 500 INJECTION, POWDER, LYOPHILIZED, FOR SOLUTION INTRAVENOUS at 18:35

## 2020-03-03 RX ADMIN — VANCOMYCIN HYDROCHLORIDE SCH MG: 500 INJECTION, POWDER, LYOPHILIZED, FOR SOLUTION INTRAVENOUS at 12:09

## 2020-03-03 RX ADMIN — FOLIC ACID SCH MG: 1 TABLET ORAL at 08:39

## 2020-03-03 RX ADMIN — LACTOBACILLUS ACIDOPH-L.BULGARICUS 1 MILLION CELL CHEWABLE TABLET SCH TAB.CHEW: at 18:35

## 2020-03-03 RX ADMIN — VITAMIN D, TAB 1000IU (100/BT) SCH UNIT: 25 TAB at 08:39

## 2020-03-03 RX ADMIN — Medication SCH ML: at 08:38

## 2020-03-03 RX ADMIN — SENNOSIDES SCH TAB: 8.6 TABLET, COATED ORAL at 20:56

## 2020-03-03 RX ADMIN — SODIUM CHLORIDE PRN MLS/HR: 0.9 INJECTION, SOLUTION INTRAVENOUS at 05:38

## 2020-03-03 RX ADMIN — VANCOMYCIN HYDROCHLORIDE SCH MG: 500 INJECTION, POWDER, LYOPHILIZED, FOR SOLUTION INTRAVENOUS at 05:35

## 2020-03-03 RX ADMIN — CYANOCOBALAMIN TAB 1000 MCG SCH MCG: 1000 TAB at 08:49

## 2020-03-03 RX ADMIN — METRONIDAZOLE SCH MLS/HR: 500 SOLUTION INTRAVENOUS at 14:14

## 2020-03-03 RX ADMIN — BACITRACIN, NEOMYCIN, POLYMYXIN B SCH APP: 400; 3.5; 5 OINTMENT TOPICAL at 08:50

## 2020-03-03 RX ADMIN — METRONIDAZOLE SCH MLS/HR: 500 SOLUTION INTRAVENOUS at 05:34

## 2020-03-03 RX ADMIN — LEVETIRACETAM SCH MG: 500 TABLET, FILM COATED ORAL at 08:39

## 2020-03-03 RX ADMIN — VANCOMYCIN HYDROCHLORIDE SCH MG: 500 INJECTION, POWDER, LYOPHILIZED, FOR SOLUTION INTRAVENOUS at 00:50

## 2020-03-03 RX ADMIN — LEVETIRACETAM SCH MG: 500 TABLET, FILM COATED ORAL at 20:56

## 2020-03-03 RX ADMIN — METRONIDAZOLE SCH MLS/HR: 500 SOLUTION INTRAVENOUS at 22:05

## 2020-03-03 RX ADMIN — ATORVASTATIN CALCIUM SCH MG: 20 TABLET, FILM COATED ORAL at 20:56

## 2020-03-03 NOTE — NUR
DOCTOR RONNIE IN THE UNIT INFORMED THAT PATIENT IS OPENING EYES MORE WITH LESS 
STIMULATION. UNABLE TO FOLLOW COMMANDS YET.

## 2020-03-03 NOTE — NUR
PATIENT WOUND IS BEING CLEANSED AN DPAT DRY  AND EMULSION DRESSING AND COVERED WITH NON 
ADHESIVE DRESSING

-------------------------------------------------------------------------------

Addendum: 03/03/20 at 2300 by ONI SCHMITT RN

-------------------------------------------------------------------------------

Amended: Links added.

## 2020-03-03 NOTE — NUR
* Exhibits usual skin color

Exhibits granulation/healing at site

* Exhibits decreased drainage at site

* Exhibits no s/s of infection

* Exhibits a  decrease in lesion size

* Maintains nutritional statuss

-------------------------------------------------------------------------------

Addendum: 03/03/20 at 2302 by ONI SCHMITT RN

-------------------------------------------------------------------------------

Amended: Links added.

## 2020-03-03 NOTE — NUR
INTAKE AN DOUTPUT BEING MONITORED , URINE OUTPUT AND GT FEEDING RESIDUAL

-------------------------------------------------------------------------------

Addendum: 03/03/20 at 2253 by ONI SCHMITT RN

-------------------------------------------------------------------------------

Amended: Links added.

-------------------------------------------------------------------------------

Addendum: 03/03/20 at 2255 by ONI SCHMITT RN

-------------------------------------------------------------------------------

Amended: Links added.

-------------------------------------------------------------------------------

Addendum: 03/03/20 at 2256 by ONI SCHMITT RN

-------------------------------------------------------------------------------

Amended: Links added.

## 2020-03-03 NOTE — NUR
Bernadette in the unit. obtaining temperature. patient is 100.8. Bernadette placed new orders and 
ordered for new [place] Placement.

## 2020-03-03 NOTE — NUR
PATIENT IS ON A VENTILATOR SETTING AC 12 ,  , P 5 AND FIO2 OF 30 % , ABG  IS BEING 
MONITORED AND PATIENT'S BREATHING 

-------------------------------------------------------------------------------

Addendum: 03/03/20 at 2258 by ONI SCHMITT RN

-------------------------------------------------------------------------------

Amended: Links added.

-------------------------------------------------------------------------------

Addendum: 03/03/20 at 2300 by ONI SCHMITT RN

-------------------------------------------------------------------------------

Amended: Links sarah.

## 2020-03-03 NOTE — NUR
received patient on vent settings of ac 12 , tv 450 , p 5 , fio2  30 % , opens eyes , unable 
to follow commnad , flexi , seal , kapoor and picc line laura intact , glucerna 1.2 ngt at 50 
ml hr , initial residual 60 ml

## 2020-03-03 NOTE — NUR
HEAD OF BED IS AT 30 DEGREES   , CHECKING OF RESIDUAL EVERY 2 HOURS

-------------------------------------------------------------------------------

Addendum: 03/03/20 at 2302 by ONI SCHMITT RN

-------------------------------------------------------------------------------

Amended: Links added.

-------------------------------------------------------------------------------

Addendum: 03/03/20 at 2302 by ONI SCHMITT RN

-------------------------------------------------------------------------------

Amended: Links added.

## 2020-03-03 NOTE — NUR
PATIENT MENTAL STATUS IS BEING MONITORED , THERS IS SPONTENEOUS EYE OPENING WHEN IN DEEP 
TOUCH NOW

-------------------------------------------------------------------------------

Addendum: 03/03/20 at 2256 by ONI SCHMITT RN

-------------------------------------------------------------------------------

Amended: Links added.

## 2020-03-03 NOTE — NUR
BODY TEMPP IS BEING MONITORED , AND MEDICATION TYLENOL LIQUID IS  STAND BY TO LOWER BODY 
TEMP

-------------------------------------------------------------------------------

Addendum: 03/03/20 at 2255 by ONI SCHMITT RN

-------------------------------------------------------------------------------

Amended: Links added.

-------------------------------------------------------------------------------

Addendum: 03/03/20 at 2256 by ONI SCHMITT RN

-------------------------------------------------------------------------------

Amended: Links added.

## 2020-03-03 NOTE — NUR
* Understands anatomy and physiology

* Describes reportable s/s

* Understands treatment plan*

Identifies current stressors

* Develops effective coping behaviors

-------------------------------------------------------------------------------

Addendum: 03/03/20 at 2300 by ONI SCHMITT RN

-------------------------------------------------------------------------------

Amended: Links added.

## 2020-03-04 VITALS — SYSTOLIC BLOOD PRESSURE: 128 MMHG | DIASTOLIC BLOOD PRESSURE: 70 MMHG

## 2020-03-04 VITALS — DIASTOLIC BLOOD PRESSURE: 91 MMHG | SYSTOLIC BLOOD PRESSURE: 146 MMHG

## 2020-03-04 VITALS — DIASTOLIC BLOOD PRESSURE: 79 MMHG | SYSTOLIC BLOOD PRESSURE: 139 MMHG

## 2020-03-04 VITALS — SYSTOLIC BLOOD PRESSURE: 136 MMHG | DIASTOLIC BLOOD PRESSURE: 85 MMHG

## 2020-03-04 VITALS — DIASTOLIC BLOOD PRESSURE: 79 MMHG | SYSTOLIC BLOOD PRESSURE: 131 MMHG

## 2020-03-04 VITALS — SYSTOLIC BLOOD PRESSURE: 134 MMHG | DIASTOLIC BLOOD PRESSURE: 79 MMHG

## 2020-03-04 VITALS — SYSTOLIC BLOOD PRESSURE: 136 MMHG | DIASTOLIC BLOOD PRESSURE: 81 MMHG

## 2020-03-04 VITALS — SYSTOLIC BLOOD PRESSURE: 143 MMHG | DIASTOLIC BLOOD PRESSURE: 85 MMHG

## 2020-03-04 VITALS — DIASTOLIC BLOOD PRESSURE: 75 MMHG | SYSTOLIC BLOOD PRESSURE: 135 MMHG

## 2020-03-04 VITALS — DIASTOLIC BLOOD PRESSURE: 71 MMHG | SYSTOLIC BLOOD PRESSURE: 132 MMHG

## 2020-03-04 VITALS — SYSTOLIC BLOOD PRESSURE: 147 MMHG | DIASTOLIC BLOOD PRESSURE: 90 MMHG

## 2020-03-04 VITALS — DIASTOLIC BLOOD PRESSURE: 84 MMHG | SYSTOLIC BLOOD PRESSURE: 144 MMHG

## 2020-03-04 VITALS — SYSTOLIC BLOOD PRESSURE: 151 MMHG | DIASTOLIC BLOOD PRESSURE: 94 MMHG

## 2020-03-04 VITALS — DIASTOLIC BLOOD PRESSURE: 79 MMHG | SYSTOLIC BLOOD PRESSURE: 141 MMHG

## 2020-03-04 VITALS — SYSTOLIC BLOOD PRESSURE: 139 MMHG | DIASTOLIC BLOOD PRESSURE: 82 MMHG

## 2020-03-04 VITALS — DIASTOLIC BLOOD PRESSURE: 82 MMHG | SYSTOLIC BLOOD PRESSURE: 139 MMHG

## 2020-03-04 VITALS — SYSTOLIC BLOOD PRESSURE: 134 MMHG | DIASTOLIC BLOOD PRESSURE: 80 MMHG

## 2020-03-04 VITALS — SYSTOLIC BLOOD PRESSURE: 137 MMHG | DIASTOLIC BLOOD PRESSURE: 84 MMHG

## 2020-03-04 VITALS — DIASTOLIC BLOOD PRESSURE: 86 MMHG | SYSTOLIC BLOOD PRESSURE: 140 MMHG

## 2020-03-04 VITALS — SYSTOLIC BLOOD PRESSURE: 131 MMHG | DIASTOLIC BLOOD PRESSURE: 69 MMHG

## 2020-03-04 VITALS — SYSTOLIC BLOOD PRESSURE: 135 MMHG | DIASTOLIC BLOOD PRESSURE: 84 MMHG

## 2020-03-04 VITALS — SYSTOLIC BLOOD PRESSURE: 134 MMHG | DIASTOLIC BLOOD PRESSURE: 78 MMHG

## 2020-03-04 VITALS — DIASTOLIC BLOOD PRESSURE: 82 MMHG | SYSTOLIC BLOOD PRESSURE: 140 MMHG

## 2020-03-04 LAB
BASOPHILS # BLD AUTO: 0 K/UL (ref 0–8)
BASOPHILS NFR BLD AUTO: 0.5 % (ref 0–2)
BUN SERPL-MCNC: 68 MG/DL (ref 7–18)
CHLORIDE SERPL-SCNC: 110 MMOL/L (ref 98–107)
CO2 SERPL-SCNC: 27 MMOL/L (ref 21–32)
CREAT SERPL-MCNC: 1.2 MG/DL (ref 0.6–1.3)
EOSINOPHIL # BLD AUTO: 0 K/UL (ref 0–0.7)
EOSINOPHIL NFR BLD AUTO: 0.2 % (ref 0–7)
GLUCOSE SERPL-MCNC: 204 MG/DL (ref 74–106)
HCT VFR BLD AUTO: 26.6 % (ref 31.2–41.9)
HGB BLD-MCNC: 8.6 G/DL (ref 10.9–14.3)
LYMPHOCYTES # BLD AUTO: 1 K/UL (ref 20–40)
LYMPHOCYTES NFR BLD AUTO: 12.5 % (ref 20.5–51.5)
MCH RBC QN AUTO: 29.6 UUG (ref 24.7–32.8)
MCHC RBC AUTO-ENTMCNC: 32 G/DL (ref 32.3–35.6)
MCV RBC AUTO: 91.4 FL (ref 75.5–95.3)
MONOCYTES # BLD AUTO: 0.5 K/UL (ref 2–10)
MONOCYTES NFR BLD AUTO: 6.5 % (ref 0–11)
NEUTROPHILS # BLD AUTO: 6.4 K/UL (ref 1.8–8.9)
NEUTROPHILS NFR BLD AUTO: 80.3 % (ref 38.5–71.5)
PLATELET # BLD AUTO: 488 K/UL (ref 179–408)
POTASSIUM SERPL-SCNC: 3.7 MMOL/L (ref 3.5–5.1)
RBC # BLD AUTO: 2.91 MIL/UL (ref 3.63–4.92)
WBC # BLD AUTO: 8 K/UL (ref 3.8–11.8)

## 2020-03-04 RX ADMIN — LEVETIRACETAM SCH MG: 500 TABLET, FILM COATED ORAL at 20:20

## 2020-03-04 RX ADMIN — VANCOMYCIN HYDROCHLORIDE SCH MG: 500 INJECTION, POWDER, LYOPHILIZED, FOR SOLUTION INTRAVENOUS at 18:19

## 2020-03-04 RX ADMIN — LACTOBACILLUS ACIDOPH-L.BULGARICUS 1 MILLION CELL CHEWABLE TABLET SCH TAB.CHEW: at 18:19

## 2020-03-04 RX ADMIN — VANCOMYCIN HYDROCHLORIDE SCH MG: 500 INJECTION, POWDER, LYOPHILIZED, FOR SOLUTION INTRAVENOUS at 06:00

## 2020-03-04 RX ADMIN — VITAMIN D, TAB 1000IU (100/BT) SCH UNIT: 25 TAB at 08:59

## 2020-03-04 RX ADMIN — VANCOMYCIN HYDROCHLORIDE SCH MG: 500 INJECTION, POWDER, LYOPHILIZED, FOR SOLUTION INTRAVENOUS at 23:14

## 2020-03-04 RX ADMIN — ANORECTAL OINTMENT PRN APPLIC: 15.7; .44; 24; 20.6 OINTMENT TOPICAL at 20:55

## 2020-03-04 RX ADMIN — CYANOCOBALAMIN TAB 1000 MCG SCH MCG: 1000 TAB at 09:00

## 2020-03-04 RX ADMIN — LEVETIRACETAM SCH MG: 500 TABLET, FILM COATED ORAL at 08:58

## 2020-03-04 RX ADMIN — VANCOMYCIN HYDROCHLORIDE SCH MG: 500 INJECTION, POWDER, LYOPHILIZED, FOR SOLUTION INTRAVENOUS at 11:58

## 2020-03-04 RX ADMIN — Medication SCH ML: at 08:00

## 2020-03-04 RX ADMIN — METRONIDAZOLE SCH MLS/HR: 500 SOLUTION INTRAVENOUS at 14:48

## 2020-03-04 RX ADMIN — LACTOBACILLUS ACIDOPH-L.BULGARICUS 1 MILLION CELL CHEWABLE TABLET SCH TAB.CHEW: at 08:58

## 2020-03-04 RX ADMIN — METRONIDAZOLE SCH MLS/HR: 500 SOLUTION INTRAVENOUS at 06:09

## 2020-03-04 RX ADMIN — FOLIC ACID SCH MG: 1 TABLET ORAL at 08:58

## 2020-03-04 RX ADMIN — PANTOPRAZOLE SODIUM SCH MG: 40 GRANULE, DELAYED RELEASE ORAL at 08:58

## 2020-03-04 RX ADMIN — BACITRACIN, NEOMYCIN, POLYMYXIN B SCH APP: 400; 3.5; 5 OINTMENT TOPICAL at 09:02

## 2020-03-04 RX ADMIN — METRONIDAZOLE SCH MLS/HR: 500 SOLUTION INTRAVENOUS at 21:56

## 2020-03-04 NOTE — NUR
report from day RN neno patient for [facility] in am , for peg 
placement and trach placement.patient orally intubated 7.0/23 cm ac 12/450/30% peep 
5.tolerating vent settings saturation 100% rr 16, no respiratory distress noted .tf in 
progress on Glucerna at 50 ml/hr in progress via ngt hob up aspiration precaution observed 
,rectal tube in placed and patent. patient open eyes on and off but doesn't follow commands 
. f/c to bsd . continue to monitor v/s and vent .contact isolation for c diff .

## 2020-03-04 NOTE — NUR
PT IS SCHEDULED TO BE PICKED UP BY [facility] AT 1130AM. PT'S CONDITION IS STABLE. 
AFEBRILE.

## 2020-03-04 NOTE — NUR
patient is awake by light pain , withdraws to pain , , vent settings ac 12 tv 450 , p5 fio2 
30 % , kapoor and picc line intact , flexi sea intact , no fever , ngt intact , and clamped

## 2020-03-04 NOTE — NUR
due medication given and scan medications , turned and reposition patient .v/s wnl and 
tolerating vent settings .

## 2020-03-04 NOTE — NUR
RESTARTED TUBE FEEDING AS ORDERED, GLUCERNA 1.2 AT 50ML/HR. PT IS MORE AWAKE, BLEEDING IN 
THE LIPS IS LESS.

## 2020-03-04 NOTE — NUR
RECEIVED PATIENT ON OBREGON VENTILATOR WITH THE FOLLOWING SETTINGS THAT ARE CHARTED ON THE 
MECHANICAL VENT NOTES. NO SOB NOTED AT THIS TIME. ET TUBE IS SECURED WITH ANCHOR FAST. ORAL 
CARE DONE. HME CHANGED. ALARMS ARE ON AND AUDIBLE. NO SOB NOTED AT THIS TIME. WILL CONTINUE 
TO MONITOR PATIENT.

## 2020-03-05 VITALS — SYSTOLIC BLOOD PRESSURE: 145 MMHG | DIASTOLIC BLOOD PRESSURE: 88 MMHG

## 2020-03-05 VITALS — DIASTOLIC BLOOD PRESSURE: 83 MMHG | SYSTOLIC BLOOD PRESSURE: 138 MMHG

## 2020-03-05 VITALS — DIASTOLIC BLOOD PRESSURE: 72 MMHG | SYSTOLIC BLOOD PRESSURE: 132 MMHG

## 2020-03-05 VITALS — SYSTOLIC BLOOD PRESSURE: 140 MMHG | DIASTOLIC BLOOD PRESSURE: 75 MMHG

## 2020-03-05 VITALS — DIASTOLIC BLOOD PRESSURE: 56 MMHG | SYSTOLIC BLOOD PRESSURE: 121 MMHG

## 2020-03-05 VITALS — DIASTOLIC BLOOD PRESSURE: 85 MMHG | SYSTOLIC BLOOD PRESSURE: 132 MMHG

## 2020-03-05 VITALS — DIASTOLIC BLOOD PRESSURE: 85 MMHG | SYSTOLIC BLOOD PRESSURE: 136 MMHG

## 2020-03-05 VITALS — DIASTOLIC BLOOD PRESSURE: 73 MMHG | SYSTOLIC BLOOD PRESSURE: 136 MMHG

## 2020-03-05 VITALS — DIASTOLIC BLOOD PRESSURE: 83 MMHG | SYSTOLIC BLOOD PRESSURE: 134 MMHG

## 2020-03-05 VITALS — DIASTOLIC BLOOD PRESSURE: 90 MMHG | SYSTOLIC BLOOD PRESSURE: 138 MMHG

## 2020-03-05 LAB
BASOPHILS # BLD AUTO: 0 K/UL (ref 0–8)
BASOPHILS NFR BLD AUTO: 0.1 % (ref 0–2)
BUN SERPL-MCNC: 62 MG/DL (ref 7–18)
CHLORIDE SERPL-SCNC: 111 MMOL/L (ref 98–107)
CO2 SERPL-SCNC: 29 MMOL/L (ref 21–32)
CREAT SERPL-MCNC: 1.2 MG/DL (ref 0.6–1.3)
EOSINOPHIL # BLD AUTO: 0 K/UL (ref 0–0.7)
EOSINOPHIL NFR BLD AUTO: 0.5 % (ref 0–7)
GLUCOSE SERPL-MCNC: 104 MG/DL (ref 74–106)
HCT VFR BLD AUTO: 26.1 % (ref 31.2–41.9)
HGB BLD-MCNC: 8.7 G/DL (ref 10.9–14.3)
LYMPHOCYTES # BLD AUTO: 0.9 K/UL (ref 20–40)
LYMPHOCYTES NFR BLD AUTO: 10.9 % (ref 20.5–51.5)
MAGNESIUM SERPL-MCNC: 2.1 MG/DL (ref 1.8–2.4)
MCH RBC QN AUTO: 30.5 UUG (ref 24.7–32.8)
MCHC RBC AUTO-ENTMCNC: 33 G/DL (ref 32.3–35.6)
MCV RBC AUTO: 92.1 FL (ref 75.5–95.3)
MONOCYTES # BLD AUTO: 0.6 K/UL (ref 2–10)
MONOCYTES NFR BLD AUTO: 7.3 % (ref 0–11)
NEUTROPHILS # BLD AUTO: 6.5 K/UL (ref 1.8–8.9)
NEUTROPHILS NFR BLD AUTO: 81.2 % (ref 38.5–71.5)
PHOSPHATE SERPL-MCNC: 4 MG/DL (ref 2.5–4.9)
PLATELET # BLD AUTO: 544 K/UL (ref 179–408)
POTASSIUM SERPL-SCNC: 3.6 MMOL/L (ref 3.5–5.1)
RBC # BLD AUTO: 2.83 MIL/UL (ref 3.63–4.92)
WBC # BLD AUTO: 8 K/UL (ref 3.8–11.8)

## 2020-03-05 RX ADMIN — LEVETIRACETAM SCH MG: 500 TABLET, FILM COATED ORAL at 08:10

## 2020-03-05 RX ADMIN — METRONIDAZOLE SCH MLS/HR: 500 SOLUTION INTRAVENOUS at 06:54

## 2020-03-05 RX ADMIN — Medication SCH ML: at 08:09

## 2020-03-05 RX ADMIN — LACTOBACILLUS ACIDOPH-L.BULGARICUS 1 MILLION CELL CHEWABLE TABLET SCH TAB.CHEW: at 08:09

## 2020-03-05 RX ADMIN — BACITRACIN, NEOMYCIN, POLYMYXIN B SCH APP: 400; 3.5; 5 OINTMENT TOPICAL at 08:11

## 2020-03-05 RX ADMIN — VANCOMYCIN HYDROCHLORIDE SCH MG: 500 INJECTION, POWDER, LYOPHILIZED, FOR SOLUTION INTRAVENOUS at 06:54

## 2020-03-05 RX ADMIN — PANTOPRAZOLE SODIUM SCH MG: 40 GRANULE, DELAYED RELEASE ORAL at 08:09

## 2020-03-05 NOTE — NUR
am care done ,bath patient ,changed soiled linens and gown . irrigated rectal tube obtained 
liquid stools 100 ml.f/c done .suction patient via mouth and via ett tube .changed sacral 
dressing and follow wound care treatment ,cleanse with normal saline pat dry place petroleum 
dressing and cover with Mepilex,turned and reposition .

## 2020-03-05 NOTE — NUR
Pt discharged to [facility] with ambulance. VSS wnl. Pt discharged with LUCRETIA PICC (3 
lumen), R nares NGT, kapoor catheter, and rectal tube flexi-seal. All belongings reviewed and 
brought with the pt. Pt stable and nad noted upon discharge.

## 2020-03-05 NOTE — NUR
Dr. Mendez here to see pt. Full report given. Pt to be discharged today to [facility] per MD.

## 2020-03-05 NOTE — NUR
Spoke with MISSY Harris at [facility] and made aware that pt did not receive any 
anticoagulants during pt's stay here at the hospital. No anticoagulants given today prior to 
discharge.

## 2020-03-06 ENCOUNTER — HOSPITAL ENCOUNTER (INPATIENT)
Dept: HOSPITAL 12 - ICU | Age: 85
LOS: 6 days | Discharge: NURSING, REHAB OR LONG-TERM CARE | DRG: 853 | End: 2020-03-12
Payer: MEDICARE

## 2020-03-06 VITALS — SYSTOLIC BLOOD PRESSURE: 131 MMHG | DIASTOLIC BLOOD PRESSURE: 83 MMHG

## 2020-03-06 VITALS — SYSTOLIC BLOOD PRESSURE: 132 MMHG | DIASTOLIC BLOOD PRESSURE: 79 MMHG

## 2020-03-06 VITALS — SYSTOLIC BLOOD PRESSURE: 131 MMHG | DIASTOLIC BLOOD PRESSURE: 84 MMHG

## 2020-03-06 VITALS — WEIGHT: 125 LBS | BODY MASS INDEX: 23 KG/M2 | HEIGHT: 62 IN

## 2020-03-06 VITALS — SYSTOLIC BLOOD PRESSURE: 130 MMHG | DIASTOLIC BLOOD PRESSURE: 83 MMHG

## 2020-03-06 VITALS — DIASTOLIC BLOOD PRESSURE: 86 MMHG | SYSTOLIC BLOOD PRESSURE: 132 MMHG

## 2020-03-06 DIAGNOSIS — F41.9: ICD-10-CM

## 2020-03-06 DIAGNOSIS — N18.4: ICD-10-CM

## 2020-03-06 DIAGNOSIS — R13.10: ICD-10-CM

## 2020-03-06 DIAGNOSIS — I44.0: ICD-10-CM

## 2020-03-06 DIAGNOSIS — F32.9: ICD-10-CM

## 2020-03-06 DIAGNOSIS — I49.1: ICD-10-CM

## 2020-03-06 DIAGNOSIS — J69.0: ICD-10-CM

## 2020-03-06 DIAGNOSIS — A04.72: ICD-10-CM

## 2020-03-06 DIAGNOSIS — G92: ICD-10-CM

## 2020-03-06 DIAGNOSIS — M41.9: ICD-10-CM

## 2020-03-06 DIAGNOSIS — I25.2: ICD-10-CM

## 2020-03-06 DIAGNOSIS — Z99.11: ICD-10-CM

## 2020-03-06 DIAGNOSIS — N18.9: ICD-10-CM

## 2020-03-06 DIAGNOSIS — I48.20: ICD-10-CM

## 2020-03-06 DIAGNOSIS — E87.0: ICD-10-CM

## 2020-03-06 DIAGNOSIS — Z90.710: ICD-10-CM

## 2020-03-06 DIAGNOSIS — L89.154: ICD-10-CM

## 2020-03-06 DIAGNOSIS — D68.59: ICD-10-CM

## 2020-03-06 DIAGNOSIS — I25.10: ICD-10-CM

## 2020-03-06 DIAGNOSIS — N81.4: ICD-10-CM

## 2020-03-06 DIAGNOSIS — I13.0: ICD-10-CM

## 2020-03-06 DIAGNOSIS — M48.02: ICD-10-CM

## 2020-03-06 DIAGNOSIS — D63.8: ICD-10-CM

## 2020-03-06 DIAGNOSIS — B00.1: ICD-10-CM

## 2020-03-06 DIAGNOSIS — Z93.0: ICD-10-CM

## 2020-03-06 DIAGNOSIS — M81.0: ICD-10-CM

## 2020-03-06 DIAGNOSIS — M50.30: ICD-10-CM

## 2020-03-06 DIAGNOSIS — Z79.82: ICD-10-CM

## 2020-03-06 DIAGNOSIS — R62.7: ICD-10-CM

## 2020-03-06 DIAGNOSIS — I08.1: ICD-10-CM

## 2020-03-06 DIAGNOSIS — I70.0: ICD-10-CM

## 2020-03-06 DIAGNOSIS — F03.90: ICD-10-CM

## 2020-03-06 DIAGNOSIS — J96.21: ICD-10-CM

## 2020-03-06 DIAGNOSIS — N17.0: ICD-10-CM

## 2020-03-06 DIAGNOSIS — R65.21: ICD-10-CM

## 2020-03-06 DIAGNOSIS — Z79.01: ICD-10-CM

## 2020-03-06 DIAGNOSIS — I48.0: ICD-10-CM

## 2020-03-06 DIAGNOSIS — I50.43: ICD-10-CM

## 2020-03-06 DIAGNOSIS — E43: ICD-10-CM

## 2020-03-06 DIAGNOSIS — Z86.718: ICD-10-CM

## 2020-03-06 DIAGNOSIS — E78.5: ICD-10-CM

## 2020-03-06 DIAGNOSIS — K29.70: ICD-10-CM

## 2020-03-06 DIAGNOSIS — I27.20: ICD-10-CM

## 2020-03-06 DIAGNOSIS — I25.5: ICD-10-CM

## 2020-03-06 DIAGNOSIS — R91.8: ICD-10-CM

## 2020-03-06 DIAGNOSIS — G40.909: ICD-10-CM

## 2020-03-06 DIAGNOSIS — G62.9: ICD-10-CM

## 2020-03-06 DIAGNOSIS — A40.9: Primary | ICD-10-CM

## 2020-03-06 LAB
ALP SERPL-CCNC: 74 U/L (ref 50–136)
ALT SERPL W/O P-5'-P-CCNC: 19 U/L (ref 14–59)
AST SERPL-CCNC: 21 U/L (ref 15–37)
BASOPHILS # BLD AUTO: 0 K/UL (ref 0–8)
BASOPHILS NFR BLD AUTO: 0.3 % (ref 0–2)
BILIRUB SERPL-MCNC: 0.3 MG/DL (ref 0.2–1)
BUN SERPL-MCNC: 56 MG/DL (ref 7–18)
CHLORIDE SERPL-SCNC: 110 MMOL/L (ref 98–107)
CO2 SERPL-SCNC: 27 MMOL/L (ref 21–32)
CREAT SERPL-MCNC: 1.4 MG/DL (ref 0.6–1.3)
EOSINOPHIL # BLD AUTO: 0 K/UL (ref 0–0.7)
EOSINOPHIL NFR BLD AUTO: 0.4 % (ref 0–7)
GLUCOSE SERPL-MCNC: 126 MG/DL (ref 74–106)
HCT VFR BLD AUTO: 24.4 % (ref 31.2–41.9)
HGB BLD-MCNC: 7.8 G/DL (ref 10.9–14.3)
LYMPHOCYTES # BLD AUTO: 0.8 K/UL (ref 20–40)
LYMPHOCYTES NFR BLD AUTO: 10.7 % (ref 20.5–51.5)
MCH RBC QN AUTO: 30 UUG (ref 24.7–32.8)
MCHC RBC AUTO-ENTMCNC: 32 G/DL (ref 32.3–35.6)
MCV RBC AUTO: 93.5 FL (ref 75.5–95.3)
MONOCYTES # BLD AUTO: 0.6 K/UL (ref 2–10)
MONOCYTES NFR BLD AUTO: 7.4 % (ref 0–11)
NEUTROPHILS # BLD AUTO: 6.4 K/UL (ref 1.8–8.9)
NEUTROPHILS NFR BLD AUTO: 81.2 % (ref 38.5–71.5)
PLATELET # BLD AUTO: 577 K/UL (ref 179–408)
POTASSIUM SERPL-SCNC: 3.5 MMOL/L (ref 3.5–5.1)
RBC # BLD AUTO: 2.61 MIL/UL (ref 3.63–4.92)
WBC # BLD AUTO: 7.9 K/UL (ref 3.8–11.8)
WS STN SPEC: 7 G/DL (ref 6.4–8.2)

## 2020-03-06 PROCEDURE — G0378 HOSPITAL OBSERVATION PER HR: HCPCS

## 2020-03-06 PROCEDURE — 5A1955Z RESPIRATORY VENTILATION, GREATER THAN 96 CONSECUTIVE HOURS: ICD-10-PCS

## 2020-03-06 PROCEDURE — A4217 STERILE WATER/SALINE, 500 ML: HCPCS

## 2020-03-06 RX ADMIN — METRONIDAZOLE SCH MLS/HR: 500 SOLUTION INTRAVENOUS at 23:55

## 2020-03-06 RX ADMIN — VANCOMYCIN HYDROCHLORIDE SCH MG: 500 INJECTION, POWDER, LYOPHILIZED, FOR SOLUTION INTRAVENOUS at 23:55

## 2020-03-06 RX ADMIN — FERROUS SULFATE TAB 325 MG (65 MG ELEMENTAL FE) SCH MG: 325 (65 FE) TAB at 21:00

## 2020-03-06 RX ADMIN — SENNOSIDES SCH TAB: 8.6 TABLET, COATED ORAL at 20:50

## 2020-03-06 RX ADMIN — FERROUS SULFATE TAB 325 MG (65 MG ELEMENTAL FE) SCH MG: 325 (65 FE) TAB at 21:04

## 2020-03-07 VITALS — SYSTOLIC BLOOD PRESSURE: 124 MMHG | DIASTOLIC BLOOD PRESSURE: 77 MMHG

## 2020-03-07 VITALS — SYSTOLIC BLOOD PRESSURE: 118 MMHG | DIASTOLIC BLOOD PRESSURE: 71 MMHG

## 2020-03-07 VITALS — SYSTOLIC BLOOD PRESSURE: 117 MMHG | DIASTOLIC BLOOD PRESSURE: 71 MMHG

## 2020-03-07 VITALS — DIASTOLIC BLOOD PRESSURE: 81 MMHG | SYSTOLIC BLOOD PRESSURE: 134 MMHG

## 2020-03-07 VITALS — DIASTOLIC BLOOD PRESSURE: 85 MMHG | SYSTOLIC BLOOD PRESSURE: 137 MMHG

## 2020-03-07 VITALS — DIASTOLIC BLOOD PRESSURE: 76 MMHG | SYSTOLIC BLOOD PRESSURE: 129 MMHG

## 2020-03-07 VITALS — DIASTOLIC BLOOD PRESSURE: 75 MMHG | SYSTOLIC BLOOD PRESSURE: 120 MMHG

## 2020-03-07 VITALS — DIASTOLIC BLOOD PRESSURE: 76 MMHG | SYSTOLIC BLOOD PRESSURE: 118 MMHG

## 2020-03-07 VITALS — SYSTOLIC BLOOD PRESSURE: 142 MMHG | DIASTOLIC BLOOD PRESSURE: 84 MMHG

## 2020-03-07 VITALS — SYSTOLIC BLOOD PRESSURE: 138 MMHG | DIASTOLIC BLOOD PRESSURE: 75 MMHG

## 2020-03-07 VITALS — SYSTOLIC BLOOD PRESSURE: 131 MMHG | DIASTOLIC BLOOD PRESSURE: 85 MMHG

## 2020-03-07 VITALS — SYSTOLIC BLOOD PRESSURE: 129 MMHG | DIASTOLIC BLOOD PRESSURE: 78 MMHG

## 2020-03-07 VITALS — SYSTOLIC BLOOD PRESSURE: 118 MMHG | DIASTOLIC BLOOD PRESSURE: 82 MMHG

## 2020-03-07 VITALS — SYSTOLIC BLOOD PRESSURE: 134 MMHG | DIASTOLIC BLOOD PRESSURE: 90 MMHG

## 2020-03-07 VITALS — SYSTOLIC BLOOD PRESSURE: 118 MMHG | DIASTOLIC BLOOD PRESSURE: 72 MMHG

## 2020-03-07 VITALS — DIASTOLIC BLOOD PRESSURE: 78 MMHG | SYSTOLIC BLOOD PRESSURE: 126 MMHG

## 2020-03-07 VITALS — DIASTOLIC BLOOD PRESSURE: 80 MMHG | SYSTOLIC BLOOD PRESSURE: 130 MMHG

## 2020-03-07 VITALS — DIASTOLIC BLOOD PRESSURE: 85 MMHG | SYSTOLIC BLOOD PRESSURE: 130 MMHG

## 2020-03-07 LAB
BASE EXCESS BLDA CALC-SCNC: -3 MMOL/L
BASOPHILS # BLD AUTO: 0 K/UL (ref 0–8)
BASOPHILS NFR BLD AUTO: 0.1 % (ref 0–2)
BUN SERPL-MCNC: 58 MG/DL (ref 7–18)
CHLORIDE SERPL-SCNC: 108 MMOL/L (ref 98–107)
CO2 SERPL-SCNC: 25 MMOL/L (ref 21–32)
CREAT SERPL-MCNC: 1.4 MG/DL (ref 0.6–1.3)
EOSINOPHIL # BLD AUTO: 0 K/UL (ref 0–0.7)
EOSINOPHIL NFR BLD AUTO: 0.6 % (ref 0–7)
GLUCOSE SERPL-MCNC: 93 MG/DL (ref 74–106)
HCO3 BLDA-SCNC: 20.6 MMOL/L
HCT VFR BLD AUTO: 23.5 % (ref 31.2–41.9)
HGB BLD-MCNC: 7.7 G/DL (ref 10.9–14.3)
HGB BLDA OXIMETRY-MCNC: 8.1 G/DL (ref 12–16)
INHALED O2 CONCENTRATION: 30 %
INTRINSIC PEEP RESPIRATORY: 5 CMH20
LYMPHOCYTES # BLD AUTO: 0.8 K/UL (ref 20–40)
LYMPHOCYTES NFR BLD AUTO: 12.5 % (ref 20.5–51.5)
MAGNESIUM SERPL-MCNC: 2 MG/DL (ref 1.8–2.4)
MCH RBC QN AUTO: 30.2 UUG (ref 24.7–32.8)
MCHC RBC AUTO-ENTMCNC: 33 G/DL (ref 32.3–35.6)
MCV RBC AUTO: 92.2 FL (ref 75.5–95.3)
MONOCYTES # BLD AUTO: 0.5 K/UL (ref 2–10)
MONOCYTES NFR BLD AUTO: 7.3 % (ref 0–11)
NEUTROPHILS # BLD AUTO: 5.4 K/UL (ref 1.8–8.9)
NEUTROPHILS NFR BLD AUTO: 79.5 % (ref 38.5–71.5)
PCO2 TEMP ADJ BLDA: 31.1 MMHG (ref 35–45)
PEEP SETTING VENT: 450 ML
PH TEMP ADJ BLDA: 7.44 [PH] (ref 7.35–7.45)
PHOSPHATE SERPL-MCNC: 4 MG/DL (ref 2.5–4.9)
PLATELET # BLD AUTO: 565 K/UL (ref 179–408)
PO2 TEMP ADJ BLDA: 114.1 MMHG (ref 75–100)
POTASSIUM SERPL-SCNC: 3.4 MMOL/L (ref 3.5–5.1)
RBC # BLD AUTO: 2.54 MIL/UL (ref 3.63–4.92)
SET RATE, BG: 12
SPECIMEN DRAWN FROM PATIENT: (no result)
VENTILATION MODE VENT: (no result)
WBC # BLD AUTO: 6.7 K/UL (ref 3.8–11.8)

## 2020-03-07 RX ADMIN — ATORVASTATIN CALCIUM SCH MG: 20 TABLET, FILM COATED ORAL at 09:36

## 2020-03-07 RX ADMIN — ASCORBIC ACID TAB 250 MG SCH MG: 250 TAB at 21:47

## 2020-03-07 RX ADMIN — VITAMIN D, TAB 1000IU (100/BT) SCH UNIT: 25 TAB at 09:31

## 2020-03-07 RX ADMIN — POTASSIUM CHLORIDE SCH MLS/HR: 14.9 INJECTION, SOLUTION INTRAVENOUS at 12:44

## 2020-03-07 RX ADMIN — DEXTROSE AND SODIUM CHLORIDE PRN MLS/HR: 5; .9 INJECTION, SOLUTION INTRAVENOUS at 10:51

## 2020-03-07 RX ADMIN — SENNOSIDES SCH TAB: 8.6 TABLET, COATED ORAL at 21:00

## 2020-03-07 RX ADMIN — LACTOBACILLUS ACIDOPH-L.BULGARICUS 1 MILLION CELL CHEWABLE TABLET SCH TAB.CHEW: at 09:34

## 2020-03-07 RX ADMIN — METRONIDAZOLE SCH MLS/HR: 500 SOLUTION INTRAVENOUS at 21:53

## 2020-03-07 RX ADMIN — CYANOCOBALAMIN TAB 1000 MCG SCH MCG: 1000 TAB at 09:31

## 2020-03-07 RX ADMIN — FERROUS SULFATE TAB 325 MG (65 MG ELEMENTAL FE) SCH MG: 325 (65 FE) TAB at 21:47

## 2020-03-07 RX ADMIN — GABAPENTIN SCH MG: 100 CAPSULE ORAL at 16:24

## 2020-03-07 RX ADMIN — VANCOMYCIN HYDROCHLORIDE SCH MG: 500 INJECTION, POWDER, LYOPHILIZED, FOR SOLUTION INTRAVENOUS at 18:03

## 2020-03-07 RX ADMIN — VANCOMYCIN HYDROCHLORIDE SCH MG: 500 INJECTION, POWDER, LYOPHILIZED, FOR SOLUTION INTRAVENOUS at 09:30

## 2020-03-07 RX ADMIN — ESCITALOPRAM OXALATE SCH MG: 10 TABLET, FILM COATED ORAL at 09:34

## 2020-03-07 RX ADMIN — GABAPENTIN SCH MG: 100 CAPSULE ORAL at 09:35

## 2020-03-07 RX ADMIN — POTASSIUM CHLORIDE SCH MLS/HR: 14.9 INJECTION, SOLUTION INTRAVENOUS at 10:50

## 2020-03-07 RX ADMIN — METRONIDAZOLE SCH MLS/HR: 500 SOLUTION INTRAVENOUS at 09:30

## 2020-03-07 RX ADMIN — LEVETIRACETAM SCH MG: 100 SOLUTION ORAL at 21:47

## 2020-03-07 RX ADMIN — METRONIDAZOLE SCH MLS/HR: 500 SOLUTION INTRAVENOUS at 05:41

## 2020-03-07 RX ADMIN — GABAPENTIN SCH MG: 100 CAPSULE ORAL at 12:44

## 2020-03-07 RX ADMIN — VANCOMYCIN HYDROCHLORIDE SCH MG: 500 INJECTION, POWDER, LYOPHILIZED, FOR SOLUTION INTRAVENOUS at 12:45

## 2020-03-07 RX ADMIN — FOLIC ACID SCH MG: 1 TABLET ORAL at 09:36

## 2020-03-07 RX ADMIN — FAMOTIDINE SCH MG: 20 TABLET, FILM COATED ORAL at 09:35

## 2020-03-07 RX ADMIN — LACTOBACILLUS ACIDOPH-L.BULGARICUS 1 MILLION CELL CHEWABLE TABLET SCH TAB.CHEW: at 16:22

## 2020-03-07 NOTE — NUR
Patient is on stuart vent a/c 12, fio2 40%, PEEP 5, , new shiley 8 tracheostomy with 
continuous pulse ox saturation of 100%.  Patient has a clamped NG tube, and remains NPO.  
PICC line in right upper arm, Rectal tube in place, and kapoor catheter draining veronica 
colored urine.  IV Fluids D5ns @75cc/hr.  Patient continues to be on contact isolation for 
C-diff. No distress noted at this time, mattress inflated, Patient on Telemetry monitoring.

## 2020-03-07 NOTE — NUR
received , calm , no distress , picc line laura ,  intact , flexi seal and kapoor intact , 
trach  with sutures intact , vent settings ac 12 , tv , 450 p 5 , fio2 30 % , ngt ,  
placement checked and ausculatated , . feeding clamped

## 2020-03-08 VITALS — DIASTOLIC BLOOD PRESSURE: 92 MMHG | SYSTOLIC BLOOD PRESSURE: 142 MMHG

## 2020-03-08 VITALS — SYSTOLIC BLOOD PRESSURE: 130 MMHG | DIASTOLIC BLOOD PRESSURE: 89 MMHG

## 2020-03-08 VITALS — SYSTOLIC BLOOD PRESSURE: 127 MMHG | DIASTOLIC BLOOD PRESSURE: 86 MMHG

## 2020-03-08 VITALS — DIASTOLIC BLOOD PRESSURE: 88 MMHG | SYSTOLIC BLOOD PRESSURE: 127 MMHG

## 2020-03-08 VITALS — SYSTOLIC BLOOD PRESSURE: 127 MMHG | DIASTOLIC BLOOD PRESSURE: 83 MMHG

## 2020-03-08 LAB
BUN SERPL-MCNC: 53 MG/DL (ref 7–18)
CHLORIDE SERPL-SCNC: 112 MMOL/L (ref 98–107)
CO2 SERPL-SCNC: 23 MMOL/L (ref 21–32)
CREAT SERPL-MCNC: 1.5 MG/DL (ref 0.6–1.3)
GLUCOSE SERPL-MCNC: 112 MG/DL (ref 74–106)
POTASSIUM SERPL-SCNC: 3.7 MMOL/L (ref 3.5–5.1)

## 2020-03-08 PROCEDURE — 0DH63UZ INSERTION OF FEEDING DEVICE INTO STOMACH, PERCUTANEOUS APPROACH: ICD-10-PCS

## 2020-03-08 PROCEDURE — 0DB68ZX EXCISION OF STOMACH, VIA NATURAL OR ARTIFICIAL OPENING ENDOSCOPIC, DIAGNOSTIC: ICD-10-PCS

## 2020-03-08 RX ADMIN — METRONIDAZOLE SCH MLS/HR: 500 SOLUTION INTRAVENOUS at 22:01

## 2020-03-08 RX ADMIN — FOLIC ACID SCH MG: 1 TABLET ORAL at 09:39

## 2020-03-08 RX ADMIN — ASCORBIC ACID TAB 250 MG SCH MG: 250 TAB at 09:39

## 2020-03-08 RX ADMIN — METRONIDAZOLE SCH MLS/HR: 500 SOLUTION INTRAVENOUS at 15:08

## 2020-03-08 RX ADMIN — DEXTROSE AND SODIUM CHLORIDE PRN MLS/HR: 5; .9 INJECTION, SOLUTION INTRAVENOUS at 03:09

## 2020-03-08 RX ADMIN — VANCOMYCIN HYDROCHLORIDE SCH MG: 500 INJECTION, POWDER, LYOPHILIZED, FOR SOLUTION INTRAVENOUS at 00:26

## 2020-03-08 RX ADMIN — GABAPENTIN SCH MG: 100 CAPSULE ORAL at 17:33

## 2020-03-08 RX ADMIN — GABAPENTIN SCH MG: 100 CAPSULE ORAL at 09:38

## 2020-03-08 RX ADMIN — GABAPENTIN SCH MG: 100 CAPSULE ORAL at 13:41

## 2020-03-08 RX ADMIN — VITAMIN D, TAB 1000IU (100/BT) SCH UNIT: 25 TAB at 09:38

## 2020-03-08 RX ADMIN — CYANOCOBALAMIN TAB 1000 MCG SCH MCG: 1000 TAB at 09:39

## 2020-03-08 RX ADMIN — ASCORBIC ACID TAB 250 MG SCH MG: 250 TAB at 20:43

## 2020-03-08 RX ADMIN — VANCOMYCIN HYDROCHLORIDE SCH MG: 500 INJECTION, POWDER, LYOPHILIZED, FOR SOLUTION INTRAVENOUS at 13:41

## 2020-03-08 RX ADMIN — LACTOBACILLUS ACIDOPH-L.BULGARICUS 1 MILLION CELL CHEWABLE TABLET SCH TAB.CHEW: at 17:33

## 2020-03-08 RX ADMIN — ATORVASTATIN CALCIUM SCH MG: 20 TABLET, FILM COATED ORAL at 09:39

## 2020-03-08 RX ADMIN — ESCITALOPRAM OXALATE SCH MG: 10 TABLET, FILM COATED ORAL at 09:38

## 2020-03-08 RX ADMIN — FAMOTIDINE SCH MG: 20 TABLET, FILM COATED ORAL at 09:38

## 2020-03-08 RX ADMIN — VANCOMYCIN HYDROCHLORIDE SCH MG: 500 INJECTION, POWDER, LYOPHILIZED, FOR SOLUTION INTRAVENOUS at 18:00

## 2020-03-08 RX ADMIN — LACTOBACILLUS ACIDOPH-L.BULGARICUS 1 MILLION CELL CHEWABLE TABLET SCH TAB.CHEW: at 09:39

## 2020-03-08 RX ADMIN — FERROUS SULFATE TAB 325 MG (65 MG ELEMENTAL FE) SCH MG: 325 (65 FE) TAB at 20:43

## 2020-03-08 RX ADMIN — VANCOMYCIN HYDROCHLORIDE SCH MG: 500 INJECTION, POWDER, LYOPHILIZED, FOR SOLUTION INTRAVENOUS at 23:42

## 2020-03-08 RX ADMIN — SENNOSIDES SCH TAB: 8.6 TABLET, COATED ORAL at 20:42

## 2020-03-08 RX ADMIN — ZINC SULFATE CAP 220 MG (50 MG ELEMENTAL ZN) SCH MG: 220 (50 ZN) CAP at 09:38

## 2020-03-08 RX ADMIN — METRONIDAZOLE SCH MLS/HR: 500 SOLUTION INTRAVENOUS at 06:32

## 2020-03-08 RX ADMIN — LEVETIRACETAM SCH MG: 100 SOLUTION ORAL at 09:37

## 2020-03-08 RX ADMIN — LEVETIRACETAM SCH MG: 100 SOLUTION ORAL at 20:42

## 2020-03-08 RX ADMIN — DEXTROSE AND SODIUM CHLORIDE PRN MLS/HR: 5; .9 INJECTION, SOLUTION INTRAVENOUS at 19:58

## 2020-03-08 RX ADMIN — VANCOMYCIN HYDROCHLORIDE SCH MG: 500 INJECTION, POWDER, LYOPHILIZED, FOR SOLUTION INTRAVENOUS at 06:32

## 2020-03-08 NOTE — NUR
* Maintains baseline ABG's

* Evidences usual mental status

* Evidences usual skin color

-------------------------------------------------------------------------------

Addendum: 03/08/20 at 0126 by ONI SCHMITT RN

-------------------------------------------------------------------------------

Amended: Links added.

## 2020-03-08 NOTE — NUR
* Exhibits granulation/healing at site

* Exhibits decreased drainage at site

* Exhibits no s/s of infection

* Exhibits a  decrease in lesion size

* Maintains nutritional status

* Maintains hydration status

-------------------------------------------------------------------------------

Addendum: 03/08/20 at 0126 by ONI SCHMITT RN

-------------------------------------------------------------------------------

Amended: Links added.

## 2020-03-08 NOTE — NUR
NOTIFIED DR TAVAREZ ( ANESTHESIA) REGARDING THE RESULT OF THE REPEAT ECHO, RVSP OF 53. ALSO 
NOTIFIED DR IRVING (CARDIOLOGIST) AND HE WILL SEE THE PT LATER.

## 2020-03-08 NOTE — NUR
Received patient in bed asleep, eyes open to touch. Trache Shiley #8 vent settings AC 12, 
30% FiO2 450 Vt. LUCRETIA PICC line. Flexi-seal rectal tube in place draining dark brown liquid 
stool. New PEG tube in left abdomen patent, insertion site clear of signs of infection. no 
bleeding noted. PEG use is meds only, to start feeding Glucerna 1.2 in the AM

## 2020-03-08 NOTE — NUR
* Understands anatomy and physiology

* Describes reportable s/s

* Understands treatment plan

* Understands medication regime

-------------------------------------------------------------------------------

Addendum: 03/08/20 at 0126 by ONI SCHMITT RN

-------------------------------------------------------------------------------

Amended: Links added.

## 2020-03-08 NOTE — NUR
* Maintains a patent airway

* Maintains vital signs WNL

* Maintains baseline ABG'S

* Maintains optimal breath sounds

-------------------------------------------------------------------------------

Addendum: 03/08/20 at 0126 by ONI SCHMITT RN

-------------------------------------------------------------------------------

Amended: Links added.

## 2020-03-08 NOTE — NUR
* Maintains vital signs WNL

* Evidences no purulent drainage from

   wounds, incisions, and tubes

* Maintains lab values WNL

-------------------------------------------------------------------------------

Addendum: 03/08/20 at 0126 by ONI SCHMITT RN

-------------------------------------------------------------------------------

Amended: Links added.

## 2020-03-08 NOTE — NUR
PT IS BACK FORM RECOVERY ROOM VIA BED. CONDITION IS STABLE. NPO TILL AM EXCEPT MEDICATIONS. 
ABDOMENAL BINDER IS IN PALCE AND INTACT.

## 2020-03-09 VITALS — SYSTOLIC BLOOD PRESSURE: 124 MMHG | DIASTOLIC BLOOD PRESSURE: 76 MMHG

## 2020-03-09 VITALS — SYSTOLIC BLOOD PRESSURE: 129 MMHG | DIASTOLIC BLOOD PRESSURE: 81 MMHG

## 2020-03-09 VITALS — DIASTOLIC BLOOD PRESSURE: 94 MMHG | SYSTOLIC BLOOD PRESSURE: 143 MMHG

## 2020-03-09 VITALS — SYSTOLIC BLOOD PRESSURE: 115 MMHG | DIASTOLIC BLOOD PRESSURE: 71 MMHG

## 2020-03-09 VITALS — DIASTOLIC BLOOD PRESSURE: 79 MMHG | SYSTOLIC BLOOD PRESSURE: 141 MMHG

## 2020-03-09 VITALS — SYSTOLIC BLOOD PRESSURE: 137 MMHG | DIASTOLIC BLOOD PRESSURE: 80 MMHG

## 2020-03-09 RX ADMIN — LEVETIRACETAM SCH MG: 100 SOLUTION ORAL at 21:16

## 2020-03-09 RX ADMIN — VANCOMYCIN HYDROCHLORIDE SCH MG: 500 INJECTION, POWDER, LYOPHILIZED, FOR SOLUTION INTRAVENOUS at 17:30

## 2020-03-09 RX ADMIN — VITAMIN D, TAB 1000IU (100/BT) SCH UNIT: 25 TAB at 09:48

## 2020-03-09 RX ADMIN — GABAPENTIN SCH MG: 100 CAPSULE ORAL at 09:49

## 2020-03-09 RX ADMIN — METRONIDAZOLE SCH MLS/HR: 500 SOLUTION INTRAVENOUS at 13:30

## 2020-03-09 RX ADMIN — FOLIC ACID SCH MG: 1 TABLET ORAL at 09:49

## 2020-03-09 RX ADMIN — ASCORBIC ACID TAB 250 MG SCH MG: 250 TAB at 21:17

## 2020-03-09 RX ADMIN — LEVETIRACETAM SCH MG: 100 SOLUTION ORAL at 09:48

## 2020-03-09 RX ADMIN — VANCOMYCIN HYDROCHLORIDE SCH MG: 500 INJECTION, POWDER, LYOPHILIZED, FOR SOLUTION INTRAVENOUS at 05:16

## 2020-03-09 RX ADMIN — FERROUS SULFATE TAB 325 MG (65 MG ELEMENTAL FE) SCH MG: 325 (65 FE) TAB at 21:17

## 2020-03-09 RX ADMIN — VANCOMYCIN HYDROCHLORIDE SCH MG: 500 INJECTION, POWDER, LYOPHILIZED, FOR SOLUTION INTRAVENOUS at 13:30

## 2020-03-09 RX ADMIN — FAMOTIDINE SCH MG: 20 TABLET, FILM COATED ORAL at 09:48

## 2020-03-09 RX ADMIN — ASCORBIC ACID TAB 250 MG SCH MG: 250 TAB at 09:49

## 2020-03-09 RX ADMIN — SENNOSIDES SCH TAB: 8.6 TABLET, COATED ORAL at 21:17

## 2020-03-09 RX ADMIN — GABAPENTIN SCH MG: 100 CAPSULE ORAL at 13:30

## 2020-03-09 RX ADMIN — LACTOBACILLUS ACIDOPH-L.BULGARICUS 1 MILLION CELL CHEWABLE TABLET SCH TAB.CHEW: at 17:30

## 2020-03-09 RX ADMIN — METRONIDAZOLE SCH MLS/HR: 500 SOLUTION INTRAVENOUS at 05:16

## 2020-03-09 RX ADMIN — CYANOCOBALAMIN TAB 1000 MCG SCH MCG: 1000 TAB at 09:50

## 2020-03-09 RX ADMIN — ZINC SULFATE CAP 220 MG (50 MG ELEMENTAL ZN) SCH MG: 220 (50 ZN) CAP at 09:48

## 2020-03-09 RX ADMIN — ESCITALOPRAM OXALATE SCH MG: 10 TABLET, FILM COATED ORAL at 09:49

## 2020-03-09 RX ADMIN — ATORVASTATIN CALCIUM SCH MG: 20 TABLET, FILM COATED ORAL at 09:49

## 2020-03-09 RX ADMIN — METRONIDAZOLE SCH MLS/HR: 500 SOLUTION INTRAVENOUS at 21:20

## 2020-03-09 RX ADMIN — LACTOBACILLUS ACIDOPH-L.BULGARICUS 1 MILLION CELL CHEWABLE TABLET SCH TAB.CHEW: at 09:48

## 2020-03-09 RX ADMIN — GABAPENTIN SCH MG: 100 CAPSULE ORAL at 17:30

## 2020-03-09 NOTE — NUR
Received patient in bed, asleep arousable to name. PEG tube in place. Sinus rhythm on 
monitor. Trach to vent. Continuous pulse ox saturating 99%. NAD noted.

## 2020-03-09 NOTE — NUR
RECEIVED PATIENT ASLEEP BUT AROUSABLE. PATIENT TRACH IN PLACE NO BLEEDING NOTED. PATIENT 
SATURATION WITHIN NORMAL RANGE, PATIENT CONTINUE ON CONTACT ISOLATION C DIFF. OBSERVED GOOD 
HAND WASHING. PATIENT TOLERATE GT FEEDING AT THIS TIME. CONT TO MONITOR.

## 2020-03-10 VITALS — DIASTOLIC BLOOD PRESSURE: 84 MMHG | SYSTOLIC BLOOD PRESSURE: 129 MMHG

## 2020-03-10 VITALS — SYSTOLIC BLOOD PRESSURE: 130 MMHG | DIASTOLIC BLOOD PRESSURE: 81 MMHG

## 2020-03-10 VITALS — SYSTOLIC BLOOD PRESSURE: 123 MMHG | DIASTOLIC BLOOD PRESSURE: 81 MMHG

## 2020-03-10 VITALS — DIASTOLIC BLOOD PRESSURE: 78 MMHG | SYSTOLIC BLOOD PRESSURE: 127 MMHG

## 2020-03-10 VITALS — DIASTOLIC BLOOD PRESSURE: 78 MMHG | SYSTOLIC BLOOD PRESSURE: 125 MMHG

## 2020-03-10 VITALS — DIASTOLIC BLOOD PRESSURE: 84 MMHG | SYSTOLIC BLOOD PRESSURE: 131 MMHG

## 2020-03-10 LAB
ALP SERPL-CCNC: 70 U/L (ref 50–136)
ALT SERPL W/O P-5'-P-CCNC: 12 U/L (ref 14–59)
AST SERPL-CCNC: 14 U/L (ref 15–37)
BASOPHILS # BLD AUTO: 0 K/UL (ref 0–8)
BASOPHILS NFR BLD AUTO: 0.3 % (ref 0–2)
BILIRUB SERPL-MCNC: 0.2 MG/DL (ref 0.2–1)
BUN SERPL-MCNC: 47 MG/DL (ref 7–18)
CHLORIDE SERPL-SCNC: 113 MMOL/L (ref 98–107)
CO2 SERPL-SCNC: 21 MMOL/L (ref 21–32)
CREAT SERPL-MCNC: 1.6 MG/DL (ref 0.6–1.3)
EOSINOPHIL # BLD AUTO: 0 K/UL (ref 0–0.7)
EOSINOPHIL NFR BLD AUTO: 0.4 % (ref 0–7)
GLUCOSE SERPL-MCNC: 104 MG/DL (ref 74–106)
HCT VFR BLD AUTO: 26.6 % (ref 31.2–41.9)
HGB BLD-MCNC: 8.5 G/DL (ref 10.9–14.3)
LYMPHOCYTES # BLD AUTO: 0.9 K/UL (ref 20–40)
LYMPHOCYTES NFR BLD AUTO: 12.4 % (ref 20.5–51.5)
MAGNESIUM SERPL-MCNC: 1.8 MG/DL (ref 1.8–2.4)
MCH RBC QN AUTO: 30.2 UUG (ref 24.7–32.8)
MCHC RBC AUTO-ENTMCNC: 32 G/DL (ref 32.3–35.6)
MCV RBC AUTO: 94.7 FL (ref 75.5–95.3)
MONOCYTES # BLD AUTO: 0.5 K/UL (ref 2–10)
MONOCYTES NFR BLD AUTO: 6.4 % (ref 0–11)
NEUTROPHILS # BLD AUTO: 5.7 K/UL (ref 1.8–8.9)
NEUTROPHILS NFR BLD AUTO: 80.5 % (ref 38.5–71.5)
PHOSPHATE SERPL-MCNC: 4.1 MG/DL (ref 2.5–4.9)
PLATELET # BLD AUTO: 552 K/UL (ref 179–408)
POTASSIUM SERPL-SCNC: 3.5 MMOL/L (ref 3.5–5.1)
RBC # BLD AUTO: 2.81 MIL/UL (ref 3.63–4.92)
WBC # BLD AUTO: 7 K/UL (ref 3.8–11.8)
WS STN SPEC: 6.6 G/DL (ref 6.4–8.2)

## 2020-03-10 RX ADMIN — ASCORBIC ACID TAB 250 MG SCH MG: 250 TAB at 20:27

## 2020-03-10 RX ADMIN — VANCOMYCIN HYDROCHLORIDE SCH MG: 500 INJECTION, POWDER, LYOPHILIZED, FOR SOLUTION INTRAVENOUS at 12:32

## 2020-03-10 RX ADMIN — SENNOSIDES SCH TAB: 8.6 TABLET, COATED ORAL at 20:27

## 2020-03-10 RX ADMIN — ATORVASTATIN CALCIUM SCH MG: 20 TABLET, FILM COATED ORAL at 08:08

## 2020-03-10 RX ADMIN — ASCORBIC ACID TAB 250 MG SCH MG: 250 TAB at 08:07

## 2020-03-10 RX ADMIN — FAMOTIDINE SCH MG: 20 TABLET, FILM COATED ORAL at 08:08

## 2020-03-10 RX ADMIN — VANCOMYCIN HYDROCHLORIDE SCH MG: 500 INJECTION, POWDER, LYOPHILIZED, FOR SOLUTION INTRAVENOUS at 17:45

## 2020-03-10 RX ADMIN — VANCOMYCIN HYDROCHLORIDE SCH MG: 500 INJECTION, POWDER, LYOPHILIZED, FOR SOLUTION INTRAVENOUS at 00:37

## 2020-03-10 RX ADMIN — GABAPENTIN SCH MG: 100 CAPSULE ORAL at 08:07

## 2020-03-10 RX ADMIN — METRONIDAZOLE SCH MLS/HR: 500 SOLUTION INTRAVENOUS at 22:47

## 2020-03-10 RX ADMIN — LEVETIRACETAM SCH MG: 100 SOLUTION ORAL at 20:27

## 2020-03-10 RX ADMIN — VITAMIN D, TAB 1000IU (100/BT) SCH UNIT: 25 TAB at 08:08

## 2020-03-10 RX ADMIN — CYANOCOBALAMIN TAB 1000 MCG SCH MCG: 1000 TAB at 08:07

## 2020-03-10 RX ADMIN — LACTOBACILLUS ACIDOPH-L.BULGARICUS 1 MILLION CELL CHEWABLE TABLET SCH TAB.CHEW: at 17:45

## 2020-03-10 RX ADMIN — FOLIC ACID SCH MG: 1 TABLET ORAL at 08:07

## 2020-03-10 RX ADMIN — VANCOMYCIN HYDROCHLORIDE SCH MG: 500 INJECTION, POWDER, LYOPHILIZED, FOR SOLUTION INTRAVENOUS at 23:18

## 2020-03-10 RX ADMIN — LEVETIRACETAM SCH MG: 100 SOLUTION ORAL at 08:06

## 2020-03-10 RX ADMIN — METRONIDAZOLE SCH MLS/HR: 500 SOLUTION INTRAVENOUS at 05:14

## 2020-03-10 RX ADMIN — ZINC SULFATE CAP 220 MG (50 MG ELEMENTAL ZN) SCH MG: 220 (50 ZN) CAP at 08:06

## 2020-03-10 RX ADMIN — LACTOBACILLUS ACIDOPH-L.BULGARICUS 1 MILLION CELL CHEWABLE TABLET SCH TAB.CHEW: at 08:08

## 2020-03-10 RX ADMIN — METRONIDAZOLE SCH MLS/HR: 500 SOLUTION INTRAVENOUS at 14:04

## 2020-03-10 RX ADMIN — FERROUS SULFATE TAB 325 MG (65 MG ELEMENTAL FE) SCH MG: 325 (65 FE) TAB at 20:27

## 2020-03-10 RX ADMIN — GABAPENTIN SCH MG: 100 CAPSULE ORAL at 12:32

## 2020-03-10 RX ADMIN — ESCITALOPRAM OXALATE SCH MG: 10 TABLET, FILM COATED ORAL at 08:08

## 2020-03-10 RX ADMIN — VANCOMYCIN HYDROCHLORIDE SCH MG: 500 INJECTION, POWDER, LYOPHILIZED, FOR SOLUTION INTRAVENOUS at 05:14

## 2020-03-10 NOTE — NUR
PATIENT ASLEEP BUT AROUSABLE. PATIENT WITH TRACH, TRACH INTACT NO BLEEDING NOTED, DRESSING 
WAS CHANGED FOR SOILAGE. ON VENT CHECK BY RT FOR SETTING AS ORDERED.  PATIENT SATING 
%, PATIENT TUBE FEEDING TOLERATE WELL NO NAUSEA NO VOMITING NO DIARRHEA , NO RESIDUAL 
NOTED. PATIENT HAS NO S/S OF PAIN AT THIS TIME.  CONT TO MONITOR.

## 2020-03-10 NOTE — NUR
Received patient laying in bed. HOB elevated. A/O x O but open eyes when name is called. No 
acute distress noted. Trach to vent. TELE SR at 84. Bed bound patient. Air mattress in 
place. Lizarraga draining clear and veronica urine. Patient is edematous in upper and lower 
extremities between 2+ to 3+. IV on the right upper arm patent and intact. Peg on the left 
upper abdomen C/D/I on abdominal binder. Safety initiated. Call light within reach.

## 2020-03-10 NOTE — NUR
Received patient in bed asleep, arousable to touch. Patient on trach to vent saturating on 
98%. Vent settings checked per order. SR on monitor. PICC line flushed and patent. Pain 
assessed using FLACC scale, no pain noted. NAD. All charting assessments and notes reviewed 
by Cheko Candelaria RN.

## 2020-03-10 NOTE — NUR
Contact isolation for Cdiff discontinued per MD order. Trach suctioned, patient tolerating 
well. Tube feeding restarted at 50 cc/hr, no residual.

## 2020-03-11 VITALS — SYSTOLIC BLOOD PRESSURE: 123 MMHG | DIASTOLIC BLOOD PRESSURE: 79 MMHG

## 2020-03-11 VITALS — SYSTOLIC BLOOD PRESSURE: 123 MMHG | DIASTOLIC BLOOD PRESSURE: 72 MMHG

## 2020-03-11 VITALS — DIASTOLIC BLOOD PRESSURE: 78 MMHG | SYSTOLIC BLOOD PRESSURE: 123 MMHG

## 2020-03-11 VITALS — SYSTOLIC BLOOD PRESSURE: 125 MMHG | DIASTOLIC BLOOD PRESSURE: 82 MMHG

## 2020-03-11 VITALS — DIASTOLIC BLOOD PRESSURE: 77 MMHG | SYSTOLIC BLOOD PRESSURE: 127 MMHG

## 2020-03-11 VITALS — SYSTOLIC BLOOD PRESSURE: 120 MMHG | DIASTOLIC BLOOD PRESSURE: 54 MMHG

## 2020-03-11 LAB
ALP SERPL-CCNC: 65 U/L (ref 50–136)
ALT SERPL W/O P-5'-P-CCNC: 9 U/L (ref 14–59)
AST SERPL-CCNC: 14 U/L (ref 15–37)
BASOPHILS # BLD AUTO: 0 K/UL (ref 0–8)
BASOPHILS NFR BLD AUTO: 0 % (ref 0–2)
BILIRUB SERPL-MCNC: 0.3 MG/DL (ref 0.2–1)
BUN SERPL-MCNC: 47 MG/DL (ref 7–18)
CHLORIDE SERPL-SCNC: 113 MMOL/L (ref 98–107)
CO2 SERPL-SCNC: 21 MMOL/L (ref 21–32)
CREAT SERPL-MCNC: 1.6 MG/DL (ref 0.6–1.3)
EOSINOPHIL # BLD AUTO: 0 K/UL (ref 0–0.7)
EOSINOPHIL NFR BLD AUTO: 0.4 % (ref 0–7)
GLUCOSE SERPL-MCNC: 100 MG/DL (ref 74–106)
HCT VFR BLD AUTO: 24.5 % (ref 31.2–41.9)
HGB BLD-MCNC: 8.1 G/DL (ref 10.9–14.3)
LYMPHOCYTES # BLD AUTO: 0.9 K/UL (ref 20–40)
LYMPHOCYTES NFR BLD AUTO: 11 % (ref 20.5–51.5)
MAGNESIUM SERPL-MCNC: 1.9 MG/DL (ref 1.8–2.4)
MCH RBC QN AUTO: 31 UUG (ref 24.7–32.8)
MCHC RBC AUTO-ENTMCNC: 33 G/DL (ref 32.3–35.6)
MCV RBC AUTO: 94.3 FL (ref 75.5–95.3)
MONOCYTES # BLD AUTO: 0.5 K/UL (ref 2–10)
MONOCYTES NFR BLD AUTO: 6.6 % (ref 0–11)
NEUTROPHILS # BLD AUTO: 6.6 K/UL (ref 1.8–8.9)
NEUTROPHILS NFR BLD AUTO: 82 % (ref 38.5–71.5)
PHOSPHATE SERPL-MCNC: 4.1 MG/DL (ref 2.5–4.9)
PLATELET # BLD AUTO: 491 K/UL (ref 179–408)
POTASSIUM SERPL-SCNC: 3.5 MMOL/L (ref 3.5–5.1)
RBC # BLD AUTO: 2.6 MIL/UL (ref 3.63–4.92)
WBC # BLD AUTO: 8.1 K/UL (ref 3.8–11.8)
WS STN SPEC: 6.4 G/DL (ref 6.4–8.2)

## 2020-03-11 RX ADMIN — Medication PRN ML: at 06:59

## 2020-03-11 RX ADMIN — VANCOMYCIN HYDROCHLORIDE SCH MG: 500 INJECTION, POWDER, LYOPHILIZED, FOR SOLUTION INTRAVENOUS at 05:22

## 2020-03-11 RX ADMIN — METRONIDAZOLE SCH MLS/HR: 500 SOLUTION INTRAVENOUS at 14:02

## 2020-03-11 RX ADMIN — FOLIC ACID SCH MG: 1 TABLET ORAL at 08:37

## 2020-03-11 RX ADMIN — LEVETIRACETAM SCH MG: 100 SOLUTION ORAL at 20:27

## 2020-03-11 RX ADMIN — ESCITALOPRAM OXALATE SCH MG: 10 TABLET, FILM COATED ORAL at 08:35

## 2020-03-11 RX ADMIN — ZINC SULFATE CAP 220 MG (50 MG ELEMENTAL ZN) SCH MG: 220 (50 ZN) CAP at 08:36

## 2020-03-11 RX ADMIN — METRONIDAZOLE SCH MLS/HR: 500 SOLUTION INTRAVENOUS at 05:22

## 2020-03-11 RX ADMIN — METRONIDAZOLE SCH MLS/HR: 500 SOLUTION INTRAVENOUS at 22:05

## 2020-03-11 RX ADMIN — SENNOSIDES SCH TAB: 8.6 TABLET, COATED ORAL at 20:28

## 2020-03-11 RX ADMIN — LEVETIRACETAM SCH MG: 100 SOLUTION ORAL at 08:37

## 2020-03-11 RX ADMIN — CYANOCOBALAMIN TAB 1000 MCG SCH MCG: 1000 TAB at 08:38

## 2020-03-11 RX ADMIN — VANCOMYCIN HYDROCHLORIDE SCH MG: 500 INJECTION, POWDER, LYOPHILIZED, FOR SOLUTION INTRAVENOUS at 11:53

## 2020-03-11 RX ADMIN — VITAMIN D, TAB 1000IU (100/BT) SCH UNIT: 25 TAB at 08:36

## 2020-03-11 RX ADMIN — ASCORBIC ACID TAB 250 MG SCH MG: 250 TAB at 08:37

## 2020-03-11 RX ADMIN — LACTOBACILLUS ACIDOPH-L.BULGARICUS 1 MILLION CELL CHEWABLE TABLET SCH TAB.CHEW: at 08:37

## 2020-03-11 RX ADMIN — ATORVASTATIN CALCIUM SCH MG: 20 TABLET, FILM COATED ORAL at 08:34

## 2020-03-11 RX ADMIN — ASCORBIC ACID TAB 250 MG SCH MG: 250 TAB at 20:29

## 2020-03-11 RX ADMIN — FAMOTIDINE SCH MG: 20 TABLET, FILM COATED ORAL at 08:37

## 2020-03-11 RX ADMIN — LACTOBACILLUS ACIDOPH-L.BULGARICUS 1 MILLION CELL CHEWABLE TABLET SCH TAB.CHEW: at 18:05

## 2020-03-11 RX ADMIN — FERROUS SULFATE TAB 325 MG (65 MG ELEMENTAL FE) SCH MG: 325 (65 FE) TAB at 20:29

## 2020-03-11 RX ADMIN — VANCOMYCIN HYDROCHLORIDE SCH MG: 500 INJECTION, POWDER, LYOPHILIZED, FOR SOLUTION INTRAVENOUS at 18:05

## 2020-03-11 NOTE — NUR
Patient slept t/o the shift. No changes. No acute distress. No events t/o shift. TELE 
remains SR at 84. Trach to vent. Lizarraga draining clear and veronica colored urine. Good urine 
output. PEG patent and intact. Vital signs stable. Safety and comfort measures maintained 
t/o shift. Patient had 2 loose brown BM. All meds given as ordered. All needs met.

## 2020-03-11 NOTE — NUR
PT ON CONT OBREGON VENT WITH TRACH IN PLACE AND SECURED, WITH NO VENT CHANGES MADE, PT ON 
CUURRENT VENT SETTINGS, PT WITH MOSTLY CONTROLLED VENTILATION, NO VENT CHANGES MADE, CHANGED 
HME, ALL VENT ALARMS GOOD, SUCTION PRNJeff LAWRENCEP

-------------------------------------------------------------------------------

Addendum: 03/11/20 at 0308 by BLAKE NASSAR RT

-------------------------------------------------------------------------------

Amended: Links added.

## 2020-03-11 NOTE — NUR
Called epic exchange for critical lab values. Albumin 1.5. Waiting for a call back. Will 
closely monitor.

## 2020-03-11 NOTE — NUR
Received patient in bed, asleep but arousable to touch. HOB elevated. Trach to vent settings 
checked. SR at 77 on monitor. PEG tube dressing clean, patent with no residual. Lizarraga 
draining clear yellow urine. Not in acute distress.

## 2020-03-11 NOTE — NUR
pt rec'd trach'd on olivares vent with full vent support, tolerating settings well no 
distress/sob noted at this time.

## 2020-03-12 ENCOUNTER — HOSPITAL ENCOUNTER (INPATIENT)
Dept: HOSPITAL 12 - SA | Age: 85
LOS: 16 days | Discharge: ANOTHER HOSPITAL | DRG: 951 | End: 2020-03-28
Attending: INTERNAL MEDICINE | Admitting: INTERNAL MEDICINE
Payer: MEDICAID

## 2020-03-12 VITALS — DIASTOLIC BLOOD PRESSURE: 79 MMHG | SYSTOLIC BLOOD PRESSURE: 123 MMHG

## 2020-03-12 VITALS — SYSTOLIC BLOOD PRESSURE: 118 MMHG | DIASTOLIC BLOOD PRESSURE: 80 MMHG

## 2020-03-12 VITALS — SYSTOLIC BLOOD PRESSURE: 124 MMHG | DIASTOLIC BLOOD PRESSURE: 79 MMHG

## 2020-03-12 VITALS — SYSTOLIC BLOOD PRESSURE: 149 MMHG | DIASTOLIC BLOOD PRESSURE: 94 MMHG

## 2020-03-12 VITALS — SYSTOLIC BLOOD PRESSURE: 126 MMHG | DIASTOLIC BLOOD PRESSURE: 81 MMHG

## 2020-03-12 VITALS — WEIGHT: 119 LBS | BODY MASS INDEX: 21.9 KG/M2 | HEIGHT: 62 IN

## 2020-03-12 VITALS — SYSTOLIC BLOOD PRESSURE: 130 MMHG | DIASTOLIC BLOOD PRESSURE: 82 MMHG

## 2020-03-12 VITALS — SYSTOLIC BLOOD PRESSURE: 133 MMHG | DIASTOLIC BLOOD PRESSURE: 80 MMHG

## 2020-03-12 DIAGNOSIS — M48.02: ICD-10-CM

## 2020-03-12 DIAGNOSIS — E78.5: ICD-10-CM

## 2020-03-12 DIAGNOSIS — N81.4: ICD-10-CM

## 2020-03-12 DIAGNOSIS — M81.0: ICD-10-CM

## 2020-03-12 DIAGNOSIS — Z87.01: ICD-10-CM

## 2020-03-12 DIAGNOSIS — I49.1: ICD-10-CM

## 2020-03-12 DIAGNOSIS — I70.0: ICD-10-CM

## 2020-03-12 DIAGNOSIS — E43: ICD-10-CM

## 2020-03-12 DIAGNOSIS — G31.84: ICD-10-CM

## 2020-03-12 DIAGNOSIS — I27.20: ICD-10-CM

## 2020-03-12 DIAGNOSIS — I25.10: ICD-10-CM

## 2020-03-12 DIAGNOSIS — R62.7: ICD-10-CM

## 2020-03-12 DIAGNOSIS — Z93.1: ICD-10-CM

## 2020-03-12 DIAGNOSIS — D64.9: ICD-10-CM

## 2020-03-12 DIAGNOSIS — G92: ICD-10-CM

## 2020-03-12 DIAGNOSIS — M50.30: ICD-10-CM

## 2020-03-12 DIAGNOSIS — Z99.11: ICD-10-CM

## 2020-03-12 DIAGNOSIS — N17.0: ICD-10-CM

## 2020-03-12 DIAGNOSIS — E87.5: ICD-10-CM

## 2020-03-12 DIAGNOSIS — I08.1: ICD-10-CM

## 2020-03-12 DIAGNOSIS — Z93.0: ICD-10-CM

## 2020-03-12 DIAGNOSIS — Z87.440: ICD-10-CM

## 2020-03-12 DIAGNOSIS — F41.9: ICD-10-CM

## 2020-03-12 DIAGNOSIS — M47.812: ICD-10-CM

## 2020-03-12 DIAGNOSIS — E87.0: ICD-10-CM

## 2020-03-12 DIAGNOSIS — J96.22: ICD-10-CM

## 2020-03-12 DIAGNOSIS — G40.909: ICD-10-CM

## 2020-03-12 DIAGNOSIS — I25.5: ICD-10-CM

## 2020-03-12 DIAGNOSIS — F32.9: ICD-10-CM

## 2020-03-12 DIAGNOSIS — J96.21: Primary | ICD-10-CM

## 2020-03-12 DIAGNOSIS — Z90.710: ICD-10-CM

## 2020-03-12 DIAGNOSIS — D68.59: ICD-10-CM

## 2020-03-12 DIAGNOSIS — Z86.718: ICD-10-CM

## 2020-03-12 DIAGNOSIS — G62.9: ICD-10-CM

## 2020-03-12 DIAGNOSIS — I44.0: ICD-10-CM

## 2020-03-12 DIAGNOSIS — R13.10: ICD-10-CM

## 2020-03-12 DIAGNOSIS — I48.0: ICD-10-CM

## 2020-03-12 DIAGNOSIS — L89.154: ICD-10-CM

## 2020-03-12 DIAGNOSIS — I50.42: ICD-10-CM

## 2020-03-12 DIAGNOSIS — I13.0: ICD-10-CM

## 2020-03-12 DIAGNOSIS — N18.4: ICD-10-CM

## 2020-03-12 DIAGNOSIS — I25.2: ICD-10-CM

## 2020-03-12 PROCEDURE — 5A1955Z RESPIRATORY VENTILATION, GREATER THAN 96 CONSECUTIVE HOURS: ICD-10-PCS | Performed by: INTERNAL MEDICINE

## 2020-03-12 PROCEDURE — 0KBP0ZZ EXCISION OF LEFT HIP MUSCLE, OPEN APPROACH: ICD-10-PCS

## 2020-03-12 PROCEDURE — 0KBN0ZZ EXCISION OF RIGHT HIP MUSCLE, OPEN APPROACH: ICD-10-PCS

## 2020-03-12 RX ADMIN — VANCOMYCIN HYDROCHLORIDE SCH MG: 500 INJECTION, POWDER, LYOPHILIZED, FOR SOLUTION INTRAVENOUS at 00:02

## 2020-03-12 RX ADMIN — ZINC SULFATE CAP 220 MG (50 MG ELEMENTAL ZN) SCH MG: 220 (50 ZN) CAP at 08:18

## 2020-03-12 RX ADMIN — FOLIC ACID SCH MG: 1 TABLET ORAL at 08:17

## 2020-03-12 RX ADMIN — FAMOTIDINE SCH MG: 20 TABLET, FILM COATED ORAL at 08:18

## 2020-03-12 RX ADMIN — VANCOMYCIN HYDROCHLORIDE SCH MG: 500 INJECTION, POWDER, LYOPHILIZED, FOR SOLUTION INTRAVENOUS at 11:38

## 2020-03-12 RX ADMIN — ATORVASTATIN CALCIUM SCH MG: 20 TABLET, FILM COATED ORAL at 08:17

## 2020-03-12 RX ADMIN — Medication SCH MG: at 21:00

## 2020-03-12 RX ADMIN — LACTOBACILLUS ACIDOPH-L.BULGARICUS 1 MILLION CELL CHEWABLE TABLET SCH TAB.CHEW: at 21:00

## 2020-03-12 RX ADMIN — LEVETIRACETAM SCH MG: 100 SOLUTION ORAL at 08:16

## 2020-03-12 RX ADMIN — Medication PRN ML: at 04:26

## 2020-03-12 RX ADMIN — VANCOMYCIN HYDROCHLORIDE SCH MG: 500 INJECTION, POWDER, LYOPHILIZED, FOR SOLUTION INTRAVENOUS at 18:16

## 2020-03-12 RX ADMIN — ASCORBIC ACID TAB 250 MG SCH MG: 250 TAB at 20:45

## 2020-03-12 RX ADMIN — LACTOBACILLUS ACIDOPH-L.BULGARICUS 1 MILLION CELL CHEWABLE TABLET SCH TAB.CHEW: at 08:18

## 2020-03-12 RX ADMIN — ZINC SULFATE CAP 220 MG (50 MG ELEMENTAL ZN) SCH MG: 220 (50 ZN) CAP at 21:00

## 2020-03-12 RX ADMIN — THERA TABS SCH UDTAB: TAB at 21:00

## 2020-03-12 RX ADMIN — VANCOMYCIN HYDROCHLORIDE SCH MG: 500 INJECTION, POWDER, LYOPHILIZED, FOR SOLUTION INTRAVENOUS at 05:45

## 2020-03-12 RX ADMIN — ASCORBIC ACID TAB 250 MG SCH MG: 250 TAB at 08:17

## 2020-03-12 RX ADMIN — CYANOCOBALAMIN TAB 1000 MCG SCH MCG: 1000 TAB at 08:17

## 2020-03-12 RX ADMIN — LACTOBACILLUS ACIDOPH-L.BULGARICUS 1 MILLION CELL CHEWABLE TABLET SCH TAB.CHEW: at 18:15

## 2020-03-12 RX ADMIN — ESCITALOPRAM OXALATE SCH MG: 10 TABLET, FILM COATED ORAL at 08:17

## 2020-03-12 RX ADMIN — VITAMIN D, TAB 1000IU (100/BT) SCH UNIT: 25 TAB at 08:17

## 2020-03-12 NOTE — NUR
RESTING COMFORTABLY IN BED WITH VENT SETS AT -30-5 SATURATING 100%, SR AT 90-96/MIN. 
TOLERATING FEEDING AT 50 ML/HR. CONTINUE WITH ISOLATION PRECAUTION  FOR C-DIFF. DC PLANNING 
IN PLACED

## 2020-03-12 NOTE — NUR
Received pt on HT-50 ventilator with the following settings of AC-12, Vt-450, PEEP+5, 
FIO2-2LPM bleed in, trached with Tayaley#8 DCT trach, which is in the place and secure. No 
s/s of respiratory distress noted. Airway care done, pt responded to physical stimuli. HME 
changed. No wall alarm cable. Resus. bag and back up trach at bedside. Vent and alarms 
checked and reset.

## 2020-03-12 NOTE — NUR
NIKITA MCDANIEL IN SACRAL DECUB DEBRIDEMENT DONE AT BEDSIDE UNDER LOCAL ANESTHESIA. PATIENT 
TOLERATED PROCEDURES WELL. MONITORED FOR BLEEDING. REMAINS SR ON MONITOR

## 2020-03-12 NOTE — NUR
Pt rested well in between care; no acute distress; oral and trache care done; 2 loose BM, 
incontinence care done; dressing to sacrum changed; repositioned q2h; tolerated GTF, no 
residual; kept HOB; safety maintained.

## 2020-03-12 NOTE — NUR
TRIED TO REACH ROBERT WEBER FOR WOUND DEBRIDEMENT PER  SHE WILL BE BACK OFFICE 
TOMORROW. MESSAGE LEFT WITH  ([phone])

## 2020-03-13 VITALS — SYSTOLIC BLOOD PRESSURE: 121 MMHG | DIASTOLIC BLOOD PRESSURE: 82 MMHG

## 2020-03-13 VITALS — DIASTOLIC BLOOD PRESSURE: 81 MMHG | SYSTOLIC BLOOD PRESSURE: 129 MMHG

## 2020-03-13 VITALS — SYSTOLIC BLOOD PRESSURE: 122 MMHG | DIASTOLIC BLOOD PRESSURE: 80 MMHG

## 2020-03-13 VITALS — SYSTOLIC BLOOD PRESSURE: 131 MMHG | DIASTOLIC BLOOD PRESSURE: 131 MMHG

## 2020-03-13 RX ADMIN — LEVETIRACETAM SCH MG: 500 TABLET, FILM COATED ORAL at 09:15

## 2020-03-13 RX ADMIN — LEVETIRACETAM SCH MG: 500 TABLET, FILM COATED ORAL at 20:48

## 2020-03-13 RX ADMIN — Medication SCH MG: at 20:48

## 2020-03-13 RX ADMIN — LACTOBACILLUS ACIDOPH-L.BULGARICUS 1 MILLION CELL CHEWABLE TABLET SCH TAB.CHEW: at 20:48

## 2020-03-13 RX ADMIN — SODIUM HYPOCHLORITE SCH ML: 1.25 SOLUTION TOPICAL at 09:15

## 2020-03-13 RX ADMIN — ASCORBIC ACID TAB 250 MG SCH MG: 250 TAB at 09:15

## 2020-03-13 RX ADMIN — Medication SCH ML: at 09:15

## 2020-03-13 RX ADMIN — LACTOBACILLUS ACIDOPH-L.BULGARICUS 1 MILLION CELL CHEWABLE TABLET SCH TAB.CHEW: at 09:15

## 2020-03-13 RX ADMIN — ZINC SULFATE CAP 220 MG (50 MG ELEMENTAL ZN) SCH MG: 220 (50 ZN) CAP at 20:49

## 2020-03-13 RX ADMIN — Medication PRN ML: at 18:58

## 2020-03-13 RX ADMIN — SENNOSIDES SCH TAB: 8.6 TABLET, COATED ORAL at 21:00

## 2020-03-13 RX ADMIN — Medication SCH ML: at 21:54

## 2020-03-13 RX ADMIN — VANCOMYCIN HYDROCHLORIDE SCH MG: 500 INJECTION, POWDER, LYOPHILIZED, FOR SOLUTION INTRAVENOUS at 05:03

## 2020-03-13 RX ADMIN — ASCORBIC ACID TAB 250 MG SCH MG: 250 TAB at 20:48

## 2020-03-13 RX ADMIN — METRONIDAZOLE SCH MLS/HR: 500 SOLUTION INTRAVENOUS at 16:09

## 2020-03-13 RX ADMIN — VANCOMYCIN HYDROCHLORIDE SCH MG: 500 INJECTION, POWDER, LYOPHILIZED, FOR SOLUTION INTRAVENOUS at 00:14

## 2020-03-13 RX ADMIN — VANCOMYCIN HYDROCHLORIDE SCH MG: 500 INJECTION, POWDER, LYOPHILIZED, FOR SOLUTION INTRAVENOUS at 11:53

## 2020-03-13 RX ADMIN — METRONIDAZOLE SCH MLS/HR: 500 SOLUTION INTRAVENOUS at 21:54

## 2020-03-13 RX ADMIN — FAMOTIDINE SCH MG: 20 TABLET, FILM COATED ORAL at 05:03

## 2020-03-13 RX ADMIN — THERA TABS SCH UDTAB: TAB at 20:49

## 2020-03-13 RX ADMIN — VANCOMYCIN HYDROCHLORIDE SCH MG: 500 INJECTION, POWDER, LYOPHILIZED, FOR SOLUTION INTRAVENOUS at 17:32

## 2020-03-13 NOTE — NUR
Initial  Assessment:



Patient is an 84 year old female, admitted to [facility]  [facility] was previously living at [facility].  Patient was admitted to [facility] on 02/16/2020 for pneumonia, requiring a ventilator.  Patient was then discharged 
to [facility] on 03/05/2020 for a tracheostomy due to risk of 
pulmonary hypertension.  Patient was then re-admitted to [facility] on 03/06/2020 s/p 
tracheostomy.   Patient is not alert.  Patient is not communicative.  SW is unable to gather 
any personal/medical information from the patient.  Some psychosocial information gathered 
from patient's records and patient's attending physician Dr. Dwyer, who has been patient's 
physician at the SNF patient was residing at.  Patient is born and raised in [place] [facility] [place] [facility].  
According to Dr. Dwyer, patient had previously reported to him that patient does not have any 
family members.  Patient has been dependent with all ADL's.  Patient does not have an 
Advance Directive, nor conservatorship.  Patient's code status if a Full Code. 

SW is unable to obtain patient's signatures on all the admission paperwork due to patient 
not being alert, being non-communicative and unable to follow commands.

## 2020-03-13 NOTE — NUR
Pt admitted from [facility] with diagnosis of respiratory failure, pt in vegetative 
state,  no spontaneous eye opening, no verbal response. BUE and BLE swelling, elevated with 
pillow.  Gtube clean without discharge. HOB elevated, feedings tolerated without 
residuals/vomiting.  PICC line with triple lumen on LUCRETIA patent. Ventilator set up by RT. 
Trach stoma with dressing and trach collar with noticeable stay sutures.  Large unsteageble 
sacral wound dressing changed.  Lizarraga catheter patent draining veronica colored urine by 
gravity. Contact Cdiff isolation observed.  Admission orders verified from Dr. Dwyer. Needs 
attended.

## 2020-03-13 NOTE — NUR
RECEIVED PT ON CONTINUOUS VENT. TRACH IN PLACED AND SECURED WITH TRACH TIE. BACK UP TRACH 
AND AMBU BAG AT BEDSIDE. SUCTION PRN. TRACH CARE DONE. VENT CHECKED, ALARMS WORKING WELL AND 
AUDIBLE. NO DISTRESS NOTED AT THIS TIME. WILL CONTINUE TO MONITOR. PPE USED.

## 2020-03-14 VITALS — SYSTOLIC BLOOD PRESSURE: 127 MMHG | DIASTOLIC BLOOD PRESSURE: 82 MMHG

## 2020-03-14 VITALS — SYSTOLIC BLOOD PRESSURE: 127 MMHG | DIASTOLIC BLOOD PRESSURE: 86 MMHG

## 2020-03-14 VITALS — SYSTOLIC BLOOD PRESSURE: 121 MMHG | DIASTOLIC BLOOD PRESSURE: 85 MMHG

## 2020-03-14 VITALS — SYSTOLIC BLOOD PRESSURE: 132 MMHG | DIASTOLIC BLOOD PRESSURE: 84 MMHG

## 2020-03-14 VITALS — DIASTOLIC BLOOD PRESSURE: 80 MMHG | SYSTOLIC BLOOD PRESSURE: 120 MMHG

## 2020-03-14 VITALS — SYSTOLIC BLOOD PRESSURE: 120 MMHG | DIASTOLIC BLOOD PRESSURE: 80 MMHG

## 2020-03-14 RX ADMIN — METRONIDAZOLE SCH MLS/HR: 500 SOLUTION INTRAVENOUS at 05:58

## 2020-03-14 RX ADMIN — ZINC SULFATE CAP 220 MG (50 MG ELEMENTAL ZN) SCH MG: 220 (50 ZN) CAP at 21:55

## 2020-03-14 RX ADMIN — ASCORBIC ACID TAB 250 MG SCH MG: 250 TAB at 20:45

## 2020-03-14 RX ADMIN — THERA TABS SCH UDTAB: TAB at 21:55

## 2020-03-14 RX ADMIN — VANCOMYCIN HYDROCHLORIDE SCH MG: 500 INJECTION, POWDER, LYOPHILIZED, FOR SOLUTION INTRAVENOUS at 12:28

## 2020-03-14 RX ADMIN — Medication SCH ML: at 09:00

## 2020-03-14 RX ADMIN — METRONIDAZOLE SCH MLS/HR: 500 SOLUTION INTRAVENOUS at 14:59

## 2020-03-14 RX ADMIN — FAMOTIDINE SCH MG: 20 TABLET, FILM COATED ORAL at 05:44

## 2020-03-14 RX ADMIN — LACTOBACILLUS ACIDOPH-L.BULGARICUS 1 MILLION CELL CHEWABLE TABLET SCH TAB.CHEW: at 09:19

## 2020-03-14 RX ADMIN — LEVETIRACETAM SCH MG: 500 TABLET, FILM COATED ORAL at 21:54

## 2020-03-14 RX ADMIN — Medication PRN ML: at 15:30

## 2020-03-14 RX ADMIN — METRONIDAZOLE SCH MLS/HR: 500 SOLUTION INTRAVENOUS at 22:07

## 2020-03-14 RX ADMIN — LACTOBACILLUS ACIDOPH-L.BULGARICUS 1 MILLION CELL CHEWABLE TABLET SCH TAB.CHEW: at 21:54

## 2020-03-14 RX ADMIN — VANCOMYCIN HYDROCHLORIDE SCH MG: 500 INJECTION, POWDER, LYOPHILIZED, FOR SOLUTION INTRAVENOUS at 00:17

## 2020-03-14 RX ADMIN — Medication SCH MG: at 21:54

## 2020-03-14 RX ADMIN — VANCOMYCIN HYDROCHLORIDE SCH MG: 500 INJECTION, POWDER, LYOPHILIZED, FOR SOLUTION INTRAVENOUS at 05:44

## 2020-03-14 RX ADMIN — ASCORBIC ACID TAB 250 MG SCH MG: 250 TAB at 09:18

## 2020-03-14 RX ADMIN — SENNOSIDES SCH TAB: 8.6 TABLET, COATED ORAL at 21:00

## 2020-03-14 RX ADMIN — SODIUM HYPOCHLORITE SCH ML: 1.25 SOLUTION TOPICAL at 09:30

## 2020-03-14 RX ADMIN — VANCOMYCIN HYDROCHLORIDE SCH MG: 500 INJECTION, POWDER, LYOPHILIZED, FOR SOLUTION INTRAVENOUS at 17:24

## 2020-03-14 RX ADMIN — Medication SCH ML: at 21:17

## 2020-03-14 RX ADMIN — ATORVASTATIN CALCIUM SCH MG: 20 TABLET, FILM COATED ORAL at 21:54

## 2020-03-14 RX ADMIN — LEVETIRACETAM SCH MG: 500 TABLET, FILM COATED ORAL at 09:29

## 2020-03-14 NOTE — NUR
Afebrile, trach is intact and connected to vent, no respiratory distress noted. On IV  
Flagyl and Vancomycin via GT for c-diff in the stool, on contact isolation precaution, still 
with on and off loose stools, good nayely care rendered,  kapoor catheter draining well  to 
yellow urine, with ongoing treatment to sacral wound, turned and repositioned, kept clean 
and comfortable.

## 2020-03-14 NOTE — NUR
Pt is afebrile temp 98,continue on ivatb ,Flagyl 500 mg ivpb and vancomycin 250 mg via Gt,no 
adverse reaction noted.

## 2020-03-14 NOTE — NUR
continue on flagyl 500mg iv and vancomycin 250mg via gtube for c.diff. no adverse reaction 
noted, continue on contact isolation, right upper arm picc line patent, flushed per 
protocol, no respiratory distress noted, afebrile.

## 2020-03-15 VITALS — SYSTOLIC BLOOD PRESSURE: 122 MMHG | DIASTOLIC BLOOD PRESSURE: 82 MMHG

## 2020-03-15 VITALS — DIASTOLIC BLOOD PRESSURE: 80 MMHG | SYSTOLIC BLOOD PRESSURE: 119 MMHG

## 2020-03-15 VITALS — SYSTOLIC BLOOD PRESSURE: 120 MMHG | DIASTOLIC BLOOD PRESSURE: 84 MMHG

## 2020-03-15 VITALS — SYSTOLIC BLOOD PRESSURE: 110 MMHG | DIASTOLIC BLOOD PRESSURE: 76 MMHG

## 2020-03-15 VITALS — DIASTOLIC BLOOD PRESSURE: 83 MMHG | SYSTOLIC BLOOD PRESSURE: 121 MMHG

## 2020-03-15 VITALS — DIASTOLIC BLOOD PRESSURE: 90 MMHG | SYSTOLIC BLOOD PRESSURE: 130 MMHG

## 2020-03-15 LAB
ALP SERPL-CCNC: 70 U/L (ref 50–136)
ALT SERPL W/O P-5'-P-CCNC: 8 U/L (ref 14–59)
AST SERPL-CCNC: 14 U/L (ref 15–37)
BASOPHILS # BLD AUTO: 0 K/UL (ref 0–8)
BASOPHILS NFR BLD AUTO: 0.1 % (ref 0–2)
BILIRUB SERPL-MCNC: 0.3 MG/DL (ref 0.2–1)
BUN SERPL-MCNC: 60 MG/DL (ref 7–18)
CHLORIDE SERPL-SCNC: 111 MMOL/L (ref 98–107)
CO2 SERPL-SCNC: 20 MMOL/L (ref 21–32)
CREAT SERPL-MCNC: 1.7 MG/DL (ref 0.6–1.3)
EOSINOPHIL # BLD AUTO: 0 K/UL (ref 0–0.7)
EOSINOPHIL NFR BLD AUTO: 0.3 % (ref 0–7)
GLUCOSE SERPL-MCNC: 141 MG/DL (ref 74–106)
HCT VFR BLD AUTO: 24.3 % (ref 31.2–41.9)
HGB BLD-MCNC: 7.9 G/DL (ref 10.9–14.3)
LYMPHOCYTES # BLD AUTO: 0.7 K/UL (ref 20–40)
LYMPHOCYTES NFR BLD AUTO: 7.4 % (ref 20.5–51.5)
MAGNESIUM SERPL-MCNC: 1.9 MG/DL (ref 1.8–2.4)
MCH RBC QN AUTO: 31.3 UUG (ref 24.7–32.8)
MCHC RBC AUTO-ENTMCNC: 33 G/DL (ref 32.3–35.6)
MCV RBC AUTO: 96.4 FL (ref 75.5–95.3)
MONOCYTES # BLD AUTO: 0.5 K/UL (ref 2–10)
MONOCYTES NFR BLD AUTO: 4.9 % (ref 0–11)
NEUTROPHILS # BLD AUTO: 8.6 K/UL (ref 1.8–8.9)
NEUTROPHILS NFR BLD AUTO: 87.3 % (ref 38.5–71.5)
PHOSPHATE SERPL-MCNC: 4.1 MG/DL (ref 2.5–4.9)
PLATELET # BLD AUTO: 281 K/UL (ref 179–408)
POTASSIUM SERPL-SCNC: 5 MMOL/L (ref 3.5–5.1)
RBC # BLD AUTO: 2.53 MIL/UL (ref 3.63–4.92)
WBC # BLD AUTO: 9.8 K/UL (ref 3.8–11.8)
WS STN SPEC: 5.9 G/DL (ref 6.4–8.2)

## 2020-03-15 RX ADMIN — HYDROCODONE BITARTRATE AND ACETAMINOPHEN PRN TAB: 5; 325 TABLET ORAL at 04:44

## 2020-03-15 RX ADMIN — LACTOBACILLUS ACIDOPH-L.BULGARICUS 1 MILLION CELL CHEWABLE TABLET SCH TAB.CHEW: at 08:43

## 2020-03-15 RX ADMIN — METRONIDAZOLE SCH MLS/HR: 500 SOLUTION INTRAVENOUS at 06:37

## 2020-03-15 RX ADMIN — METRONIDAZOLE SCH MLS/HR: 500 SOLUTION INTRAVENOUS at 22:18

## 2020-03-15 RX ADMIN — SODIUM HYPOCHLORITE SCH ML: 1.25 SOLUTION TOPICAL at 08:55

## 2020-03-15 RX ADMIN — SENNOSIDES SCH TAB: 8.6 TABLET, COATED ORAL at 21:59

## 2020-03-15 RX ADMIN — LEVETIRACETAM SCH MG: 500 TABLET, FILM COATED ORAL at 21:59

## 2020-03-15 RX ADMIN — Medication SCH ML: at 21:00

## 2020-03-15 RX ADMIN — LACTOBACILLUS ACIDOPH-L.BULGARICUS 1 MILLION CELL CHEWABLE TABLET SCH TAB.CHEW: at 21:58

## 2020-03-15 RX ADMIN — ASCORBIC ACID TAB 250 MG SCH MG: 250 TAB at 08:43

## 2020-03-15 RX ADMIN — ASCORBIC ACID TAB 250 MG SCH MG: 250 TAB at 20:45

## 2020-03-15 RX ADMIN — METRONIDAZOLE SCH MLS/HR: 500 SOLUTION INTRAVENOUS at 13:51

## 2020-03-15 RX ADMIN — FAMOTIDINE SCH MG: 20 TABLET, FILM COATED ORAL at 06:00

## 2020-03-15 RX ADMIN — ZINC SULFATE CAP 220 MG (50 MG ELEMENTAL ZN) SCH MG: 220 (50 ZN) CAP at 21:00

## 2020-03-15 RX ADMIN — Medication SCH MG: at 21:58

## 2020-03-15 RX ADMIN — THERA TABS SCH UDTAB: TAB at 21:00

## 2020-03-15 RX ADMIN — VANCOMYCIN HYDROCHLORIDE SCH MG: 500 INJECTION, POWDER, LYOPHILIZED, FOR SOLUTION INTRAVENOUS at 17:46

## 2020-03-15 RX ADMIN — Medication SCH ML: at 09:00

## 2020-03-15 RX ADMIN — ATORVASTATIN CALCIUM SCH MG: 20 TABLET, FILM COATED ORAL at 21:59

## 2020-03-15 RX ADMIN — Medication PRN ML: at 12:32

## 2020-03-15 RX ADMIN — VANCOMYCIN HYDROCHLORIDE SCH MG: 500 INJECTION, POWDER, LYOPHILIZED, FOR SOLUTION INTRAVENOUS at 06:00

## 2020-03-15 RX ADMIN — LEVETIRACETAM SCH MG: 500 TABLET, FILM COATED ORAL at 08:43

## 2020-03-15 RX ADMIN — VANCOMYCIN HYDROCHLORIDE SCH MG: 500 INJECTION, POWDER, LYOPHILIZED, FOR SOLUTION INTRAVENOUS at 00:25

## 2020-03-15 RX ADMIN — VANCOMYCIN HYDROCHLORIDE SCH MG: 500 INJECTION, POWDER, LYOPHILIZED, FOR SOLUTION INTRAVENOUS at 12:15

## 2020-03-15 NOTE — NUR
Afebrile, temperature is 98.0 Fahrenheit, trach is intact and patent, connected to vent, 02 
sat is 100%, no respiratory distress noted, On Flagyl IV  and Vancomycin via GT for c-diff, 
no adverse reactions noted, on isolation precaution, LUCRETIA PICC is intact, elevated BUE on 
pillows for edema. Still with loose stools, good nayely care rendered, kapoor catheter is 
draining well to yellow urine, kept clean and comfortable.

## 2020-03-15 NOTE — NUR
afebrile temp 97.4,has trach in place connected to ventilator as prescribed setting 
,suctioned as needed,continue on flagyl IVATB and vancomycin via GT for c diff,no adverse 
reaction noted,continue on isolation precaution,no loose bm noted yesterday and today,picc 
line on the r upper arm intact,no redness o s/s of infection noted.Sacral wound tx done as 
ordered.

## 2020-03-16 VITALS — SYSTOLIC BLOOD PRESSURE: 134 MMHG | DIASTOLIC BLOOD PRESSURE: 91 MMHG

## 2020-03-16 VITALS — SYSTOLIC BLOOD PRESSURE: 115 MMHG | DIASTOLIC BLOOD PRESSURE: 79 MMHG

## 2020-03-16 VITALS — SYSTOLIC BLOOD PRESSURE: 122 MMHG | DIASTOLIC BLOOD PRESSURE: 78 MMHG

## 2020-03-16 VITALS — DIASTOLIC BLOOD PRESSURE: 84 MMHG | SYSTOLIC BLOOD PRESSURE: 120 MMHG

## 2020-03-16 RX ADMIN — VANCOMYCIN HYDROCHLORIDE SCH MG: 500 INJECTION, POWDER, LYOPHILIZED, FOR SOLUTION INTRAVENOUS at 12:00

## 2020-03-16 RX ADMIN — METRONIDAZOLE SCH MLS/HR: 500 SOLUTION INTRAVENOUS at 22:03

## 2020-03-16 RX ADMIN — Medication SCH ML: at 08:38

## 2020-03-16 RX ADMIN — ZINC SULFATE CAP 220 MG (50 MG ELEMENTAL ZN) SCH MG: 220 (50 ZN) CAP at 21:35

## 2020-03-16 RX ADMIN — Medication SCH ML: at 08:34

## 2020-03-16 RX ADMIN — METRONIDAZOLE SCH MLS/HR: 500 SOLUTION INTRAVENOUS at 14:17

## 2020-03-16 RX ADMIN — Medication SCH ML: at 12:00

## 2020-03-16 RX ADMIN — VANCOMYCIN HYDROCHLORIDE SCH MG: 500 INJECTION, POWDER, LYOPHILIZED, FOR SOLUTION INTRAVENOUS at 05:08

## 2020-03-16 RX ADMIN — Medication SCH ML: at 13:44

## 2020-03-16 RX ADMIN — Medication SCH GM: at 05:08

## 2020-03-16 RX ADMIN — LEVETIRACETAM SCH MG: 500 TABLET, FILM COATED ORAL at 21:34

## 2020-03-16 RX ADMIN — Medication SCH MG: at 21:33

## 2020-03-16 RX ADMIN — SENNOSIDES SCH TAB: 8.6 TABLET, COATED ORAL at 21:00

## 2020-03-16 RX ADMIN — METRONIDAZOLE SCH MLS/HR: 500 SOLUTION INTRAVENOUS at 05:50

## 2020-03-16 RX ADMIN — VANCOMYCIN HYDROCHLORIDE SCH MG: 500 INJECTION, POWDER, LYOPHILIZED, FOR SOLUTION INTRAVENOUS at 00:00

## 2020-03-16 RX ADMIN — VANCOMYCIN HYDROCHLORIDE SCH MG: 500 INJECTION, POWDER, LYOPHILIZED, FOR SOLUTION INTRAVENOUS at 17:42

## 2020-03-16 RX ADMIN — FAMOTIDINE SCH MG: 20 TABLET, FILM COATED ORAL at 05:08

## 2020-03-16 RX ADMIN — Medication PRN ML: at 05:08

## 2020-03-16 RX ADMIN — Medication SCH GM: at 08:34

## 2020-03-16 RX ADMIN — Medication SCH EACH: at 17:42

## 2020-03-16 RX ADMIN — SODIUM HYPOCHLORITE SCH ML: 1.25 SOLUTION TOPICAL at 08:34

## 2020-03-16 RX ADMIN — Medication SCH GM: at 21:35

## 2020-03-16 RX ADMIN — ASCORBIC ACID TAB 250 MG SCH MG: 250 TAB at 21:33

## 2020-03-16 RX ADMIN — LACTOBACILLUS ACIDOPH-L.BULGARICUS 1 MILLION CELL CHEWABLE TABLET SCH TAB.CHEW: at 08:34

## 2020-03-16 RX ADMIN — LACTOBACILLUS ACIDOPH-L.BULGARICUS 1 MILLION CELL CHEWABLE TABLET SCH TAB.CHEW: at 21:33

## 2020-03-16 RX ADMIN — LEVETIRACETAM SCH MG: 500 TABLET, FILM COATED ORAL at 08:34

## 2020-03-16 RX ADMIN — THERA TABS SCH UDTAB: TAB at 21:34

## 2020-03-16 RX ADMIN — ATORVASTATIN CALCIUM SCH MG: 20 TABLET, FILM COATED ORAL at 21:34

## 2020-03-16 RX ADMIN — Medication SCH ML: at 21:35

## 2020-03-16 RX ADMIN — ASCORBIC ACID TAB 250 MG SCH MG: 250 TAB at 08:34

## 2020-03-16 NOTE — NUR
WOUND CARE CONSULT   WOUND CARE RECEIVED CONSULT FOR NEW ADMISSION/SACRAL WOUND.  WOUND CARE 
WILL DEFER CONSULT AND TREATMENT PLANS TO PLASTIC SURGICAL TEAM WOUND ARE CURRENTLY 
FOLLOWING THIS PATIENT.  ALL PRESSURE ULCER PREVENTION MEASURES ARE NOTED TO BE IN PLACE.  
WILL SEE PRN.

## 2020-03-16 NOTE — NUR
Temperature is 98.1, Trach is intact and patent, connected to vent, 02 sat is 100%, no signs 
of any respiratory distress noted, LUCRETIA PICC is intact, on Flagyl IV anf vancomycin via gt 
for c-diff, still with on sand off watery loose stools, kapoor catheter is draining well to 
yellow urine output, with ongoing treatment to sacral wound, good nayely care rendered, 
elevated extremities on pillows d/t edema, turned and repositioned, kept clean and 
comfortable.

## 2020-03-17 VITALS — DIASTOLIC BLOOD PRESSURE: 88 MMHG | SYSTOLIC BLOOD PRESSURE: 141 MMHG

## 2020-03-17 LAB
BASOPHILS # BLD AUTO: 0 K/UL (ref 0–8)
BASOPHILS NFR BLD AUTO: 0.2 % (ref 0–2)
BUN SERPL-MCNC: 88 MG/DL (ref 7–18)
CHLORIDE SERPL-SCNC: 109 MMOL/L (ref 98–107)
CO2 SERPL-SCNC: 19 MMOL/L (ref 21–32)
CREAT SERPL-MCNC: 1.7 MG/DL (ref 0.6–1.3)
EOSINOPHIL # BLD AUTO: 0 K/UL (ref 0–0.7)
EOSINOPHIL NFR BLD AUTO: 0 % (ref 0–7)
FERRITIN SERPL-MCNC: 664 NG/ML (ref 8–252)
GLUCOSE SERPL-MCNC: 117 MG/DL (ref 74–106)
HCT VFR BLD AUTO: 26.6 % (ref 31.2–41.9)
HGB BLD-MCNC: 8.5 G/DL (ref 10.9–14.3)
IRON SERPL-MCNC: 27 UG/DL (ref 50–175)
LYMPHOCYTES # BLD AUTO: 0.7 K/UL (ref 20–40)
LYMPHOCYTES NFR BLD AUTO: 5 % (ref 20.5–51.5)
MAGNESIUM SERPL-MCNC: 2.2 MG/DL (ref 1.8–2.4)
MCH RBC QN AUTO: 30.9 UUG (ref 24.7–32.8)
MCHC RBC AUTO-ENTMCNC: 32 G/DL (ref 32.3–35.6)
MCV RBC AUTO: 96.4 FL (ref 75.5–95.3)
MONOCYTES # BLD AUTO: 0.5 K/UL (ref 2–10)
MONOCYTES NFR BLD AUTO: 3.6 % (ref 0–11)
NEUTROPHILS # BLD AUTO: 12.9 K/UL (ref 1.8–8.9)
NEUTROPHILS NFR BLD AUTO: 91.2 % (ref 38.5–71.5)
PHOSPHATE SERPL-MCNC: 4.2 MG/DL (ref 2.5–4.9)
PLATELET # BLD AUTO: 251 K/UL (ref 179–408)
POTASSIUM SERPL-SCNC: 5.5 MMOL/L (ref 3.5–5.1)
RBC # BLD AUTO: 2.76 MIL/UL (ref 3.63–4.92)
WBC # BLD AUTO: 14.1 K/UL (ref 3.8–11.8)

## 2020-03-17 RX ADMIN — Medication SCH ML: at 09:00

## 2020-03-17 RX ADMIN — LACTOBACILLUS ACIDOPH-L.BULGARICUS 1 MILLION CELL CHEWABLE TABLET SCH TAB.CHEW: at 08:33

## 2020-03-17 RX ADMIN — VANCOMYCIN HYDROCHLORIDE SCH MG: 500 INJECTION, POWDER, LYOPHILIZED, FOR SOLUTION INTRAVENOUS at 11:59

## 2020-03-17 RX ADMIN — Medication SCH EACH: at 05:26

## 2020-03-17 RX ADMIN — VANCOMYCIN HYDROCHLORIDE SCH MG: 500 INJECTION, POWDER, LYOPHILIZED, FOR SOLUTION INTRAVENOUS at 18:07

## 2020-03-17 RX ADMIN — Medication SCH MG: at 21:59

## 2020-03-17 RX ADMIN — Medication SCH EACH: at 18:07

## 2020-03-17 RX ADMIN — ATORVASTATIN CALCIUM SCH MG: 20 TABLET, FILM COATED ORAL at 21:59

## 2020-03-17 RX ADMIN — LEVETIRACETAM SCH MG: 500 TABLET, FILM COATED ORAL at 21:59

## 2020-03-17 RX ADMIN — ASCORBIC ACID TAB 250 MG SCH MG: 250 TAB at 08:33

## 2020-03-17 RX ADMIN — Medication PRN ML: at 22:23

## 2020-03-17 RX ADMIN — Medication SCH ML: at 21:00

## 2020-03-17 RX ADMIN — ASCORBIC ACID TAB 250 MG SCH MG: 250 TAB at 20:45

## 2020-03-17 RX ADMIN — THERA TABS SCH UDTAB: TAB at 21:59

## 2020-03-17 RX ADMIN — LACTOBACILLUS ACIDOPH-L.BULGARICUS 1 MILLION CELL CHEWABLE TABLET SCH TAB.CHEW: at 21:59

## 2020-03-17 RX ADMIN — FAMOTIDINE SCH MG: 20 TABLET, FILM COATED ORAL at 05:26

## 2020-03-17 RX ADMIN — Medication SCH ML: at 14:00

## 2020-03-17 RX ADMIN — Medication SCH ML: at 21:59

## 2020-03-17 RX ADMIN — ZINC SULFATE CAP 220 MG (50 MG ELEMENTAL ZN) SCH MG: 220 (50 ZN) CAP at 21:59

## 2020-03-17 RX ADMIN — METRONIDAZOLE SCH MLS/HR: 500 SOLUTION INTRAVENOUS at 05:55

## 2020-03-17 RX ADMIN — METRONIDAZOLE SCH MLS/HR: 500 SOLUTION INTRAVENOUS at 22:04

## 2020-03-17 RX ADMIN — Medication SCH GM: at 08:33

## 2020-03-17 RX ADMIN — Medication SCH GM: at 21:59

## 2020-03-17 RX ADMIN — Medication SCH ML: at 05:26

## 2020-03-17 RX ADMIN — METRONIDAZOLE SCH MLS/HR: 500 SOLUTION INTRAVENOUS at 14:47

## 2020-03-17 RX ADMIN — Medication PRN ML: at 05:26

## 2020-03-17 RX ADMIN — VANCOMYCIN HYDROCHLORIDE SCH MG: 500 INJECTION, POWDER, LYOPHILIZED, FOR SOLUTION INTRAVENOUS at 00:50

## 2020-03-17 RX ADMIN — SODIUM HYPOCHLORITE SCH ML: 1.25 SOLUTION TOPICAL at 08:33

## 2020-03-17 RX ADMIN — VANCOMYCIN HYDROCHLORIDE SCH MG: 500 INJECTION, POWDER, LYOPHILIZED, FOR SOLUTION INTRAVENOUS at 05:26

## 2020-03-17 RX ADMIN — LEVETIRACETAM SCH MG: 500 TABLET, FILM COATED ORAL at 08:33

## 2020-03-17 NOTE — NUR
PT REC'D ON OBREGON VENT TOLERATING CURRENT VENT SETTINGS WELL, NO DISTRESS NOTED GIVEN IN AM 
REPORT. PT VENT'D VIA TRACHEOSTOMY, TUBE IN PLACE PATENT AND SECURE WITH TRACH TIE. SXN'ING 
AS NEEDED. VENT ALARMS AUDIBLE, CHECKED AND RESET. BVM AND BACK-UP TRACH AT BEDSIDE.

-------------------------------------------------------------------------------

Addendum: 03/17/20 at 1639 by BARBARA SKY RT

-------------------------------------------------------------------------------

PT IS ON HT-50 VENT

## 2020-03-17 NOTE — NUR
Pharmacy Initial/Admission Medication Review 3/17/20



Patient originally from a SNF who was originally seen in ER due to reports of being 
lethargic with SOB and fever of 102. Pt has multiple chronic conditions including h/o CHF, 
NSTEMIs, afib, HTN, CKD, chronic anemia, dementia, amongst others. While in CCU, pt was 
treated with ongoing IV abx for sepsis, PNA but ended with prolonged stay d/t continued 
infection treatment and complications including c diff colitis. Pt did not significantly 
improve however and eventually required tracheostomy and GT placement with plans for 
transfer to subacute unit. Pt now in subacute completing c diff treatment and is being 
monitored off other abxs for now. 



VS:

Temp 98.1



/79



LABS: (from 3/15/20)

Wbc 9.8     H/H 7.9/24.3       Plt 281

Na 138       K 5.0     Cl 111     CO2 20    BUN/SCr 60/1.7

       Ca 7.1 (corrected Ca 9.2)  phos 4.1   Mg 1.9     



MEDICATION USE REVIEW:

> Pt is not any anti-psych medications

> Pt on Keppra 500mg q12hr since admit 3/12/20. Latest CrCl 21.4 ml/min, ok dose per renal 
function. No seizures noted

> Admitted on famotidine 20mg daily, Rx rec for renal dose to 10mg daily. Md agreed and 
changed. Last CrCl 21.4 ml/min, ok dose per renal function



> PRN MED USAGE: (since admit)

     Tylenol for mild pain used x0

     Norco for severe pain used x1



OTHER ORDERS REVIEWED:

> Continues from medsurg on vanco PO 250mg GT q6hr + flagyl 500mg q8hr for c diff (stop date 
3/18, 3/20 respectively)

> Pt continue lipitor 20mg qhs, no issues noted

> On supplements: ferrous sulfate 330mg qhs, MVI daily, prostat liquid q8hr, nutrisource 
fiber 4gm q12hr, floranex q12hr

> Per dietary rec for wound care, also now on supplements: vit c 250mg q12hr, zinc 220mg 
qhs, Luc q12hr

> Continues on tube feed Glucerna 1.2 @60ml/hr x 20hrs



> Rx rec for d/c of pt's senna 2 tabs qhs scheduled daily and bisacodyl suppository PRN d/t 
reported continued diarrhea and c diff resolution. MD agreed and ordered to d/c

> Rx rec for renal adjustment of famotidine 20mg daily to famotidine 10mg daily as last CrCl 
21.4 ml/min. MD agreed and ordered to d/c



All medications reviewed, please see above for rx recommendations and responses. No further 
recs at this time, pt remains otherwise stable since transfer. Will continue to follow and 
report to next IDT

## 2020-03-17 NOTE — NUR
No acute respiratory distress ,no increase secretions,afebrile temperature 97.4,Continue on 
Flagyl ivatb and vancomycin via gt,no adverse reaction noted,seen by Dr Bailon aware of the 
increase WBC from 9.8 to 14.1,no new orders noted.

## 2020-03-18 VITALS — DIASTOLIC BLOOD PRESSURE: 77 MMHG | SYSTOLIC BLOOD PRESSURE: 130 MMHG

## 2020-03-18 VITALS — DIASTOLIC BLOOD PRESSURE: 75 MMHG | SYSTOLIC BLOOD PRESSURE: 121 MMHG

## 2020-03-18 LAB
BASOPHILS # BLD AUTO: 0.1 K/UL (ref 0–8)
BASOPHILS NFR BLD AUTO: 0.8 % (ref 0–2)
BUN SERPL-MCNC: 100 MG/DL (ref 7–18)
CHLORIDE SERPL-SCNC: 110 MMOL/L (ref 98–107)
CO2 SERPL-SCNC: 18 MMOL/L (ref 21–32)
CREAT SERPL-MCNC: 1.8 MG/DL (ref 0.6–1.3)
EOSINOPHIL # BLD AUTO: 0.1 K/UL (ref 0–0.7)
EOSINOPHIL NFR BLD AUTO: 0.4 % (ref 0–7)
GLUCOSE SERPL-MCNC: 162 MG/DL (ref 74–106)
HCT VFR BLD AUTO: 26.9 % (ref 31.2–41.9)
HGB BLD-MCNC: 8.4 G/DL (ref 10.9–14.3)
LYMPHOCYTES # BLD AUTO: 0.5 K/UL (ref 20–40)
LYMPHOCYTES NFR BLD AUTO: 3.5 % (ref 20.5–51.5)
MAGNESIUM SERPL-MCNC: 2 MG/DL (ref 1.8–2.4)
MCH RBC QN AUTO: 30.9 UUG (ref 24.7–32.8)
MCHC RBC AUTO-ENTMCNC: 31 G/DL (ref 32.3–35.6)
MCV RBC AUTO: 98.5 FL (ref 75.5–95.3)
MONOCYTES # BLD AUTO: 0.5 K/UL (ref 2–10)
MONOCYTES NFR BLD AUTO: 3.6 % (ref 0–11)
NEUTROPHILS # BLD AUTO: 12.8 K/UL (ref 1.8–8.9)
NEUTROPHILS NFR BLD AUTO: 91.7 % (ref 38.5–71.5)
PHOSPHATE SERPL-MCNC: 4 MG/DL (ref 2.5–4.9)
PLATELET # BLD AUTO: 228 K/UL (ref 179–408)
POTASSIUM SERPL-SCNC: 4.5 MMOL/L (ref 3.5–5.1)
RBC # BLD AUTO: 2.73 MIL/UL (ref 3.63–4.92)
WBC # BLD AUTO: 13.9 K/UL (ref 3.8–11.8)

## 2020-03-18 RX ADMIN — Medication SCH ML: at 05:17

## 2020-03-18 RX ADMIN — Medication SCH ML: at 21:56

## 2020-03-18 RX ADMIN — ASCORBIC ACID TAB 250 MG SCH MG: 250 TAB at 08:29

## 2020-03-18 RX ADMIN — VANCOMYCIN HYDROCHLORIDE SCH MG: 500 INJECTION, POWDER, LYOPHILIZED, FOR SOLUTION INTRAVENOUS at 00:15

## 2020-03-18 RX ADMIN — Medication SCH APP: at 21:51

## 2020-03-18 RX ADMIN — Medication SCH GM: at 08:35

## 2020-03-18 RX ADMIN — VANCOMYCIN HYDROCHLORIDE SCH MG: 500 INJECTION, POWDER, LYOPHILIZED, FOR SOLUTION INTRAVENOUS at 05:17

## 2020-03-18 RX ADMIN — METRONIDAZOLE SCH MLS/HR: 500 SOLUTION INTRAVENOUS at 05:58

## 2020-03-18 RX ADMIN — Medication SCH EACH: at 05:17

## 2020-03-18 RX ADMIN — Medication SCH ML: at 09:00

## 2020-03-18 RX ADMIN — Medication SCH APP: at 08:35

## 2020-03-18 RX ADMIN — Medication PRN ML: at 18:04

## 2020-03-18 RX ADMIN — LEVETIRACETAM SCH MG: 500 TABLET, FILM COATED ORAL at 21:50

## 2020-03-18 RX ADMIN — Medication SCH ML: at 14:00

## 2020-03-18 RX ADMIN — LACTOBACILLUS ACIDOPH-L.BULGARICUS 1 MILLION CELL CHEWABLE TABLET SCH TAB.CHEW: at 21:50

## 2020-03-18 RX ADMIN — SODIUM HYPOCHLORITE SCH ML: 1.25 SOLUTION TOPICAL at 09:00

## 2020-03-18 RX ADMIN — FAMOTIDINE SCH MG: 20 TABLET, FILM COATED ORAL at 05:17

## 2020-03-18 RX ADMIN — LEVETIRACETAM SCH MG: 500 TABLET, FILM COATED ORAL at 08:35

## 2020-03-18 RX ADMIN — Medication SCH MG: at 21:50

## 2020-03-18 RX ADMIN — LACTOBACILLUS ACIDOPH-L.BULGARICUS 1 MILLION CELL CHEWABLE TABLET SCH TAB.CHEW: at 08:34

## 2020-03-18 RX ADMIN — METRONIDAZOLE SCH MLS/HR: 500 SOLUTION INTRAVENOUS at 21:57

## 2020-03-18 RX ADMIN — ASCORBIC ACID TAB 250 MG SCH MG: 250 TAB at 20:57

## 2020-03-18 RX ADMIN — VANCOMYCIN HYDROCHLORIDE SCH MG: 500 INJECTION, POWDER, LYOPHILIZED, FOR SOLUTION INTRAVENOUS at 12:25

## 2020-03-18 RX ADMIN — Medication SCH ML: at 21:51

## 2020-03-18 RX ADMIN — Medication SCH EACH: at 18:03

## 2020-03-18 RX ADMIN — ATORVASTATIN CALCIUM SCH MG: 20 TABLET, FILM COATED ORAL at 21:50

## 2020-03-18 RX ADMIN — THERA TABS SCH UDTAB: TAB at 21:51

## 2020-03-18 RX ADMIN — Medication SCH GM: at 21:51

## 2020-03-18 RX ADMIN — ZINC SULFATE CAP 220 MG (50 MG ELEMENTAL ZN) SCH MG: 220 (50 ZN) CAP at 21:51

## 2020-03-18 RX ADMIN — VANCOMYCIN HYDROCHLORIDE SCH MG: 500 INJECTION, POWDER, LYOPHILIZED, FOR SOLUTION INTRAVENOUS at 18:03

## 2020-03-18 RX ADMIN — METRONIDAZOLE SCH MLS/HR: 500 SOLUTION INTRAVENOUS at 14:00

## 2020-03-18 NOTE — NUR
No respiratory distress,trach connected to ventilator,afebrile temp 98.6.continue on flagyl 
iv and vancomycin by Gt for c difficile,on contact isolation,observe closely.

## 2020-03-18 NOTE — NUR
continue on flagyi iv and vancomycin gt for c.diff, continue on contact isolation, no 
adverse reaction noted, afebrile, no respiratory distress noted, right upper arm picc line 
patent and flushed per protocol.

## 2020-03-19 VITALS — SYSTOLIC BLOOD PRESSURE: 135 MMHG | DIASTOLIC BLOOD PRESSURE: 87 MMHG

## 2020-03-19 VITALS — DIASTOLIC BLOOD PRESSURE: 72 MMHG | SYSTOLIC BLOOD PRESSURE: 122 MMHG

## 2020-03-19 RX ADMIN — METRONIDAZOLE SCH MLS/HR: 500 SOLUTION INTRAVENOUS at 22:05

## 2020-03-19 RX ADMIN — Medication SCH ML: at 05:40

## 2020-03-19 RX ADMIN — ASCORBIC ACID TAB 250 MG SCH MG: 250 TAB at 08:44

## 2020-03-19 RX ADMIN — Medication SCH ML: at 14:33

## 2020-03-19 RX ADMIN — Medication SCH GM: at 21:54

## 2020-03-19 RX ADMIN — LACTOBACILLUS ACIDOPH-L.BULGARICUS 1 MILLION CELL CHEWABLE TABLET SCH TAB.CHEW: at 21:54

## 2020-03-19 RX ADMIN — Medication SCH APP: at 21:54

## 2020-03-19 RX ADMIN — FAMOTIDINE SCH MG: 20 TABLET, FILM COATED ORAL at 05:40

## 2020-03-19 RX ADMIN — Medication SCH EACH: at 17:11

## 2020-03-19 RX ADMIN — ATORVASTATIN CALCIUM SCH MG: 20 TABLET, FILM COATED ORAL at 21:54

## 2020-03-19 RX ADMIN — VANCOMYCIN HYDROCHLORIDE SCH MG: 500 INJECTION, POWDER, LYOPHILIZED, FOR SOLUTION INTRAVENOUS at 17:15

## 2020-03-19 RX ADMIN — LACTOBACILLUS ACIDOPH-L.BULGARICUS 1 MILLION CELL CHEWABLE TABLET SCH TAB.CHEW: at 08:44

## 2020-03-19 RX ADMIN — THERA TABS SCH UDTAB: TAB at 21:54

## 2020-03-19 RX ADMIN — Medication SCH ML: at 21:00

## 2020-03-19 RX ADMIN — Medication SCH EACH: at 05:40

## 2020-03-19 RX ADMIN — Medication SCH MG: at 21:53

## 2020-03-19 RX ADMIN — Medication SCH ML: at 21:54

## 2020-03-19 RX ADMIN — Medication SCH APP: at 09:00

## 2020-03-19 RX ADMIN — LEVETIRACETAM SCH MG: 500 TABLET, FILM COATED ORAL at 21:54

## 2020-03-19 RX ADMIN — Medication SCH APP: at 08:45

## 2020-03-19 RX ADMIN — Medication SCH ML: at 09:00

## 2020-03-19 RX ADMIN — LEVETIRACETAM SCH MG: 500 TABLET, FILM COATED ORAL at 08:44

## 2020-03-19 RX ADMIN — METRONIDAZOLE SCH MLS/HR: 500 SOLUTION INTRAVENOUS at 13:52

## 2020-03-19 RX ADMIN — METRONIDAZOLE SCH MLS/HR: 500 SOLUTION INTRAVENOUS at 05:41

## 2020-03-19 RX ADMIN — ZINC SULFATE CAP 220 MG (50 MG ELEMENTAL ZN) SCH MG: 220 (50 ZN) CAP at 21:54

## 2020-03-19 RX ADMIN — ASCORBIC ACID TAB 250 MG SCH MG: 250 TAB at 20:45

## 2020-03-19 RX ADMIN — SODIUM HYPOCHLORITE SCH ML: 1.25 SOLUTION TOPICAL at 09:45

## 2020-03-19 RX ADMIN — Medication SCH GM: at 08:45

## 2020-03-19 NOTE — NUR
continue on flagyl iv and vancomycin gt for c.diff, afebrile, right upper arm picc line 
patent and flushed per protocol, no respiratory distress noted.

## 2020-03-19 NOTE — NUR
DR. MADISON(ON CALL FOR DR. BAEZA)WAS PAGED RE:LAB RESULTS(INCREASED BUN AND 
CREATININE)AND PT. WAS SEEN AND EXAMINED BY ABHILASH SHELTON (I.D) AND WITH NEW ORDERS 
CARRIED OUT.

## 2020-03-20 VITALS — SYSTOLIC BLOOD PRESSURE: 138 MMHG | DIASTOLIC BLOOD PRESSURE: 88 MMHG

## 2020-03-20 LAB
BASOPHILS # BLD AUTO: 0 K/UL (ref 0–8)
BASOPHILS NFR BLD AUTO: 0.3 % (ref 0–2)
BUN SERPL-MCNC: 128 MG/DL (ref 7–18)
CHLORIDE SERPL-SCNC: 111 MMOL/L (ref 98–107)
CO2 SERPL-SCNC: 18 MMOL/L (ref 21–32)
CREAT SERPL-MCNC: 1.8 MG/DL (ref 0.6–1.3)
EOSINOPHIL # BLD AUTO: 0.1 K/UL (ref 0–0.7)
EOSINOPHIL NFR BLD AUTO: 0.7 % (ref 0–7)
GLUCOSE SERPL-MCNC: 115 MG/DL (ref 74–106)
HCT VFR BLD AUTO: 25.6 % (ref 31.2–41.9)
HGB BLD-MCNC: 8.1 G/DL (ref 10.9–14.3)
LYMPHOCYTES # BLD AUTO: 0.8 K/UL (ref 20–40)
LYMPHOCYTES NFR BLD AUTO: 5.8 % (ref 20.5–51.5)
MAGNESIUM SERPL-MCNC: 2.1 MG/DL (ref 1.8–2.4)
MCH RBC QN AUTO: 30.8 UUG (ref 24.7–32.8)
MCHC RBC AUTO-ENTMCNC: 32 G/DL (ref 32.3–35.6)
MCV RBC AUTO: 97.6 FL (ref 75.5–95.3)
MONOCYTES # BLD AUTO: 0.6 K/UL (ref 2–10)
MONOCYTES NFR BLD AUTO: 4 % (ref 0–11)
NEUTROPHILS # BLD AUTO: 12.6 K/UL (ref 1.8–8.9)
NEUTROPHILS NFR BLD AUTO: 89.2 % (ref 38.5–71.5)
PHOSPHATE SERPL-MCNC: 4.2 MG/DL (ref 2.5–4.9)
PLATELET # BLD AUTO: 222 K/UL (ref 179–408)
POTASSIUM SERPL-SCNC: 4.5 MMOL/L (ref 3.5–5.1)
RBC # BLD AUTO: 2.62 MIL/UL (ref 3.63–4.92)
WBC # BLD AUTO: 14.1 K/UL (ref 3.8–11.8)

## 2020-03-20 RX ADMIN — VANCOMYCIN HYDROCHLORIDE SCH MG: 500 INJECTION, POWDER, LYOPHILIZED, FOR SOLUTION INTRAVENOUS at 05:40

## 2020-03-20 RX ADMIN — Medication SCH ML: at 05:40

## 2020-03-20 RX ADMIN — ATORVASTATIN CALCIUM SCH MG: 20 TABLET, FILM COATED ORAL at 20:29

## 2020-03-20 RX ADMIN — LACTOBACILLUS ACIDOPH-L.BULGARICUS 1 MILLION CELL CHEWABLE TABLET SCH TAB.CHEW: at 20:28

## 2020-03-20 RX ADMIN — METRONIDAZOLE SCH MLS/HR: 500 SOLUTION INTRAVENOUS at 05:50

## 2020-03-20 RX ADMIN — BACITRACIN ZINC, NEOMYCIN, POLYMYXIN B SCH PKT: 400; 3.5; 5 OINTMENT TOPICAL at 12:00

## 2020-03-20 RX ADMIN — Medication SCH ML: at 09:00

## 2020-03-20 RX ADMIN — Medication PRN ML: at 14:40

## 2020-03-20 RX ADMIN — Medication SCH MG: at 20:28

## 2020-03-20 RX ADMIN — VANCOMYCIN HYDROCHLORIDE SCH MG: 500 INJECTION, POWDER, LYOPHILIZED, FOR SOLUTION INTRAVENOUS at 00:41

## 2020-03-20 RX ADMIN — METRONIDAZOLE SCH MLS/HR: 500 SOLUTION INTRAVENOUS at 14:00

## 2020-03-20 RX ADMIN — ZINC SULFATE CAP 220 MG (50 MG ELEMENTAL ZN) SCH MG: 220 (50 ZN) CAP at 20:29

## 2020-03-20 RX ADMIN — LACTOBACILLUS ACIDOPH-L.BULGARICUS 1 MILLION CELL CHEWABLE TABLET SCH TAB.CHEW: at 09:39

## 2020-03-20 RX ADMIN — Medication SCH APP: at 20:29

## 2020-03-20 RX ADMIN — FAMOTIDINE SCH MG: 20 TABLET, FILM COATED ORAL at 05:40

## 2020-03-20 RX ADMIN — VANCOMYCIN HYDROCHLORIDE SCH MG: 500 INJECTION, POWDER, LYOPHILIZED, FOR SOLUTION INTRAVENOUS at 23:21

## 2020-03-20 RX ADMIN — LEVETIRACETAM SCH MG: 500 TABLET, FILM COATED ORAL at 09:39

## 2020-03-20 RX ADMIN — Medication SCH GM: at 09:39

## 2020-03-20 RX ADMIN — ASCORBIC ACID TAB 250 MG SCH MG: 250 TAB at 09:38

## 2020-03-20 RX ADMIN — VANCOMYCIN HYDROCHLORIDE SCH MG: 500 INJECTION, POWDER, LYOPHILIZED, FOR SOLUTION INTRAVENOUS at 13:00

## 2020-03-20 RX ADMIN — Medication SCH EACH: at 05:40

## 2020-03-20 RX ADMIN — Medication SCH APP: at 09:41

## 2020-03-20 RX ADMIN — THERA TABS SCH UDTAB: TAB at 20:29

## 2020-03-20 RX ADMIN — Medication SCH ML: at 14:22

## 2020-03-20 RX ADMIN — Medication SCH ML: at 21:51

## 2020-03-20 RX ADMIN — Medication SCH APP: at 09:40

## 2020-03-20 RX ADMIN — Medication SCH GM: at 20:29

## 2020-03-20 RX ADMIN — Medication SCH ML: at 21:10

## 2020-03-20 RX ADMIN — Medication SCH EACH: at 17:42

## 2020-03-20 RX ADMIN — LEVETIRACETAM SCH MG: 500 TABLET, FILM COATED ORAL at 20:28

## 2020-03-20 RX ADMIN — SODIUM HYPOCHLORITE SCH ML: 1.25 SOLUTION TOPICAL at 09:40

## 2020-03-20 RX ADMIN — SODIUM CHLORIDE PRN MLS/HR: 0.9 INJECTION, SOLUTION INTRAVENOUS at 18:27

## 2020-03-20 RX ADMIN — VANCOMYCIN HYDROCHLORIDE SCH MG: 500 INJECTION, POWDER, LYOPHILIZED, FOR SOLUTION INTRAVENOUS at 17:42

## 2020-03-20 RX ADMIN — BACITRACIN ZINC, NEOMYCIN, POLYMYXIN B SCH PKT: 400; 3.5; 5 OINTMENT TOPICAL at 20:29

## 2020-03-20 RX ADMIN — ASCORBIC ACID TAB 250 MG SCH MG: 250 TAB at 20:28

## 2020-03-20 NOTE — NUR
[facility] FOR LOCAL TX ON LEFT LOWER TRACH SITE SKIN BREAK 
AND ON OLD TRACH SUTURE SITES. PICTURE TAKEN.

## 2020-03-20 NOTE — NUR
NEW ORDER CARRIED OUT FROM DR. LOS OJEDA (ON CALL FOR DR. BAEZA) TO REMOVE 
LOCAL TRACH SUTURES AND HE STATED THAT HE WILL COME AND SIGN FOR CONSENT FOR SACRAL WOUND 
DEBRIDEMENT D/T DR. BAEZA WILL BE AVAILABLE TILL NEXT WEE.

## 2020-03-20 NOTE — NUR
continue on flagyl iv and vancomycin gt for c.diff, afebrile, right upper arm picc line 
patent, flushed per protocol, no respiratory distress noted, no adverse reaction noted.

## 2020-03-20 NOTE — NUR
Pt rec'd on Ht-50 vent with ordered settings tolerated well, no complications noted. Trach 
is secure and patent @ midline. Alarms are assessed and are on/audible. Suctioned small 
amounts of yellowish tan secretions. Ambubag and spare trach at bedside. Will continue to 
monitor.

## 2020-03-21 VITALS — SYSTOLIC BLOOD PRESSURE: 124 MMHG | DIASTOLIC BLOOD PRESSURE: 76 MMHG

## 2020-03-21 VITALS — DIASTOLIC BLOOD PRESSURE: 69 MMHG | SYSTOLIC BLOOD PRESSURE: 109 MMHG

## 2020-03-21 RX ADMIN — SODIUM CHLORIDE PRN MLS/HR: 0.9 INJECTION, SOLUTION INTRAVENOUS at 10:04

## 2020-03-21 RX ADMIN — THERA TABS SCH UDTAB: TAB at 21:32

## 2020-03-21 RX ADMIN — FAMOTIDINE SCH MG: 20 TABLET, FILM COATED ORAL at 05:28

## 2020-03-21 RX ADMIN — BACITRACIN ZINC, NEOMYCIN, POLYMYXIN B SCH PKT: 400; 3.5; 5 OINTMENT TOPICAL at 21:35

## 2020-03-21 RX ADMIN — LACTOBACILLUS ACIDOPH-L.BULGARICUS 1 MILLION CELL CHEWABLE TABLET SCH TAB.CHEW: at 09:08

## 2020-03-21 RX ADMIN — ZINC SULFATE CAP 220 MG (50 MG ELEMENTAL ZN) SCH MG: 220 (50 ZN) CAP at 21:32

## 2020-03-21 RX ADMIN — VANCOMYCIN HYDROCHLORIDE SCH MG: 500 INJECTION, POWDER, LYOPHILIZED, FOR SOLUTION INTRAVENOUS at 05:28

## 2020-03-21 RX ADMIN — Medication SCH APP: at 21:32

## 2020-03-21 RX ADMIN — Medication SCH ML: at 09:09

## 2020-03-21 RX ADMIN — Medication SCH ML: at 21:35

## 2020-03-21 RX ADMIN — Medication SCH GM: at 21:32

## 2020-03-21 RX ADMIN — ASCORBIC ACID TAB 250 MG SCH MG: 250 TAB at 09:08

## 2020-03-21 RX ADMIN — ATORVASTATIN CALCIUM SCH MG: 20 TABLET, FILM COATED ORAL at 21:32

## 2020-03-21 RX ADMIN — ASCORBIC ACID TAB 250 MG SCH MG: 250 TAB at 21:31

## 2020-03-21 RX ADMIN — LEVETIRACETAM SCH MG: 500 TABLET, FILM COATED ORAL at 21:31

## 2020-03-21 RX ADMIN — Medication SCH APP: at 09:09

## 2020-03-21 RX ADMIN — Medication SCH EACH: at 05:28

## 2020-03-21 RX ADMIN — SODIUM HYPOCHLORITE SCH ML: 1.25 SOLUTION TOPICAL at 09:09

## 2020-03-21 RX ADMIN — LACTOBACILLUS ACIDOPH-L.BULGARICUS 1 MILLION CELL CHEWABLE TABLET SCH TAB.CHEW: at 21:31

## 2020-03-21 RX ADMIN — VANCOMYCIN HYDROCHLORIDE SCH MG: 500 INJECTION, POWDER, LYOPHILIZED, FOR SOLUTION INTRAVENOUS at 12:49

## 2020-03-21 RX ADMIN — Medication SCH MG: at 21:31

## 2020-03-21 RX ADMIN — Medication SCH ML: at 21:32

## 2020-03-21 RX ADMIN — Medication SCH ML: at 05:28

## 2020-03-21 RX ADMIN — Medication SCH EACH: at 17:40

## 2020-03-21 RX ADMIN — BACITRACIN ZINC, NEOMYCIN, POLYMYXIN B SCH PKT: 400; 3.5; 5 OINTMENT TOPICAL at 09:10

## 2020-03-21 RX ADMIN — Medication SCH ML: at 13:19

## 2020-03-21 RX ADMIN — Medication SCH GM: at 09:09

## 2020-03-21 RX ADMIN — Medication SCH APP: at 21:33

## 2020-03-21 RX ADMIN — VANCOMYCIN HYDROCHLORIDE SCH MG: 500 INJECTION, POWDER, LYOPHILIZED, FOR SOLUTION INTRAVENOUS at 17:40

## 2020-03-21 RX ADMIN — LEVETIRACETAM SCH MG: 500 TABLET, FILM COATED ORAL at 09:08

## 2020-03-21 NOTE — NUR
DR. ADAIR WAS NOTIFIED ABOUT INCREASING EDEMA WITH IV FLUIDS AND ABOUT PROLAPSE FROM 
VAGINA AND NNO.STILL AWAITING FOR DR. ESCUDERO TO CALL BACK.

## 2020-03-21 NOTE — NUR
DR ESCUDERO WAS CALLED AND MESSAGE LEFT :RE A PROLAPSE FROM VAGINA AND THAT PT. IS GETTING 
VERY EDEMATOUS WITH IV FLUIDS.

## 2020-03-21 NOTE — NUR
DR. LOS ARAYA WAS CALLED AND AWARE OF INCREASING GENERAL EDEMA WITH IV FLUIDS  AND 
ALSO PT. HAD PROLAPSE FROM VAGINA(WHEN COUGHING OR HAVING B.M) AND WITH NNO JUST KEEP AREA 
CLEAN AS MUCH AS POSSIBLE.

## 2020-03-21 NOTE — NUR
DR. ARAYA WAS CALLED AGAIN TO CONSIDER ORDERING RECTAL TUBE TO PREVENT UTERINE 
PROLAPSE  TO GET CONTAMINATED WITH DIARRHEA BUT HE STATED THAT WAS NOT GOING TO SOLVE THE 
PROBLEM AND REFUSED IT AND INSTEAD ASKED CH. NURSE TO FIND OUT IF THE HOSPITAL HAS AVAILABLE 
  UTERINE PESSARY INSTEAD  FIRST .MESSAGE LEFT FOR CENTRAL.

## 2020-03-22 VITALS — SYSTOLIC BLOOD PRESSURE: 121 MMHG | DIASTOLIC BLOOD PRESSURE: 43 MMHG

## 2020-03-22 VITALS — SYSTOLIC BLOOD PRESSURE: 123 MMHG | DIASTOLIC BLOOD PRESSURE: 49 MMHG

## 2020-03-22 LAB
BASOPHILS # BLD AUTO: 0 K/UL (ref 0–8)
BASOPHILS NFR BLD AUTO: 0.2 % (ref 0–2)
BUN SERPL-MCNC: 154 MG/DL (ref 7–18)
CHLORIDE SERPL-SCNC: 112 MMOL/L (ref 98–107)
CO2 SERPL-SCNC: 18 MMOL/L (ref 21–32)
CREAT SERPL-MCNC: 1.9 MG/DL (ref 0.6–1.3)
EOSINOPHIL # BLD AUTO: 0.1 K/UL (ref 0–0.7)
EOSINOPHIL NFR BLD AUTO: 0.7 % (ref 0–7)
GLUCOSE SERPL-MCNC: 122 MG/DL (ref 74–106)
HCT VFR BLD AUTO: 26 % (ref 31.2–41.9)
HGB BLD-MCNC: 8.2 G/DL (ref 10.9–14.3)
LYMPHOCYTES # BLD AUTO: 0.7 K/UL (ref 20–40)
LYMPHOCYTES NFR BLD AUTO: 5.6 % (ref 20.5–51.5)
MAGNESIUM SERPL-MCNC: 2.1 MG/DL (ref 1.8–2.4)
MCH RBC QN AUTO: 31.3 UUG (ref 24.7–32.8)
MCHC RBC AUTO-ENTMCNC: 32 G/DL (ref 32.3–35.6)
MCV RBC AUTO: 99.4 FL (ref 75.5–95.3)
MONOCYTES # BLD AUTO: 0.5 K/UL (ref 2–10)
MONOCYTES NFR BLD AUTO: 4.3 % (ref 0–11)
NEUTROPHILS # BLD AUTO: 11.4 K/UL (ref 1.8–8.9)
NEUTROPHILS NFR BLD AUTO: 89.2 % (ref 38.5–71.5)
PHOSPHATE SERPL-MCNC: 4.4 MG/DL (ref 2.5–4.9)
PLATELET # BLD AUTO: 223 K/UL (ref 179–408)
POTASSIUM SERPL-SCNC: 4.8 MMOL/L (ref 3.5–5.1)
RBC # BLD AUTO: 2.62 MIL/UL (ref 3.63–4.92)
WBC # BLD AUTO: 12.7 K/UL (ref 3.8–11.8)

## 2020-03-22 RX ADMIN — Medication PRN ML: at 05:16

## 2020-03-22 RX ADMIN — ZINC SULFATE CAP 220 MG (50 MG ELEMENTAL ZN) SCH MG: 220 (50 ZN) CAP at 20:46

## 2020-03-22 RX ADMIN — VANCOMYCIN HYDROCHLORIDE SCH MG: 500 INJECTION, POWDER, LYOPHILIZED, FOR SOLUTION INTRAVENOUS at 12:00

## 2020-03-22 RX ADMIN — ASCORBIC ACID TAB 250 MG SCH MG: 250 TAB at 20:45

## 2020-03-22 RX ADMIN — BACITRACIN ZINC, NEOMYCIN, POLYMYXIN B SCH PKT: 400; 3.5; 5 OINTMENT TOPICAL at 08:14

## 2020-03-22 RX ADMIN — THERA TABS SCH UDTAB: TAB at 20:46

## 2020-03-22 RX ADMIN — BACITRACIN ZINC, NEOMYCIN, POLYMYXIN B SCH PKT: 400; 3.5; 5 OINTMENT TOPICAL at 20:47

## 2020-03-22 RX ADMIN — ASCORBIC ACID TAB 250 MG SCH MG: 250 TAB at 08:12

## 2020-03-22 RX ADMIN — ATORVASTATIN CALCIUM SCH MG: 20 TABLET, FILM COATED ORAL at 20:46

## 2020-03-22 RX ADMIN — SODIUM CHLORIDE PRN MLS/HR: 0.9 INJECTION, SOLUTION INTRAVENOUS at 13:59

## 2020-03-22 RX ADMIN — LEVETIRACETAM SCH MG: 100 SOLUTION ORAL at 20:46

## 2020-03-22 RX ADMIN — Medication SCH GM: at 20:46

## 2020-03-22 RX ADMIN — Medication SCH APP: at 20:47

## 2020-03-22 RX ADMIN — Medication SCH ML: at 21:00

## 2020-03-22 RX ADMIN — Medication SCH ML: at 22:51

## 2020-03-22 RX ADMIN — FAMOTIDINE SCH MG: 20 TABLET, FILM COATED ORAL at 05:15

## 2020-03-22 RX ADMIN — Medication SCH GM: at 08:14

## 2020-03-22 RX ADMIN — BACITRACIN ZINC, NEOMYCIN, POLYMYXIN B SCH PKT: 400; 3.5; 5 OINTMENT TOPICAL at 08:15

## 2020-03-22 RX ADMIN — SODIUM CHLORIDE PRN MLS/HR: 0.9 INJECTION, SOLUTION INTRAVENOUS at 00:32

## 2020-03-22 RX ADMIN — LACTOBACILLUS ACIDOPH-L.BULGARICUS 1 MILLION CELL CHEWABLE TABLET SCH TAB.CHEW: at 20:45

## 2020-03-22 RX ADMIN — Medication SCH MG: at 20:45

## 2020-03-22 RX ADMIN — VANCOMYCIN HYDROCHLORIDE SCH MG: 500 INJECTION, POWDER, LYOPHILIZED, FOR SOLUTION INTRAVENOUS at 00:26

## 2020-03-22 RX ADMIN — VANCOMYCIN HYDROCHLORIDE SCH MG: 500 INJECTION, POWDER, LYOPHILIZED, FOR SOLUTION INTRAVENOUS at 05:16

## 2020-03-22 RX ADMIN — Medication SCH EACH: at 17:37

## 2020-03-22 RX ADMIN — Medication SCH ML: at 13:33

## 2020-03-22 RX ADMIN — Medication SCH ML: at 05:16

## 2020-03-22 RX ADMIN — Medication SCH APP: at 08:14

## 2020-03-22 RX ADMIN — LACTOBACILLUS ACIDOPH-L.BULGARICUS 1 MILLION CELL CHEWABLE TABLET SCH TAB.CHEW: at 08:13

## 2020-03-22 RX ADMIN — LEVETIRACETAM SCH MG: 500 TABLET, FILM COATED ORAL at 08:13

## 2020-03-22 RX ADMIN — VANCOMYCIN HYDROCHLORIDE SCH MG: 500 INJECTION, POWDER, LYOPHILIZED, FOR SOLUTION INTRAVENOUS at 17:37

## 2020-03-22 RX ADMIN — Medication SCH EACH: at 05:15

## 2020-03-22 RX ADMIN — SODIUM HYPOCHLORITE SCH ML: 1.25 SOLUTION TOPICAL at 08:14

## 2020-03-22 RX ADMIN — Medication SCH ML: at 08:14

## 2020-03-22 NOTE — NUR
PT REC'D ON HT-50 VENT TOLERATING CURRENT VENT SETTINGS WELL, NO DISTRESS NOTED GIVEN IN AM 
REPORT. PT VENT'D VIA TRACHEOSTOMY, TUBE IN PLACE PATENT AND SECURE WITH TRACH TIE. SXN'ING 
AS NEEDED. VENT ALARMS AUDIBLE, CHECKED AND RESET. BVM AND BACK-UP TRACH AT BEDSIDE.

## 2020-03-22 NOTE — NUR
Received pt on HT-50 ventilator with the following settings of AC-12, Vt-450, PEEP+5, 
FIO2-2.5LPM bleed in, trached with Shiley#8 DCT trach, which is in the place and secure. No 
s/s of respiratory distress noted. Airway care done, pt responded to physical stimuli. HME 
changed. PPE used. Resus. bag and back up trach at bedside. Vent and alarms checked and 
reset.

## 2020-03-23 VITALS — DIASTOLIC BLOOD PRESSURE: 79 MMHG | SYSTOLIC BLOOD PRESSURE: 113 MMHG

## 2020-03-23 VITALS — DIASTOLIC BLOOD PRESSURE: 78 MMHG | SYSTOLIC BLOOD PRESSURE: 111 MMHG

## 2020-03-23 LAB
APPEARANCE UR: (no result)
BILIRUB UR QL STRIP: NEGATIVE
COLOR UR: YELLOW
CREAT UR-MCNC: 15.4 MG/DL (ref 30–125)
DEPRECATED SQUAMOUS URNS QL MICRO: (no result) /HPF
GLUCOSE UR STRIP-MCNC: NEGATIVE MG/DL
HGB UR QL STRIP: (no result)
KETONES UR STRIP-MCNC: NEGATIVE MG/DL
LEUKOCYTE ESTERASE UR QL STRIP: (no result)
NITRITE UR QL STRIP: NEGATIVE
PH UR STRIP: 5 [PH] (ref 5–8)
PROT UR-MCNC: 63.1 MG/DL
RBC #/AREA URNS HPF: (no result) /HPF (ref 0–3)
SP GR UR STRIP: 1.01 (ref 1–1.03)
UROBILINOGEN UR STRIP-MCNC: 0.2 E.U./DL
WBC #/AREA URNS HPF: (no result) /HPF
WBC #/AREA URNS HPF: (no result) /HPF (ref 0–3)
YEAST URNS QL MICRO: (no result) /HPF

## 2020-03-23 PROCEDURE — 0KBN0ZZ EXCISION OF RIGHT HIP MUSCLE, OPEN APPROACH: ICD-10-PCS

## 2020-03-23 PROCEDURE — 0KBP0ZZ EXCISION OF LEFT HIP MUSCLE, OPEN APPROACH: ICD-10-PCS

## 2020-03-23 RX ADMIN — BACITRACIN ZINC, NEOMYCIN, POLYMYXIN B SCH PKT: 400; 3.5; 5 OINTMENT TOPICAL at 21:33

## 2020-03-23 RX ADMIN — Medication SCH APP: at 21:32

## 2020-03-23 RX ADMIN — Medication SCH GM: at 21:32

## 2020-03-23 RX ADMIN — Medication PRN ML: at 01:11

## 2020-03-23 RX ADMIN — LACTOBACILLUS ACIDOPH-L.BULGARICUS 1 MILLION CELL CHEWABLE TABLET SCH TAB.CHEW: at 08:04

## 2020-03-23 RX ADMIN — SODIUM HYPOCHLORITE SCH ML: 1.25 SOLUTION TOPICAL at 08:04

## 2020-03-23 RX ADMIN — BACITRACIN ZINC, NEOMYCIN, POLYMYXIN B SCH PKT: 400; 3.5; 5 OINTMENT TOPICAL at 08:04

## 2020-03-23 RX ADMIN — Medication SCH MG: at 21:32

## 2020-03-23 RX ADMIN — BACITRACIN ZINC, NEOMYCIN, POLYMYXIN B SCH PKT: 400; 3.5; 5 OINTMENT TOPICAL at 21:32

## 2020-03-23 RX ADMIN — Medication SCH ML: at 21:33

## 2020-03-23 RX ADMIN — Medication SCH APP: at 08:04

## 2020-03-23 RX ADMIN — THERA TABS SCH UDTAB: TAB at 21:32

## 2020-03-23 RX ADMIN — Medication SCH ML: at 05:28

## 2020-03-23 RX ADMIN — VANCOMYCIN HYDROCHLORIDE SCH MG: 500 INJECTION, POWDER, LYOPHILIZED, FOR SOLUTION INTRAVENOUS at 11:14

## 2020-03-23 RX ADMIN — ZINC SULFATE CAP 220 MG (50 MG ELEMENTAL ZN) SCH MG: 220 (50 ZN) CAP at 21:32

## 2020-03-23 RX ADMIN — VANCOMYCIN HYDROCHLORIDE SCH MG: 500 INJECTION, POWDER, LYOPHILIZED, FOR SOLUTION INTRAVENOUS at 00:00

## 2020-03-23 RX ADMIN — Medication SCH GM: at 08:04

## 2020-03-23 RX ADMIN — VANCOMYCIN HYDROCHLORIDE SCH MG: 500 INJECTION, POWDER, LYOPHILIZED, FOR SOLUTION INTRAVENOUS at 05:28

## 2020-03-23 RX ADMIN — LEVETIRACETAM SCH MG: 100 SOLUTION ORAL at 08:04

## 2020-03-23 RX ADMIN — ASCORBIC ACID TAB 250 MG SCH MG: 250 TAB at 21:32

## 2020-03-23 RX ADMIN — SODIUM CHLORIDE PRN MLS/HR: 0.9 INJECTION, SOLUTION INTRAVENOUS at 04:00

## 2020-03-23 RX ADMIN — ASCORBIC ACID TAB 250 MG SCH MG: 250 TAB at 08:04

## 2020-03-23 RX ADMIN — Medication SCH ML: at 13:50

## 2020-03-23 RX ADMIN — FAMOTIDINE SCH MG: 20 TABLET, FILM COATED ORAL at 05:28

## 2020-03-23 RX ADMIN — Medication SCH EACH: at 17:03

## 2020-03-23 RX ADMIN — LACTOBACILLUS ACIDOPH-L.BULGARICUS 1 MILLION CELL CHEWABLE TABLET SCH TAB.CHEW: at 21:32

## 2020-03-23 RX ADMIN — Medication SCH ML: at 21:00

## 2020-03-23 RX ADMIN — VANCOMYCIN HYDROCHLORIDE SCH MG: 500 INJECTION, POWDER, LYOPHILIZED, FOR SOLUTION INTRAVENOUS at 17:03

## 2020-03-23 RX ADMIN — BACITRACIN ZINC, NEOMYCIN, POLYMYXIN B SCH PKT: 400; 3.5; 5 OINTMENT TOPICAL at 08:05

## 2020-03-23 RX ADMIN — Medication SCH EACH: at 05:28

## 2020-03-23 RX ADMIN — ATORVASTATIN CALCIUM SCH MG: 20 TABLET, FILM COATED ORAL at 21:32

## 2020-03-23 RX ADMIN — LEVETIRACETAM SCH MG: 100 SOLUTION ORAL at 21:32

## 2020-03-23 NOTE — NUR
SACRAL WOUND  (PART OF SERIAL DEBRIDEMENTS)DEBRIDEMENT WAS DONE AT THIS TIME BY JONAS SHELTON AND PT. WAS PREMEDICATED WITH TYLENOL AND LOCAL ANESTHESIA  AND PROCEDURE WELL 
TOLERATED WITH MINIMAL LOCAL BLEEDING.PICTURE WAS TAKEN AND MEASURED WOUND AS 
FOLLOW:9.8X6.1X1CM AND STILL UNABLE TO STAGE.

## 2020-03-23 NOTE — NUR
DR.FAHRAMANDIAN WAS CALLED AND AWARE OF INCREASING EDEMA AND INCREASING BUN AND CREATININE 
AND TACHYCARDIA AND STATED THAT WILL SEE PT.TODAY IN A LITTLE WHILE.

## 2020-03-23 NOTE — NUR
PT. WAS SEEN AND EVALUATED BY DR. FAHRAMANDIAN AND  ALSO AWARE OF UTERINE PROLAPSE  AND 
AWARE OF AWAITING FOR DR. BAEZA TO CALL BACK AND WITH NEW ORDERS CARRIED OUT.

## 2020-03-24 VITALS — DIASTOLIC BLOOD PRESSURE: 76 MMHG | SYSTOLIC BLOOD PRESSURE: 126 MMHG

## 2020-03-24 VITALS — DIASTOLIC BLOOD PRESSURE: 70 MMHG | SYSTOLIC BLOOD PRESSURE: 129 MMHG

## 2020-03-24 LAB
ALP SERPL-CCNC: 87 U/L (ref 50–136)
ALT SERPL W/O P-5'-P-CCNC: 18 U/L (ref 14–59)
AST SERPL-CCNC: 24 U/L (ref 15–37)
BASOPHILS # BLD AUTO: 0 K/UL (ref 0–8)
BASOPHILS NFR BLD AUTO: 0 % (ref 0–2)
BILIRUB SERPL-MCNC: 0.3 MG/DL (ref 0.2–1)
BUN SERPL-MCNC: 188 MG/DL (ref 7–18)
CHLORIDE SERPL-SCNC: 110 MMOL/L (ref 98–107)
CK SERPL-CCNC: 46 U/L (ref 26–192)
CO2 SERPL-SCNC: 18 MMOL/L (ref 21–32)
CREAT SERPL-MCNC: 2.2 MG/DL (ref 0.6–1.3)
EOSINOPHIL # BLD AUTO: 0.1 K/UL (ref 0–0.7)
EOSINOPHIL NFR BLD AUTO: 1 % (ref 0–7)
GLUCOSE SERPL-MCNC: 112 MG/DL (ref 74–106)
HCT VFR BLD AUTO: 23.9 % (ref 31.2–41.9)
HGB BLD-MCNC: 7.6 G/DL (ref 10.9–14.3)
LYMPHOCYTES # BLD AUTO: 0.6 K/UL (ref 20–40)
LYMPHOCYTES NFR BLD AUTO: 5.7 % (ref 20.5–51.5)
MAGNESIUM SERPL-MCNC: 2.4 MG/DL (ref 1.8–2.4)
MCH RBC QN AUTO: 31.4 UUG (ref 24.7–32.8)
MCHC RBC AUTO-ENTMCNC: 32 G/DL (ref 32.3–35.6)
MCV RBC AUTO: 99.5 FL (ref 75.5–95.3)
MONOCYTES # BLD AUTO: 0.5 K/UL (ref 2–10)
MONOCYTES NFR BLD AUTO: 4.3 % (ref 0–11)
NEUTROPHILS # BLD AUTO: 10 K/UL (ref 1.8–8.9)
NEUTROPHILS NFR BLD AUTO: 89 % (ref 38.5–71.5)
PHOSPHATE SERPL-MCNC: 5.2 MG/DL (ref 2.5–4.9)
PLATELET # BLD AUTO: 203 K/UL (ref 179–408)
POTASSIUM SERPL-SCNC: 5.3 MMOL/L (ref 3.5–5.1)
RBC # BLD AUTO: 2.41 MIL/UL (ref 3.63–4.92)
WBC # BLD AUTO: 11.3 K/UL (ref 3.8–11.8)
WS STN SPEC: 6 G/DL (ref 6.4–8.2)

## 2020-03-24 RX ADMIN — ASCORBIC ACID TAB 250 MG SCH MG: 250 TAB at 21:05

## 2020-03-24 RX ADMIN — VANCOMYCIN HYDROCHLORIDE SCH MG: 500 INJECTION, POWDER, LYOPHILIZED, FOR SOLUTION INTRAVENOUS at 05:24

## 2020-03-24 RX ADMIN — Medication SCH APP: at 08:35

## 2020-03-24 RX ADMIN — VANCOMYCIN HYDROCHLORIDE SCH MG: 500 INJECTION, POWDER, LYOPHILIZED, FOR SOLUTION INTRAVENOUS at 00:00

## 2020-03-24 RX ADMIN — ASCORBIC ACID TAB 250 MG SCH MG: 250 TAB at 08:32

## 2020-03-24 RX ADMIN — ACETAMINOPHEN PRN MG: 160 SOLUTION ORAL at 12:30

## 2020-03-24 RX ADMIN — ACETAMINOPHEN PRN MG: 160 SOLUTION ORAL at 00:00

## 2020-03-24 RX ADMIN — Medication SCH ML: at 21:07

## 2020-03-24 RX ADMIN — LACTOBACILLUS ACIDOPH-L.BULGARICUS 1 MILLION CELL CHEWABLE TABLET SCH TAB.CHEW: at 21:05

## 2020-03-24 RX ADMIN — VANCOMYCIN HYDROCHLORIDE SCH MG: 500 INJECTION, POWDER, LYOPHILIZED, FOR SOLUTION INTRAVENOUS at 12:29

## 2020-03-24 RX ADMIN — Medication SCH APP: at 21:07

## 2020-03-24 RX ADMIN — Medication SCH GM: at 21:06

## 2020-03-24 RX ADMIN — FAMOTIDINE SCH MG: 20 TABLET, FILM COATED ORAL at 05:24

## 2020-03-24 RX ADMIN — Medication SCH EACH: at 00:00

## 2020-03-24 RX ADMIN — LACTOBACILLUS ACIDOPH-L.BULGARICUS 1 MILLION CELL CHEWABLE TABLET SCH TAB.CHEW: at 08:33

## 2020-03-24 RX ADMIN — THERA TABS SCH UDTAB: TAB at 21:06

## 2020-03-24 RX ADMIN — Medication SCH ML: at 14:37

## 2020-03-24 RX ADMIN — LEVETIRACETAM SCH MG: 100 SOLUTION ORAL at 21:05

## 2020-03-24 RX ADMIN — LEVETIRACETAM SCH MG: 100 SOLUTION ORAL at 08:33

## 2020-03-24 RX ADMIN — BACITRACIN ZINC, NEOMYCIN, POLYMYXIN B SCH PKT: 400; 3.5; 5 OINTMENT TOPICAL at 09:30

## 2020-03-24 RX ADMIN — SODIUM HYPOCHLORITE SCH ML: 1.25 SOLUTION TOPICAL at 09:30

## 2020-03-24 RX ADMIN — Medication SCH ML: at 21:00

## 2020-03-24 RX ADMIN — Medication SCH EACH: at 17:31

## 2020-03-24 RX ADMIN — Medication SCH ML: at 09:00

## 2020-03-24 RX ADMIN — ATORVASTATIN CALCIUM SCH MG: 20 TABLET, FILM COATED ORAL at 21:06

## 2020-03-24 RX ADMIN — VANCOMYCIN HYDROCHLORIDE SCH MG: 500 INJECTION, POWDER, LYOPHILIZED, FOR SOLUTION INTRAVENOUS at 17:31

## 2020-03-24 RX ADMIN — ZINC SULFATE CAP 220 MG (50 MG ELEMENTAL ZN) SCH MG: 220 (50 ZN) CAP at 21:06

## 2020-03-24 RX ADMIN — BACITRACIN ZINC, NEOMYCIN, POLYMYXIN B SCH PKT: 400; 3.5; 5 OINTMENT TOPICAL at 21:07

## 2020-03-24 RX ADMIN — Medication SCH GM: at 08:35

## 2020-03-24 RX ADMIN — Medication SCH ML: at 05:24

## 2020-03-24 RX ADMIN — Medication SCH MG: at 21:05

## 2020-03-24 NOTE — NUR
Left message to Dr castaneda,regarding labs results didn't call back Spoke to Dr Garcia 
regarding the BUN  188 and K 4.3 ,with new orders noted to start NSS at 75 cc/hr x 1 liter 
,orders carried out.

## 2020-03-25 VITALS — DIASTOLIC BLOOD PRESSURE: 78 MMHG | SYSTOLIC BLOOD PRESSURE: 124 MMHG

## 2020-03-25 VITALS — SYSTOLIC BLOOD PRESSURE: 145 MMHG | DIASTOLIC BLOOD PRESSURE: 80 MMHG

## 2020-03-25 LAB
C3 SERPL-MCNC: 52 MG/DL (ref 82–167)
C4 SERPL-MCNC: 11 MG/DL (ref 14–44)
DSDNA AB SER-ACNC: 2 IU/ML (ref 0–9)
ENA SCL70 AB SER-ACNC: <0.2 AI (ref 0–0.9)
ENA SM AB SER-ACNC: <0.2 AI (ref 0–0.9)
ENA SS-A AB SER-ACNC: <0.2 AI (ref 0–0.9)
ENA SS-B AB SER-ACNC: <0.2 AI (ref 0–0.9)

## 2020-03-25 RX ADMIN — BACITRACIN ZINC, NEOMYCIN, POLYMYXIN B SCH PKT: 400; 3.5; 5 OINTMENT TOPICAL at 21:28

## 2020-03-25 RX ADMIN — Medication SCH ML: at 21:27

## 2020-03-25 RX ADMIN — Medication SCH GM: at 09:06

## 2020-03-25 RX ADMIN — LEVETIRACETAM SCH MG: 100 SOLUTION ORAL at 09:04

## 2020-03-25 RX ADMIN — Medication SCH APP: at 21:27

## 2020-03-25 RX ADMIN — Medication PRN ML: at 01:10

## 2020-03-25 RX ADMIN — LACTOBACILLUS ACIDOPH-L.BULGARICUS 1 MILLION CELL CHEWABLE TABLET SCH TAB.CHEW: at 21:25

## 2020-03-25 RX ADMIN — VANCOMYCIN HYDROCHLORIDE SCH MG: 500 INJECTION, POWDER, LYOPHILIZED, FOR SOLUTION INTRAVENOUS at 00:00

## 2020-03-25 RX ADMIN — Medication SCH ML: at 05:24

## 2020-03-25 RX ADMIN — ACETAMINOPHEN PRN MG: 160 SOLUTION ORAL at 22:00

## 2020-03-25 RX ADMIN — Medication SCH EACH: at 05:23

## 2020-03-25 RX ADMIN — Medication SCH GM: at 21:27

## 2020-03-25 RX ADMIN — Medication SCH ML: at 21:28

## 2020-03-25 RX ADMIN — SODIUM HYPOCHLORITE SCH ML: 1.25 SOLUTION TOPICAL at 09:07

## 2020-03-25 RX ADMIN — VANCOMYCIN HYDROCHLORIDE SCH MG: 500 INJECTION, POWDER, LYOPHILIZED, FOR SOLUTION INTRAVENOUS at 05:24

## 2020-03-25 RX ADMIN — FAMOTIDINE SCH MG: 20 TABLET, FILM COATED ORAL at 05:23

## 2020-03-25 RX ADMIN — Medication SCH APP: at 21:28

## 2020-03-25 RX ADMIN — BACITRACIN ZINC, NEOMYCIN, POLYMYXIN B SCH PKT: 400; 3.5; 5 OINTMENT TOPICAL at 09:08

## 2020-03-25 RX ADMIN — BACITRACIN ZINC, NEOMYCIN, POLYMYXIN B SCH PKT: 400; 3.5; 5 OINTMENT TOPICAL at 09:07

## 2020-03-25 RX ADMIN — VANCOMYCIN HYDROCHLORIDE SCH MG: 500 INJECTION, POWDER, LYOPHILIZED, FOR SOLUTION INTRAVENOUS at 12:34

## 2020-03-25 RX ADMIN — Medication SCH APP: at 09:07

## 2020-03-25 RX ADMIN — ASCORBIC ACID TAB 250 MG SCH MG: 250 TAB at 09:04

## 2020-03-25 RX ADMIN — VANCOMYCIN HYDROCHLORIDE SCH MG: 500 INJECTION, POWDER, LYOPHILIZED, FOR SOLUTION INTRAVENOUS at 18:50

## 2020-03-25 RX ADMIN — LACTOBACILLUS ACIDOPH-L.BULGARICUS 1 MILLION CELL CHEWABLE TABLET SCH TAB.CHEW: at 09:04

## 2020-03-25 RX ADMIN — ASCORBIC ACID TAB 250 MG SCH MG: 250 TAB at 21:25

## 2020-03-25 RX ADMIN — Medication SCH ML: at 09:06

## 2020-03-25 RX ADMIN — ZINC SULFATE CAP 220 MG (50 MG ELEMENTAL ZN) SCH MG: 220 (50 ZN) CAP at 21:27

## 2020-03-25 RX ADMIN — THERA TABS SCH UDTAB: TAB at 21:27

## 2020-03-25 RX ADMIN — ACETAMINOPHEN PRN MG: 160 SOLUTION ORAL at 09:08

## 2020-03-25 RX ADMIN — Medication SCH EACH: at 18:50

## 2020-03-25 RX ADMIN — Medication SCH ML: at 14:00

## 2020-03-25 RX ADMIN — Medication SCH MG: at 21:25

## 2020-03-25 RX ADMIN — ATORVASTATIN CALCIUM SCH MG: 20 TABLET, FILM COATED ORAL at 21:26

## 2020-03-25 RX ADMIN — LEVETIRACETAM SCH MG: 100 SOLUTION ORAL at 21:26

## 2020-03-25 NOTE — NUR
Afebrile, remains on vancomycin via GT for c-diff in the stool, no adverse reactions noted, 
still with on and off loose stools, with ongoing treatment to sacral wound as ordered, on 
contact isolation precaution, kapoor catheter is draining well to yellow urine output. with 
protruding uterus in the vagina, kept vaginal area clean, turned and repositioned, will 
continue monitor.

## 2020-03-26 VITALS — SYSTOLIC BLOOD PRESSURE: 98 MMHG | DIASTOLIC BLOOD PRESSURE: 53 MMHG

## 2020-03-26 VITALS — SYSTOLIC BLOOD PRESSURE: 135 MMHG | DIASTOLIC BLOOD PRESSURE: 81 MMHG

## 2020-03-26 LAB
ALP SERPL-CCNC: 112 U/L (ref 50–136)
ALT SERPL W/O P-5'-P-CCNC: 18 U/L (ref 14–59)
AST SERPL-CCNC: 26 U/L (ref 15–37)
BASOPHILS # BLD AUTO: 0 K/UL (ref 0–8)
BASOPHILS NFR BLD AUTO: 0.4 % (ref 0–2)
BILIRUB SERPL-MCNC: 0.3 MG/DL (ref 0.2–1)
BUN SERPL-MCNC: 195 MG/DL (ref 7–18)
CHLORIDE SERPL-SCNC: 108 MMOL/L (ref 98–107)
CO2 SERPL-SCNC: 16 MMOL/L (ref 21–32)
CREAT SERPL-MCNC: 2.4 MG/DL (ref 0.6–1.3)
EOSINOPHIL # BLD AUTO: 0.2 K/UL (ref 0–0.7)
EOSINOPHIL NFR BLD AUTO: 1.8 % (ref 0–7)
GLUCOSE SERPL-MCNC: 129 MG/DL (ref 74–106)
HCT VFR BLD AUTO: 24.7 % (ref 31.2–41.9)
HGB BLD-MCNC: 7.9 G/DL (ref 10.9–14.3)
LYMPHOCYTES # BLD AUTO: 0.7 K/UL (ref 20–40)
LYMPHOCYTES NFR BLD AUTO: 8 % (ref 20.5–51.5)
MAGNESIUM SERPL-MCNC: 2.3 MG/DL (ref 1.8–2.4)
MCH RBC QN AUTO: 32.1 UUG (ref 24.7–32.8)
MCHC RBC AUTO-ENTMCNC: 32 G/DL (ref 32.3–35.6)
MCV RBC AUTO: 100.1 FL (ref 75.5–95.3)
MONOCYTES # BLD AUTO: 0.4 K/UL (ref 2–10)
MONOCYTES NFR BLD AUTO: 4.3 % (ref 0–11)
NEUTROPHILS # BLD AUTO: 7.8 K/UL (ref 1.8–8.9)
NEUTROPHILS NFR BLD AUTO: 85.5 % (ref 38.5–71.5)
PHOSPHATE SERPL-MCNC: 6.5 MG/DL (ref 2.5–4.9)
PLATELET # BLD AUTO: 279 K/UL (ref 179–408)
POTASSIUM SERPL-SCNC: 5.7 MMOL/L (ref 3.5–5.1)
RBC # BLD AUTO: 2.47 MIL/UL (ref 3.63–4.92)
WBC # BLD AUTO: 9.1 K/UL (ref 3.8–11.8)
WS STN SPEC: 6.3 G/DL (ref 6.4–8.2)

## 2020-03-26 RX ADMIN — ZINC SULFATE CAP 220 MG (50 MG ELEMENTAL ZN) SCH MG: 220 (50 ZN) CAP at 21:57

## 2020-03-26 RX ADMIN — VANCOMYCIN HYDROCHLORIDE SCH MG: 500 INJECTION, POWDER, LYOPHILIZED, FOR SOLUTION INTRAVENOUS at 05:08

## 2020-03-26 RX ADMIN — Medication SCH GM: at 21:57

## 2020-03-26 RX ADMIN — Medication PRN ML: at 17:34

## 2020-03-26 RX ADMIN — THERA TABS SCH UDTAB: TAB at 21:57

## 2020-03-26 RX ADMIN — Medication SCH APPLIC: at 21:57

## 2020-03-26 RX ADMIN — VANCOMYCIN HYDROCHLORIDE SCH MG: 500 INJECTION, POWDER, LYOPHILIZED, FOR SOLUTION INTRAVENOUS at 21:57

## 2020-03-26 RX ADMIN — ASCORBIC ACID TAB 250 MG SCH MG: 250 TAB at 20:45

## 2020-03-26 RX ADMIN — Medication SCH EACH: at 17:34

## 2020-03-26 RX ADMIN — Medication SCH ML: at 21:00

## 2020-03-26 RX ADMIN — Medication SCH MG: at 21:55

## 2020-03-26 RX ADMIN — ASCORBIC ACID TAB 250 MG SCH MG: 250 TAB at 08:54

## 2020-03-26 RX ADMIN — SODIUM HYPOCHLORITE SCH ML: 1.25 SOLUTION TOPICAL at 08:55

## 2020-03-26 RX ADMIN — BACITRACIN ZINC, NEOMYCIN, POLYMYXIN B SCH PKT: 400; 3.5; 5 OINTMENT TOPICAL at 21:57

## 2020-03-26 RX ADMIN — VANCOMYCIN HYDROCHLORIDE SCH MG: 500 INJECTION, POWDER, LYOPHILIZED, FOR SOLUTION INTRAVENOUS at 00:47

## 2020-03-26 RX ADMIN — Medication SCH APPLIC: at 08:54

## 2020-03-26 RX ADMIN — Medication SCH ML: at 14:58

## 2020-03-26 RX ADMIN — LEVETIRACETAM SCH MG: 100 SOLUTION ORAL at 21:55

## 2020-03-26 RX ADMIN — FAMOTIDINE SCH MG: 20 TABLET, FILM COATED ORAL at 05:07

## 2020-03-26 RX ADMIN — LACTOBACILLUS ACIDOPH-L.BULGARICUS 1 MILLION CELL CHEWABLE TABLET SCH TAB.CHEW: at 08:54

## 2020-03-26 RX ADMIN — Medication SCH ML: at 09:00

## 2020-03-26 RX ADMIN — Medication PRN ML: at 01:05

## 2020-03-26 RX ADMIN — VANCOMYCIN HYDROCHLORIDE SCH MG: 500 INJECTION, POWDER, LYOPHILIZED, FOR SOLUTION INTRAVENOUS at 14:58

## 2020-03-26 RX ADMIN — LACTOBACILLUS ACIDOPH-L.BULGARICUS 1 MILLION CELL CHEWABLE TABLET SCH TAB.CHEW: at 21:55

## 2020-03-26 RX ADMIN — Medication SCH ML: at 21:57

## 2020-03-26 RX ADMIN — ATORVASTATIN CALCIUM SCH MG: 20 TABLET, FILM COATED ORAL at 21:55

## 2020-03-26 RX ADMIN — BACITRACIN ZINC, NEOMYCIN, POLYMYXIN B SCH PKT: 400; 3.5; 5 OINTMENT TOPICAL at 08:55

## 2020-03-26 RX ADMIN — Medication SCH EACH: at 05:07

## 2020-03-26 RX ADMIN — LEVETIRACETAM SCH MG: 100 SOLUTION ORAL at 08:54

## 2020-03-26 RX ADMIN — Medication SCH ML: at 05:07

## 2020-03-26 RX ADMIN — Medication SCH APPLIC: at 08:55

## 2020-03-26 RX ADMIN — Medication SCH GM: at 08:54

## 2020-03-26 NOTE — NUR
Patient is afebrile, no vaginal bleeding  noted, still noted uterus to be protruding in the 
vagina, turned and repositioned, kept clean and comfortable.

## 2020-03-26 NOTE — NUR
PAGED BOTH DR. MALAGON AND DR. BAEZA REGARDING CRITICAL LABS: , CRAE. 
2.4, KURT 6.4, ABUMIN 1.3 AND K 5.7. DR. BAEZA CALLED BACK AND NOTED THAT VALUES ARE 
RELATIVELY CLOSE TO HER BASELINE WITH NEW ORDER FOR KAYEXALATE 30MG NOW AND BMP ON MONDAY. 
NOTED AND CARRIED OUT. DR. MALAGON CALLED BACK AFTER RELAYING THE LAB FINDINGS, HE WANT 
TO DISCUSS A TRANSFER TO ACUTE CARE WITH DR. BAEZA.  AWATING A CALL BACK FROM DR. MALAGON. WILL CONTINUE TO MONITOR.

## 2020-03-27 VITALS — DIASTOLIC BLOOD PRESSURE: 74 MMHG | SYSTOLIC BLOOD PRESSURE: 130 MMHG

## 2020-03-27 VITALS — DIASTOLIC BLOOD PRESSURE: 53 MMHG | SYSTOLIC BLOOD PRESSURE: 92 MMHG

## 2020-03-27 VITALS — DIASTOLIC BLOOD PRESSURE: 51 MMHG | SYSTOLIC BLOOD PRESSURE: 106 MMHG

## 2020-03-27 VITALS — DIASTOLIC BLOOD PRESSURE: 73 MMHG | SYSTOLIC BLOOD PRESSURE: 109 MMHG

## 2020-03-27 RX ADMIN — Medication SCH APPLIC: at 20:41

## 2020-03-27 RX ADMIN — SODIUM HYPOCHLORITE SCH ML: 1.25 SOLUTION TOPICAL at 08:35

## 2020-03-27 RX ADMIN — Medication SCH ML: at 09:00

## 2020-03-27 RX ADMIN — ZINC SULFATE CAP 220 MG (50 MG ELEMENTAL ZN) SCH MG: 220 (50 ZN) CAP at 20:39

## 2020-03-27 RX ADMIN — ASCORBIC ACID TAB 250 MG SCH MG: 250 TAB at 08:35

## 2020-03-27 RX ADMIN — Medication SCH APPLIC: at 08:35

## 2020-03-27 RX ADMIN — VANCOMYCIN HYDROCHLORIDE SCH MG: 500 INJECTION, POWDER, LYOPHILIZED, FOR SOLUTION INTRAVENOUS at 05:30

## 2020-03-27 RX ADMIN — LACTOBACILLUS ACIDOPH-L.BULGARICUS 1 MILLION CELL CHEWABLE TABLET SCH TAB.CHEW: at 08:35

## 2020-03-27 RX ADMIN — Medication SCH ML: at 14:50

## 2020-03-27 RX ADMIN — LEVETIRACETAM SCH MG: 100 SOLUTION ORAL at 08:35

## 2020-03-27 RX ADMIN — VANCOMYCIN HYDROCHLORIDE SCH MG: 500 INJECTION, POWDER, LYOPHILIZED, FOR SOLUTION INTRAVENOUS at 14:50

## 2020-03-27 RX ADMIN — ATORVASTATIN CALCIUM SCH MG: 20 TABLET, FILM COATED ORAL at 20:38

## 2020-03-27 RX ADMIN — Medication SCH MG: at 20:40

## 2020-03-27 RX ADMIN — Medication SCH EACH: at 17:29

## 2020-03-27 RX ADMIN — LEVETIRACETAM SCH MG: 100 SOLUTION ORAL at 20:37

## 2020-03-27 RX ADMIN — Medication PRN ML: at 14:50

## 2020-03-27 RX ADMIN — HYDROCODONE BITARTRATE AND ACETAMINOPHEN PRN TAB: 5; 325 TABLET ORAL at 03:56

## 2020-03-27 RX ADMIN — THERA TABS SCH UDTAB: TAB at 20:39

## 2020-03-27 RX ADMIN — Medication SCH GM: at 20:41

## 2020-03-27 RX ADMIN — Medication SCH ML: at 21:50

## 2020-03-27 RX ADMIN — Medication SCH ML: at 05:30

## 2020-03-27 RX ADMIN — FAMOTIDINE SCH MG: 20 TABLET, FILM COATED ORAL at 05:29

## 2020-03-27 RX ADMIN — Medication SCH EACH: at 05:29

## 2020-03-27 RX ADMIN — VANCOMYCIN HYDROCHLORIDE SCH MG: 500 INJECTION, POWDER, LYOPHILIZED, FOR SOLUTION INTRAVENOUS at 22:00

## 2020-03-27 RX ADMIN — Medication SCH ML: at 21:09

## 2020-03-27 RX ADMIN — LACTOBACILLUS ACIDOPH-L.BULGARICUS 1 MILLION CELL CHEWABLE TABLET SCH TAB.CHEW: at 20:39

## 2020-03-27 RX ADMIN — Medication SCH GM: at 08:35

## 2020-03-27 RX ADMIN — BACITRACIN ZINC, NEOMYCIN, POLYMYXIN B SCH PKT: 400; 3.5; 5 OINTMENT TOPICAL at 20:41

## 2020-03-27 RX ADMIN — BACITRACIN ZINC, NEOMYCIN, POLYMYXIN B SCH PKT: 400; 3.5; 5 OINTMENT TOPICAL at 08:35

## 2020-03-27 RX ADMIN — ASCORBIC ACID TAB 250 MG SCH MG: 250 TAB at 20:43

## 2020-03-27 NOTE — NUR
PATIENT RELAX NO FURTHER EPISODE OF SEIZURE ACTIVITY, NO ADVERSE REACTION FROM ATIVAN, CONT 
TO MONITOR.

## 2020-03-28 ENCOUNTER — HOSPITAL ENCOUNTER (INPATIENT)
Dept: HOSPITAL 12 - TELE3 | Age: 85
LOS: 11 days | Discharge: NURSING, REHAB OR LONG-TERM CARE | DRG: 853 | End: 2020-04-08
Attending: INTERNAL MEDICINE | Admitting: INTERNAL MEDICINE
Payer: MEDICARE

## 2020-03-28 VITALS — SYSTOLIC BLOOD PRESSURE: 118 MMHG | DIASTOLIC BLOOD PRESSURE: 65 MMHG

## 2020-03-28 VITALS — SYSTOLIC BLOOD PRESSURE: 122 MMHG | DIASTOLIC BLOOD PRESSURE: 62 MMHG

## 2020-03-28 VITALS — SYSTOLIC BLOOD PRESSURE: 122 MMHG | DIASTOLIC BLOOD PRESSURE: 70 MMHG

## 2020-03-28 VITALS — HEIGHT: 65 IN | BODY MASS INDEX: 34.16 KG/M2 | WEIGHT: 205.02 LBS

## 2020-03-28 VITALS — SYSTOLIC BLOOD PRESSURE: 124 MMHG | DIASTOLIC BLOOD PRESSURE: 78 MMHG

## 2020-03-28 VITALS — DIASTOLIC BLOOD PRESSURE: 71 MMHG | SYSTOLIC BLOOD PRESSURE: 119 MMHG

## 2020-03-28 DIAGNOSIS — R62.7: ICD-10-CM

## 2020-03-28 DIAGNOSIS — D63.1: ICD-10-CM

## 2020-03-28 DIAGNOSIS — R65.21: ICD-10-CM

## 2020-03-28 DIAGNOSIS — J96.11: ICD-10-CM

## 2020-03-28 DIAGNOSIS — J90: ICD-10-CM

## 2020-03-28 DIAGNOSIS — D53.9: ICD-10-CM

## 2020-03-28 DIAGNOSIS — Z99.11: ICD-10-CM

## 2020-03-28 DIAGNOSIS — I25.10: ICD-10-CM

## 2020-03-28 DIAGNOSIS — K76.6: ICD-10-CM

## 2020-03-28 DIAGNOSIS — F41.9: ICD-10-CM

## 2020-03-28 DIAGNOSIS — Z87.891: ICD-10-CM

## 2020-03-28 DIAGNOSIS — N39.0: ICD-10-CM

## 2020-03-28 DIAGNOSIS — N18.5: ICD-10-CM

## 2020-03-28 DIAGNOSIS — N25.0: ICD-10-CM

## 2020-03-28 DIAGNOSIS — I25.5: ICD-10-CM

## 2020-03-28 DIAGNOSIS — N81.4: ICD-10-CM

## 2020-03-28 DIAGNOSIS — J18.9: ICD-10-CM

## 2020-03-28 DIAGNOSIS — Z86.718: ICD-10-CM

## 2020-03-28 DIAGNOSIS — G92: ICD-10-CM

## 2020-03-28 DIAGNOSIS — G40.909: ICD-10-CM

## 2020-03-28 DIAGNOSIS — E87.5: ICD-10-CM

## 2020-03-28 DIAGNOSIS — Z93.1: ICD-10-CM

## 2020-03-28 DIAGNOSIS — N17.9: ICD-10-CM

## 2020-03-28 DIAGNOSIS — E88.09: ICD-10-CM

## 2020-03-28 DIAGNOSIS — G62.9: ICD-10-CM

## 2020-03-28 DIAGNOSIS — I13.2: ICD-10-CM

## 2020-03-28 DIAGNOSIS — A04.72: ICD-10-CM

## 2020-03-28 DIAGNOSIS — L89.154: ICD-10-CM

## 2020-03-28 DIAGNOSIS — M81.0: ICD-10-CM

## 2020-03-28 DIAGNOSIS — E43: ICD-10-CM

## 2020-03-28 DIAGNOSIS — F03.90: ICD-10-CM

## 2020-03-28 DIAGNOSIS — I21.A1: ICD-10-CM

## 2020-03-28 DIAGNOSIS — B96.5: ICD-10-CM

## 2020-03-28 DIAGNOSIS — I27.20: ICD-10-CM

## 2020-03-28 DIAGNOSIS — Z86.711: ICD-10-CM

## 2020-03-28 DIAGNOSIS — E83.39: ICD-10-CM

## 2020-03-28 DIAGNOSIS — M47.9: ICD-10-CM

## 2020-03-28 DIAGNOSIS — E11.22: ICD-10-CM

## 2020-03-28 DIAGNOSIS — D68.69: ICD-10-CM

## 2020-03-28 DIAGNOSIS — I34.0: ICD-10-CM

## 2020-03-28 DIAGNOSIS — I48.0: ICD-10-CM

## 2020-03-28 DIAGNOSIS — M48.02: ICD-10-CM

## 2020-03-28 DIAGNOSIS — F32.9: ICD-10-CM

## 2020-03-28 DIAGNOSIS — I50.43: ICD-10-CM

## 2020-03-28 DIAGNOSIS — R13.10: ICD-10-CM

## 2020-03-28 DIAGNOSIS — Z87.01: ICD-10-CM

## 2020-03-28 DIAGNOSIS — I25.2: ICD-10-CM

## 2020-03-28 DIAGNOSIS — Z93.0: ICD-10-CM

## 2020-03-28 DIAGNOSIS — A41.9: Primary | ICD-10-CM

## 2020-03-28 LAB
ALP SERPL-CCNC: 138 U/L (ref 50–136)
ALT SERPL W/O P-5'-P-CCNC: 30 U/L (ref 14–59)
AST SERPL-CCNC: 32 U/L (ref 15–37)
BASOPHILS # BLD AUTO: 0 K/UL (ref 0–8)
BASOPHILS # BLD AUTO: 0 K/UL (ref 0–8)
BASOPHILS NFR BLD AUTO: 0.3 % (ref 0–2)
BASOPHILS NFR BLD AUTO: 0.6 % (ref 0–2)
BILIRUB SERPL-MCNC: 0.3 MG/DL (ref 0.2–1)
BUN SERPL-MCNC: 230 MG/DL (ref 7–18)
BUN SERPL-MCNC: 235 MG/DL (ref 7–18)
CHLORIDE SERPL-SCNC: 108 MMOL/L (ref 98–107)
CHLORIDE SERPL-SCNC: 109 MMOL/L (ref 98–107)
CO2 SERPL-SCNC: 16 MMOL/L (ref 21–32)
CO2 SERPL-SCNC: 17 MMOL/L (ref 21–32)
CREAT SERPL-MCNC: 2.8 MG/DL (ref 0.6–1.3)
CREAT SERPL-MCNC: 2.9 MG/DL (ref 0.6–1.3)
EOSINOPHIL # BLD AUTO: 0.1 K/UL (ref 0–0.7)
EOSINOPHIL # BLD AUTO: 0.2 K/UL (ref 0–0.7)
EOSINOPHIL NFR BLD AUTO: 1.4 % (ref 0–7)
EOSINOPHIL NFR BLD AUTO: 2.2 % (ref 0–7)
GLUCOSE SERPL-MCNC: 114 MG/DL (ref 74–106)
GLUCOSE SERPL-MCNC: 116 MG/DL (ref 74–106)
HCT VFR BLD AUTO: 22.2 % (ref 31.2–41.9)
HCT VFR BLD AUTO: 23.4 % (ref 31.2–41.9)
HGB BLD-MCNC: 7.2 G/DL (ref 10.9–14.3)
HGB BLD-MCNC: 7.6 G/DL (ref 10.9–14.3)
LYMPHOCYTES # BLD AUTO: 0.7 K/UL (ref 20–40)
LYMPHOCYTES # BLD AUTO: 0.7 K/UL (ref 20–40)
LYMPHOCYTES NFR BLD AUTO: 7.9 % (ref 20.5–51.5)
LYMPHOCYTES NFR BLD AUTO: 8.8 % (ref 20.5–51.5)
MAGNESIUM SERPL-MCNC: 2.5 MG/DL (ref 1.8–2.4)
MCH RBC QN AUTO: 32.2 UUG (ref 24.7–32.8)
MCH RBC QN AUTO: 32.2 UUG (ref 24.7–32.8)
MCHC RBC AUTO-ENTMCNC: 33 G/DL (ref 32.3–35.6)
MCHC RBC AUTO-ENTMCNC: 33 G/DL (ref 32.3–35.6)
MCV RBC AUTO: 99 FL (ref 75.5–95.3)
MCV RBC AUTO: 99.1 FL (ref 75.5–95.3)
MONOCYTES # BLD AUTO: 0.4 K/UL (ref 2–10)
MONOCYTES # BLD AUTO: 0.5 K/UL (ref 2–10)
MONOCYTES NFR BLD AUTO: 4.3 % (ref 0–11)
MONOCYTES NFR BLD AUTO: 5.6 % (ref 0–11)
NEUTROPHILS # BLD AUTO: 6.9 K/UL (ref 1.8–8.9)
NEUTROPHILS # BLD AUTO: 7.1 K/UL (ref 1.8–8.9)
NEUTROPHILS NFR BLD AUTO: 84.4 % (ref 38.5–71.5)
NEUTROPHILS NFR BLD AUTO: 84.5 % (ref 38.5–71.5)
PHOSPHATE SERPL-MCNC: 7.5 MG/DL (ref 2.5–4.9)
PLATELET # BLD AUTO: 306 K/UL (ref 179–408)
PLATELET # BLD AUTO: 310 K/UL (ref 179–408)
POTASSIUM SERPL-SCNC: 5.8 MMOL/L (ref 3.5–5.1)
POTASSIUM SERPL-SCNC: 5.8 MMOL/L (ref 3.5–5.1)
RBC # BLD AUTO: 2.25 MIL/UL (ref 3.63–4.92)
RBC # BLD AUTO: 2.37 MIL/UL (ref 3.63–4.92)
WBC # BLD AUTO: 8.2 K/UL (ref 3.8–11.8)
WBC # BLD AUTO: 8.4 K/UL (ref 3.8–11.8)
WS STN SPEC: 6.3 G/DL (ref 6.4–8.2)

## 2020-03-28 PROCEDURE — G0378 HOSPITAL OBSERVATION PER HR: HCPCS

## 2020-03-28 PROCEDURE — P9047 ALBUMIN (HUMAN), 25%, 50ML: HCPCS

## 2020-03-28 PROCEDURE — A4217 STERILE WATER/SALINE, 500 ML: HCPCS

## 2020-03-28 PROCEDURE — P9021 RED BLOOD CELLS UNIT: HCPCS

## 2020-03-28 PROCEDURE — A4663 DIALYSIS BLOOD PRESSURE CUFF: HCPCS

## 2020-03-28 PROCEDURE — 5A1955Z RESPIRATORY VENTILATION, GREATER THAN 96 CONSECUTIVE HOURS: ICD-10-PCS | Performed by: INTERNAL MEDICINE

## 2020-03-28 RX ADMIN — SODIUM HYPOCHLORITE SCH ML: 1.25 SOLUTION TOPICAL at 08:46

## 2020-03-28 RX ADMIN — LEVETIRACETAM SCH MG: 100 SOLUTION ORAL at 08:46

## 2020-03-28 RX ADMIN — FAMOTIDINE SCH MG: 20 TABLET, FILM COATED ORAL at 06:36

## 2020-03-28 RX ADMIN — ATORVASTATIN CALCIUM SCH MG: 20 TABLET, FILM COATED ORAL at 20:54

## 2020-03-28 RX ADMIN — Medication SCH EACH: at 20:54

## 2020-03-28 RX ADMIN — Medication SCH APPLIC: at 08:46

## 2020-03-28 RX ADMIN — VANCOMYCIN HYDROCHLORIDE SCH MG: 500 INJECTION, POWDER, LYOPHILIZED, FOR SOLUTION INTRAVENOUS at 14:11

## 2020-03-28 RX ADMIN — LACTOBACILLUS ACIDOPH-L.BULGARICUS 1 MILLION CELL CHEWABLE TABLET SCH TAB.CHEW: at 08:46

## 2020-03-28 RX ADMIN — ZINC SULFATE CAP 220 MG (50 MG ELEMENTAL ZN) SCH MG: 220 (50 ZN) CAP at 20:54

## 2020-03-28 RX ADMIN — MINERAL SUPPLEMENT IRON 300 MG / 5 ML STRENGTH LIQUID 100 PER BOX UNFLAVORED SCH MG: at 20:54

## 2020-03-28 RX ADMIN — OXYCODONE HYDROCHLORIDE AND ACETAMINOPHEN SCH MG: 500 TABLET ORAL at 20:54

## 2020-03-28 RX ADMIN — VANCOMYCIN HYDROCHLORIDE SCH MG: 500 INJECTION, POWDER, LYOPHILIZED, FOR SOLUTION INTRAVENOUS at 06:00

## 2020-03-28 RX ADMIN — Medication SCH GM: at 08:46

## 2020-03-28 RX ADMIN — LEVETIRACETAM SCH MG: 500 TABLET, FILM COATED ORAL at 20:54

## 2020-03-28 RX ADMIN — Medication SCH APPLIC: at 08:47

## 2020-03-28 RX ADMIN — Medication SCH ML: at 09:00

## 2020-03-28 RX ADMIN — Medication SCH ML: at 14:11

## 2020-03-28 RX ADMIN — ASCORBIC ACID TAB 250 MG SCH MG: 250 TAB at 08:46

## 2020-03-28 RX ADMIN — BACITRACIN ZINC, NEOMYCIN, POLYMYXIN B SCH PKT: 400; 3.5; 5 OINTMENT TOPICAL at 08:47

## 2020-03-28 RX ADMIN — Medication SCH GM: at 21:25

## 2020-03-28 RX ADMIN — Medication SCH EACH: at 06:39

## 2020-03-28 RX ADMIN — Medication SCH ML: at 06:39

## 2020-03-28 NOTE — NUR
REPORT GIVEN TO DELORIS LOUIS. REPORTED ALL CRITICAL FINDINGS AND ABNORMAL LABS ALREADY TO DR BAEZA.

## 2020-03-28 NOTE — NUR
RECEIVED REPORT FROM LETICIA LOUIS IN SUBACUTE, PATIENT IS ON VENTILATOR WITH SETTINGS AC12 TV 
450 FIO2 30%. SHILEY 8, GT TUBE CLAMPED AT THIS TIME UPON TRANSPORT NORMAL RECEIVES GLUCERNA 
1.2 @ 60ML/HR. PATIENT HAS +3 EDEMA GENERALIZED. RODRIGUES IS IN PLACE DRAINING YELLOW CLOUDY 
URINE. SACRAL WOUND NOTED AND PICTURE TAKEN RIGHT UPPER ARM PICC LINE IN PLACE DRESSING 
CHANGED.

## 2020-03-28 NOTE — NUR
Seen and examined by Dr Becker,no new orders noted.He wiil discuss with Dr Dwyer ,this 
pt needs to be transfer to Er <HE will need dialysis.

-------------------------------------------------------------------------------

Addendum: 03/28/20 at 1157 by LETICIA MOREJON RN

-------------------------------------------------------------------------------

she will need dialysis.

## 2020-03-28 NOTE — NUR
Dr Dwyer with new orders to transfer Pt to telemetry o [facility]fe for increase Bun 230,K 5.3.Spoke 
to Nurse Supervisor Ban,she will call me back when she has a Room.

## 2020-03-28 NOTE — NUR
DR PORTER FROM RADIOLOGY CALLED REPORTED SIGNIFICANT FINDINGS. THESE FINDINGS REPORTED TO DR BAEZA AND REPORT WILL BE POSTED AND FINALIZED PER DOCTOR PORTER IN 5 MINUTES.

## 2020-03-28 NOTE — NUR
New orders noted from Dr Dwyer,to transfer the Pt to ER for evaluation.

-------------------------------------------------------------------------------

Addendum: 03/28/20 at 1209 by LETICIA MOREJON RN

-------------------------------------------------------------------------------

Pt has increase Bun 230,K5.8

## 2020-03-28 NOTE — NUR
complet body check done Pt has generalized edema,sacral wound ,tx was done before 
transfer,picc line on the r upper arm triple lumen intact,flush each port with ns as 
ordered.

## 2020-03-29 VITALS — DIASTOLIC BLOOD PRESSURE: 91 MMHG | SYSTOLIC BLOOD PRESSURE: 144 MMHG

## 2020-03-29 VITALS — DIASTOLIC BLOOD PRESSURE: 65 MMHG | SYSTOLIC BLOOD PRESSURE: 126 MMHG

## 2020-03-29 VITALS — SYSTOLIC BLOOD PRESSURE: 144 MMHG | DIASTOLIC BLOOD PRESSURE: 71 MMHG

## 2020-03-29 VITALS — SYSTOLIC BLOOD PRESSURE: 146 MMHG | DIASTOLIC BLOOD PRESSURE: 65 MMHG

## 2020-03-29 VITALS — DIASTOLIC BLOOD PRESSURE: 68 MMHG | SYSTOLIC BLOOD PRESSURE: 130 MMHG

## 2020-03-29 VITALS — DIASTOLIC BLOOD PRESSURE: 65 MMHG | SYSTOLIC BLOOD PRESSURE: 129 MMHG

## 2020-03-29 VITALS — DIASTOLIC BLOOD PRESSURE: 77 MMHG | SYSTOLIC BLOOD PRESSURE: 124 MMHG

## 2020-03-29 VITALS — DIASTOLIC BLOOD PRESSURE: 89 MMHG | SYSTOLIC BLOOD PRESSURE: 137 MMHG

## 2020-03-29 VITALS — SYSTOLIC BLOOD PRESSURE: 124 MMHG | DIASTOLIC BLOOD PRESSURE: 65 MMHG

## 2020-03-29 LAB
ALP SERPL-CCNC: 143 U/L (ref 50–136)
ALT SERPL W/O P-5'-P-CCNC: 28 U/L (ref 14–59)
APPEARANCE UR: (no result)
AST SERPL-CCNC: 31 U/L (ref 15–37)
BASOPHILS # BLD AUTO: 0 K/UL (ref 0–8)
BASOPHILS NFR BLD AUTO: 0.2 % (ref 0–2)
BILIRUB SERPL-MCNC: 0.3 MG/DL (ref 0.2–1)
BILIRUB UR QL STRIP: NEGATIVE
BUN SERPL-MCNC: 230 MG/DL (ref 7–18)
CHLORIDE SERPL-SCNC: 108 MMOL/L (ref 98–107)
CHOLEST SERPL-MCNC: 51 MG/DL (ref ?–200)
CO2 SERPL-SCNC: 16 MMOL/L (ref 21–32)
COLOR UR: YELLOW
CREAT SERPL-MCNC: 2.9 MG/DL (ref 0.6–1.3)
DEPRECATED SQUAMOUS URNS QL MICRO: (no result) /HPF
EOSINOPHIL # BLD AUTO: 0.1 K/UL (ref 0–0.7)
EOSINOPHIL NFR BLD AUTO: 1.1 % (ref 0–7)
GLUCOSE SERPL-MCNC: 109 MG/DL (ref 74–106)
GLUCOSE UR STRIP-MCNC: NEGATIVE MG/DL
HCT VFR BLD AUTO: 26.5 % (ref 31.2–41.9)
HDLC SERPL-MCNC: 32 MG/DL (ref 40–60)
HEMOCCULT STL QL: NEGATIVE
HGB BLD-MCNC: 8.7 G/DL (ref 10.9–14.3)
HGB UR QL STRIP: (no result)
KETONES UR STRIP-MCNC: NEGATIVE MG/DL
LEUKOCYTE ESTERASE UR QL STRIP: (no result)
LYMPHOCYTES # BLD AUTO: 0.5 K/UL (ref 20–40)
LYMPHOCYTES NFR BLD AUTO: 6.3 % (ref 20.5–51.5)
MAGNESIUM SERPL-MCNC: 2.6 MG/DL (ref 1.8–2.4)
MCH RBC QN AUTO: 31.6 UUG (ref 24.7–32.8)
MCHC RBC AUTO-ENTMCNC: 33 G/DL (ref 32.3–35.6)
MCV RBC AUTO: 95.8 FL (ref 75.5–95.3)
MONOCYTES # BLD AUTO: 0.3 K/UL (ref 2–10)
MONOCYTES NFR BLD AUTO: 3.2 % (ref 0–11)
NEUTROPHILS # BLD AUTO: 7.7 K/UL (ref 1.8–8.9)
NEUTROPHILS NFR BLD AUTO: 89.2 % (ref 38.5–71.5)
NITRITE UR QL STRIP: POSITIVE
PH UR STRIP: 5 [PH] (ref 5–8)
PHOSPHATE SERPL-MCNC: 8.1 MG/DL (ref 2.5–4.9)
PLATELET # BLD AUTO: 317 K/UL (ref 179–408)
POTASSIUM SERPL-SCNC: 5.9 MMOL/L (ref 3.5–5.1)
RBC # BLD AUTO: 2.76 MIL/UL (ref 3.63–4.92)
RBC #/AREA URNS HPF: (no result) /HPF (ref 0–3)
SP GR UR STRIP: 1.01 (ref 1–1.03)
TRIGL SERPL-MCNC: 62 MG/DL (ref 30–150)
UROBILINOGEN UR STRIP-MCNC: 0.2 E.U./DL
WBC # BLD AUTO: 8.7 K/UL (ref 3.8–11.8)
WBC #/AREA URNS HPF: (no result) /HPF
WBC #/AREA URNS HPF: (no result) /HPF (ref 0–3)
WS STN SPEC: 6.4 G/DL (ref 6.4–8.2)
YEAST URNS QL MICRO: (no result) /HPF

## 2020-03-29 PROCEDURE — 30243N1 TRANSFUSION OF NONAUTOLOGOUS RED BLOOD CELLS INTO CENTRAL VEIN, PERCUTANEOUS APPROACH: ICD-10-PCS | Performed by: INTERNAL MEDICINE

## 2020-03-29 RX ADMIN — SODIUM CITRATE AND CITRIC ACID MONOHYDRATE SCH ML: 500; 334 SOLUTION ORAL at 20:17

## 2020-03-29 RX ADMIN — FAMOTIDINE SCH MG: 20 TABLET, FILM COATED ORAL at 05:51

## 2020-03-29 RX ADMIN — Medication SCH EACH: at 08:02

## 2020-03-29 RX ADMIN — BACITRACIN, NEOMYCIN, POLYMYXIN B SCH GM: 400; 3.5; 5 OINTMENT TOPICAL at 08:02

## 2020-03-29 RX ADMIN — ZINC SULFATE CAP 220 MG (50 MG ELEMENTAL ZN) SCH MG: 220 (50 ZN) CAP at 20:17

## 2020-03-29 RX ADMIN — DEXTROSE SCH MLS/HR: 50 INJECTION, SOLUTION INTRAVENOUS at 14:35

## 2020-03-29 RX ADMIN — THERA TABS SCH UDTAB: TAB at 05:51

## 2020-03-29 RX ADMIN — Medication SCH EACH: at 20:17

## 2020-03-29 RX ADMIN — THERA TABS SCH UDTAB: TAB at 17:02

## 2020-03-29 RX ADMIN — MINERAL SUPPLEMENT IRON 300 MG / 5 ML STRENGTH LIQUID 100 PER BOX UNFLAVORED SCH MG: at 20:17

## 2020-03-29 RX ADMIN — Medication SCH GM: at 20:17

## 2020-03-29 RX ADMIN — Medication SCH TAB: at 08:02

## 2020-03-29 RX ADMIN — SODIUM CITRATE AND CITRIC ACID MONOHYDRATE SCH ML: 500; 334 SOLUTION ORAL at 12:59

## 2020-03-29 RX ADMIN — LEVETIRACETAM SCH MG: 500 TABLET, FILM COATED ORAL at 20:17

## 2020-03-29 RX ADMIN — LEVETIRACETAM SCH MG: 500 TABLET, FILM COATED ORAL at 08:02

## 2020-03-29 RX ADMIN — VANCOMYCIN HYDROCHLORIDE SCH MG: 500 INJECTION, POWDER, LYOPHILIZED, FOR SOLUTION INTRAVENOUS at 14:35

## 2020-03-29 RX ADMIN — SODIUM CITRATE AND CITRIC ACID MONOHYDRATE SCH ML: 500; 334 SOLUTION ORAL at 16:30

## 2020-03-29 RX ADMIN — OXYCODONE HYDROCHLORIDE AND ACETAMINOPHEN SCH MG: 500 TABLET ORAL at 20:18

## 2020-03-29 RX ADMIN — Medication SCH ML: at 08:05

## 2020-03-29 RX ADMIN — Medication SCH ML: at 13:00

## 2020-03-29 RX ADMIN — OXYCODONE HYDROCHLORIDE AND ACETAMINOPHEN SCH MG: 500 TABLET ORAL at 08:02

## 2020-03-29 RX ADMIN — BACITRACIN, NEOMYCIN, POLYMYXIN B SCH GM: 400; 3.5; 5 OINTMENT TOPICAL at 20:19

## 2020-03-29 RX ADMIN — Medication SCH ML: at 21:19

## 2020-03-29 RX ADMIN — ATORVASTATIN CALCIUM SCH MG: 20 TABLET, FILM COATED ORAL at 20:17

## 2020-03-29 RX ADMIN — Medication SCH GM: at 08:04

## 2020-03-29 RX ADMIN — SODIUM CITRATE AND CITRIC ACID MONOHYDRATE SCH ML: 500; 334 SOLUTION ORAL at 09:09

## 2020-03-29 NOTE — NUR
Received patient lying in bed. Obtunded, non-verbal, flat affect. Sinus tachy on tele at 
102/min. Tracheostomy intact with vent. PICC line with triple lumen on right upper arm 
intact and patent. GT feeding ongoing and tolerated at this time. Lizarraga catheter intact and 
draining via gravity. Rectal tube intact. Seizure precaution observed. Safety measure 
initiated. Continue to monitor.

## 2020-03-29 NOTE — NUR
ENd of shift report

Patient rested well in between care; Rectal tube inserted per Dr Dwyer; dressing changed to 
sacral wound; trache care and  oral care done; tolerated PRBC transfusion; seizure 
precautions observed; suctioned oral and trache secretions; continue to monitor; continue 
plan of care.

## 2020-03-30 VITALS — DIASTOLIC BLOOD PRESSURE: 83 MMHG | SYSTOLIC BLOOD PRESSURE: 136 MMHG

## 2020-03-30 VITALS — DIASTOLIC BLOOD PRESSURE: 70 MMHG | SYSTOLIC BLOOD PRESSURE: 128 MMHG

## 2020-03-30 VITALS — SYSTOLIC BLOOD PRESSURE: 132 MMHG | DIASTOLIC BLOOD PRESSURE: 64 MMHG

## 2020-03-30 VITALS — SYSTOLIC BLOOD PRESSURE: 134 MMHG | DIASTOLIC BLOOD PRESSURE: 85 MMHG

## 2020-03-30 VITALS — DIASTOLIC BLOOD PRESSURE: 63 MMHG | SYSTOLIC BLOOD PRESSURE: 124 MMHG

## 2020-03-30 VITALS — DIASTOLIC BLOOD PRESSURE: 81 MMHG | SYSTOLIC BLOOD PRESSURE: 132 MMHG

## 2020-03-30 LAB
BUN SERPL-MCNC: 233 MG/DL (ref 7–18)
CHLORIDE SERPL-SCNC: 115 MMOL/L (ref 98–107)
CO2 SERPL-SCNC: 17 MMOL/L (ref 21–32)
CREAT SERPL-MCNC: 3 MG/DL (ref 0.6–1.3)
GLUCOSE SERPL-MCNC: 140 MG/DL (ref 74–106)
POTASSIUM SERPL-SCNC: 5.9 MMOL/L (ref 3.5–5.1)

## 2020-03-30 RX ADMIN — SODIUM CITRATE AND CITRIC ACID MONOHYDRATE SCH ML: 500; 334 SOLUTION ORAL at 13:50

## 2020-03-30 RX ADMIN — ZINC SULFATE CAP 220 MG (50 MG ELEMENTAL ZN) SCH MG: 220 (50 ZN) CAP at 20:36

## 2020-03-30 RX ADMIN — BACITRACIN, NEOMYCIN, POLYMYXIN B SCH GM: 400; 3.5; 5 OINTMENT TOPICAL at 08:39

## 2020-03-30 RX ADMIN — Medication SCH GM: at 08:39

## 2020-03-30 RX ADMIN — BACITRACIN, NEOMYCIN, POLYMYXIN B SCH APPLIC: 400; 3.5; 5 OINTMENT TOPICAL at 21:00

## 2020-03-30 RX ADMIN — Medication SCH EACH: at 08:30

## 2020-03-30 RX ADMIN — THERA TABS SCH UDTAB: TAB at 17:05

## 2020-03-30 RX ADMIN — Medication SCH GM: at 20:39

## 2020-03-30 RX ADMIN — Medication SCH TAB: at 08:30

## 2020-03-30 RX ADMIN — Medication SCH ML: at 21:01

## 2020-03-30 RX ADMIN — OXYCODONE HYDROCHLORIDE AND ACETAMINOPHEN SCH MG: 500 TABLET ORAL at 20:36

## 2020-03-30 RX ADMIN — MINERAL SUPPLEMENT IRON 300 MG / 5 ML STRENGTH LIQUID 100 PER BOX UNFLAVORED SCH MG: at 20:37

## 2020-03-30 RX ADMIN — LEVETIRACETAM SCH MG: 500 TABLET, FILM COATED ORAL at 08:31

## 2020-03-30 RX ADMIN — FLUCONAZOLE SCH MLS/HR: 200 INJECTION, SOLUTION INTRAVENOUS at 13:48

## 2020-03-30 RX ADMIN — VANCOMYCIN HYDROCHLORIDE SCH MG: 500 INJECTION, POWDER, LYOPHILIZED, FOR SOLUTION INTRAVENOUS at 00:52

## 2020-03-30 RX ADMIN — LEVETIRACETAM SCH MG: 500 TABLET, FILM COATED ORAL at 20:37

## 2020-03-30 RX ADMIN — SODIUM CITRATE AND CITRIC ACID MONOHYDRATE SCH ML: 500; 334 SOLUTION ORAL at 17:00

## 2020-03-30 RX ADMIN — Medication SCH EACH: at 20:37

## 2020-03-30 RX ADMIN — Medication SCH ML: at 05:16

## 2020-03-30 RX ADMIN — Medication SCH ML: at 05:17

## 2020-03-30 RX ADMIN — SODIUM CITRATE AND CITRIC ACID MONOHYDRATE SCH ML: 500; 334 SOLUTION ORAL at 20:37

## 2020-03-30 RX ADMIN — Medication SCH ML: at 13:53

## 2020-03-30 RX ADMIN — OXYCODONE HYDROCHLORIDE AND ACETAMINOPHEN SCH MG: 500 TABLET ORAL at 08:31

## 2020-03-30 RX ADMIN — DEXTROSE SCH MLS/HR: 50 INJECTION, SOLUTION INTRAVENOUS at 13:51

## 2020-03-30 RX ADMIN — FAMOTIDINE SCH MG: 20 TABLET, FILM COATED ORAL at 05:16

## 2020-03-30 RX ADMIN — VANCOMYCIN HYDROCHLORIDE SCH MG: 500 INJECTION, POWDER, LYOPHILIZED, FOR SOLUTION INTRAVENOUS at 13:50

## 2020-03-30 RX ADMIN — SODIUM CITRATE AND CITRIC ACID MONOHYDRATE SCH ML: 500; 334 SOLUTION ORAL at 08:30

## 2020-03-30 RX ADMIN — THERA TABS SCH UDTAB: TAB at 05:16

## 2020-03-30 NOTE — NUR
A call from Jayne MACIEL surgery consult for dialysis catheter placement the call regarding 
follow up Covid 19 results. NP updated and orders to cancel previous ct. orders.

## 2020-03-30 NOTE — NUR
Patient non-verbally responsive to tactile stimuli. Opens her eyes to stimuli. Affect flat. 
No signs or symptoms of pain or SOB. Sinus tachy on tele at 101/min. Tracheostomy intact 
with vent. Suction secretions PRN and able to obtain small amount of thick secretions. PICC 
line with triple lumen on right upper arm intact and patent. TKO. GT feeding/flushing well 
tolerated. Lizarraga catheter intact and draining via gravity. Rectal tube intact. Seizure 
precaution observed. No seizure episode noted. Turned and reposition q2hrs. Safety measure 
maintained.

## 2020-03-31 VITALS — DIASTOLIC BLOOD PRESSURE: 72 MMHG | SYSTOLIC BLOOD PRESSURE: 125 MMHG

## 2020-03-31 VITALS — DIASTOLIC BLOOD PRESSURE: 81 MMHG | SYSTOLIC BLOOD PRESSURE: 126 MMHG

## 2020-03-31 VITALS — DIASTOLIC BLOOD PRESSURE: 88 MMHG | SYSTOLIC BLOOD PRESSURE: 130 MMHG

## 2020-03-31 VITALS — DIASTOLIC BLOOD PRESSURE: 77 MMHG | SYSTOLIC BLOOD PRESSURE: 131 MMHG

## 2020-03-31 VITALS — DIASTOLIC BLOOD PRESSURE: 73 MMHG | SYSTOLIC BLOOD PRESSURE: 129 MMHG

## 2020-03-31 VITALS — DIASTOLIC BLOOD PRESSURE: 65 MMHG | SYSTOLIC BLOOD PRESSURE: 125 MMHG

## 2020-03-31 PROCEDURE — 06HD33Z INSERTION OF INFUSION DEVICE INTO LEFT COMMON ILIAC VEIN, PERCUTANEOUS APPROACH: ICD-10-PCS

## 2020-03-31 RX ADMIN — SODIUM CITRATE AND CITRIC ACID MONOHYDRATE SCH ML: 500; 334 SOLUTION ORAL at 12:29

## 2020-03-31 RX ADMIN — FAMOTIDINE SCH MG: 20 TABLET, FILM COATED ORAL at 05:53

## 2020-03-31 RX ADMIN — LEVETIRACETAM SCH MG: 500 TABLET, FILM COATED ORAL at 09:03

## 2020-03-31 RX ADMIN — THERA TABS SCH UDTAB: TAB at 05:53

## 2020-03-31 RX ADMIN — SODIUM CITRATE AND CITRIC ACID MONOHYDRATE SCH ML: 500; 334 SOLUTION ORAL at 16:56

## 2020-03-31 RX ADMIN — DEXTROSE SCH MLS/HR: 50 INJECTION, SOLUTION INTRAVENOUS at 13:36

## 2020-03-31 RX ADMIN — SODIUM CITRATE AND CITRIC ACID MONOHYDRATE SCH ML: 500; 334 SOLUTION ORAL at 09:03

## 2020-03-31 RX ADMIN — VANCOMYCIN HYDROCHLORIDE SCH MG: 500 INJECTION, POWDER, LYOPHILIZED, FOR SOLUTION INTRAVENOUS at 13:35

## 2020-03-31 RX ADMIN — Medication SCH EACH: at 09:03

## 2020-03-31 RX ADMIN — Medication SCH GM: at 09:05

## 2020-03-31 RX ADMIN — Medication SCH GM: at 21:11

## 2020-03-31 RX ADMIN — BACITRACIN, NEOMYCIN, POLYMYXIN B SCH APPLIC: 400; 3.5; 5 OINTMENT TOPICAL at 09:04

## 2020-03-31 RX ADMIN — Medication SCH TAB: at 09:03

## 2020-03-31 RX ADMIN — THERA TABS SCH UDTAB: TAB at 17:24

## 2020-03-31 RX ADMIN — OXYCODONE HYDROCHLORIDE AND ACETAMINOPHEN SCH MG: 500 TABLET ORAL at 21:09

## 2020-03-31 RX ADMIN — VANCOMYCIN HYDROCHLORIDE SCH MG: 500 INJECTION, POWDER, LYOPHILIZED, FOR SOLUTION INTRAVENOUS at 00:50

## 2020-03-31 RX ADMIN — ZINC SULFATE CAP 220 MG (50 MG ELEMENTAL ZN) SCH MG: 220 (50 ZN) CAP at 21:09

## 2020-03-31 RX ADMIN — Medication SCH EACH: at 21:09

## 2020-03-31 RX ADMIN — Medication SCH ML: at 21:11

## 2020-03-31 RX ADMIN — FLUCONAZOLE SCH MLS/HR: 200 INJECTION, SOLUTION INTRAVENOUS at 11:20

## 2020-03-31 RX ADMIN — Medication SCH ML: at 13:36

## 2020-03-31 RX ADMIN — OXYCODONE HYDROCHLORIDE AND ACETAMINOPHEN SCH MG: 500 TABLET ORAL at 09:03

## 2020-03-31 RX ADMIN — SODIUM CITRATE AND CITRIC ACID MONOHYDRATE SCH ML: 500; 334 SOLUTION ORAL at 21:09

## 2020-03-31 RX ADMIN — LEVETIRACETAM SCH MG: 500 TABLET, FILM COATED ORAL at 21:09

## 2020-03-31 RX ADMIN — BACITRACIN, NEOMYCIN, POLYMYXIN B SCH APPLIC: 400; 3.5; 5 OINTMENT TOPICAL at 21:11

## 2020-03-31 RX ADMIN — Medication SCH ML: at 05:55

## 2020-03-31 RX ADMIN — MINERAL SUPPLEMENT IRON 300 MG / 5 ML STRENGTH LIQUID 100 PER BOX UNFLAVORED SCH MG: at 21:09

## 2020-03-31 NOTE — NUR
PATIENT REASSIGNMENT RECEIVED PATIENT AT THIS TIME IN BED NON VERBAL BUT OPENS EYES HAS A 
TRACH AND VENTED WITH CONTINUOS SATS MONITORING AND ITS WNL.GT PATENT WITH GT IN TACT WITH 
FEEDINGS IN PROGRESS AS ORDERED WITH NO S/S OF REGURGITATION OR EMESIS RODRIGUES CATH AND RECTAL 
TUBE IS INTACT ON FIRST STEP BEBA FOR DECUB MANAGEMENT REPOSITIONED FOR COMFORT MADE 
COMFORTABLE WILL CONTINUE TO OBSERVE.

## 2020-03-31 NOTE — NUR
Trached patient was endorsed on Cook vent with ordered vent settings. She is trached with a 
Shiley 8 DCTm MLT used for cuff inflation/assessment. Alarms are on/audible. Spare trach and 
Ambu bag are at bedside. Will continue to monitor.

## 2020-03-31 NOTE — NUR
DR MOTTA HERE TO SEE PATIENT AND ORDERED HEMODIALYSIS SINCE PATIENT IS UNABLE TO SIGN AND 
HAS NO FAMILY DR BOX SIGNED THE CONSCENT

## 2020-03-31 NOTE — NUR
MIGUEL ANGEL SANCHEZ NP HERE AND SIGNED CONSCENT FOR SACRAL DEBRIDEMENT AND DIALYSIS CATHETER 
INSERTION.

## 2020-03-31 NOTE — NUR
rECIEVED PT LYING IN BED, OPENS EYES TO LIGHT PAIN BUT NON VERBAL. HR IS SR-ST, NO ECTOPICS. 
SKIN IS WARM AND DRY TO TOUCH. ALL EXTREMETIES ARE FLACCID. NO SEIZURES ACTIVITY NOTED AT 
THIS TIME. PT IS VENTED VIA TRACHE, SHILEY 8, SETTING OF AC-12, VT-450, FIO2-30%, PEEP-5. 
SATURATION IS 98%

## 2020-03-31 NOTE — NUR
Suction minimal thin white secretions via trache and orally. Afebrile. PiICC line intact on 
LUCRETIA. Pt has generalized pitting edema with third spacing. Tube feeding off at this time. 
Lizarraga draining clear yellow urine, and flexiseal intact and draining liquid brown stools. 
Repositioned for skin management. Planned wound debridement awaiting for consent to be 
signed.

## 2020-03-31 NOTE — NUR
NOTED SCANTY AMOUNT OF PINKISH DRAINAGE FROM THE LIAM CATH SITE WILL CONTINUE TO OBSERVE 
AND ENSURE THAT IT DID NOT INCREASE.

## 2020-03-31 NOTE — NUR
WOUND CARE CONSULT: PT FOLLOWED BY SURGICAL TEAM FOR WOUND CARE. DEFER TO SURGICAL TEAM FOR 
WOUND TREATMENT PLAN. PT ON FIRST STEP CIRRUS LOW AIRLOSS MATTRESS. ALL SKIN PROTECTION 
MEASURES IN PLACE AND DISCUSSED WITH NURSING STAFF. WILL SEE PRN.

## 2020-03-31 NOTE — NUR
DR GODINEZ AND WILLIAM COFFEY INSERTED LIAM CATH TO HER RIGHT GROIN AT THE BEDSIDE AND SHE 
TOLERATED PROCEDURE WELL MADE COMFORTABLE will continue to observe

## 2020-04-01 VITALS — SYSTOLIC BLOOD PRESSURE: 123 MMHG | DIASTOLIC BLOOD PRESSURE: 75 MMHG

## 2020-04-01 VITALS — SYSTOLIC BLOOD PRESSURE: 142 MMHG | DIASTOLIC BLOOD PRESSURE: 82 MMHG

## 2020-04-01 VITALS — DIASTOLIC BLOOD PRESSURE: 74 MMHG | SYSTOLIC BLOOD PRESSURE: 124 MMHG

## 2020-04-01 VITALS — SYSTOLIC BLOOD PRESSURE: 144 MMHG | DIASTOLIC BLOOD PRESSURE: 81 MMHG

## 2020-04-01 VITALS — SYSTOLIC BLOOD PRESSURE: 127 MMHG | DIASTOLIC BLOOD PRESSURE: 83 MMHG

## 2020-04-01 LAB
BASE EXCESS BLDA CALC-SCNC: -9.7 MMOL/L
BASOPHILS # BLD AUTO: 0 K/UL (ref 0–8)
BASOPHILS NFR BLD AUTO: 0.2 % (ref 0–2)
BUN SERPL-MCNC: 245 MG/DL (ref 7–18)
CHLORIDE SERPL-SCNC: 110 MMOL/L (ref 98–107)
CO2 SERPL-SCNC: 20 MMOL/L (ref 21–32)
CREAT SERPL-MCNC: 3.4 MG/DL (ref 0.6–1.3)
EOSINOPHIL # BLD AUTO: 0 K/UL (ref 0–0.7)
EOSINOPHIL NFR BLD AUTO: 0.2 % (ref 0–7)
GLUCOSE SERPL-MCNC: 129 MG/DL (ref 74–106)
HCO3 BLDA-SCNC: 15.2 MMOL/L
HCT VFR BLD AUTO: 26.6 % (ref 31.2–41.9)
HGB BLD-MCNC: 8.6 G/DL (ref 10.9–14.3)
HGB BLDA OXIMETRY-MCNC: 10.8 G/DL (ref 12–16)
INHALED O2 CONCENTRATION: 30 %
INTRINSIC PEEP RESPIRATORY: 5 CMH20
LYMPHOCYTES # BLD AUTO: 0.4 K/UL (ref 20–40)
LYMPHOCYTES NFR BLD AUTO: 4.6 % (ref 20.5–51.5)
MAGNESIUM SERPL-MCNC: 2.7 MG/DL (ref 1.8–2.4)
MCH RBC QN AUTO: 31.7 UUG (ref 24.7–32.8)
MCHC RBC AUTO-ENTMCNC: 33 G/DL (ref 32.3–35.6)
MCV RBC AUTO: 97.7 FL (ref 75.5–95.3)
MONOCYTES # BLD AUTO: 0.3 K/UL (ref 2–10)
MONOCYTES NFR BLD AUTO: 2.9 % (ref 0–11)
NEUTROPHILS # BLD AUTO: 8.7 K/UL (ref 1.8–8.9)
NEUTROPHILS NFR BLD AUTO: 92.1 % (ref 38.5–71.5)
PCO2 TEMP ADJ BLDA: 30.2 MMHG (ref 35–45)
PEEP SETTING VENT: 450 ML
PH TEMP ADJ BLDA: 7.32 [PH] (ref 7.35–7.45)
PHOSPHATE SERPL-MCNC: 10.1 MG/DL (ref 2.5–4.9)
PLATELET # BLD AUTO: 360 K/UL (ref 179–408)
PO2 TEMP ADJ BLDA: 86 MMHG (ref 75–100)
POTASSIUM SERPL-SCNC: 5.4 MMOL/L (ref 3.5–5.1)
RBC # BLD AUTO: 2.72 MIL/UL (ref 3.63–4.92)
SET RATE, BG: 12
SPECIMEN DRAWN FROM PATIENT: (no result)
VENTILATION MODE VENT: (no result)
WBC # BLD AUTO: 9.4 K/UL (ref 3.8–11.8)

## 2020-04-01 RX ADMIN — Medication SCH EACH: at 21:37

## 2020-04-01 RX ADMIN — Medication SCH ML: at 05:26

## 2020-04-01 RX ADMIN — OXYCODONE HYDROCHLORIDE AND ACETAMINOPHEN SCH MG: 500 TABLET ORAL at 21:38

## 2020-04-01 RX ADMIN — CARVEDILOL SCH MG: 6.25 TABLET, FILM COATED ORAL at 13:41

## 2020-04-01 RX ADMIN — FAMOTIDINE SCH MG: 20 TABLET, FILM COATED ORAL at 05:26

## 2020-04-01 RX ADMIN — SODIUM HYPOCHLORITE SCH ML: 1.25 SOLUTION TOPICAL at 08:08

## 2020-04-01 RX ADMIN — Medication SCH GM: at 21:38

## 2020-04-01 RX ADMIN — BACITRACIN, NEOMYCIN, POLYMYXIN B SCH APPLIC: 400; 3.5; 5 OINTMENT TOPICAL at 08:08

## 2020-04-01 RX ADMIN — CARVEDILOL SCH MG: 6.25 TABLET, FILM COATED ORAL at 17:00

## 2020-04-01 RX ADMIN — Medication SCH EACH: at 08:11

## 2020-04-01 RX ADMIN — PIPERACILLIN SODIUM AND TAZOBACTAM SODIUM SCH MLS/HR: .375; 3 INJECTION, POWDER, LYOPHILIZED, FOR SOLUTION INTRAVENOUS at 18:31

## 2020-04-01 RX ADMIN — Medication SCH ML: at 14:23

## 2020-04-01 RX ADMIN — VANCOMYCIN HYDROCHLORIDE SCH MG: 500 INJECTION, POWDER, LYOPHILIZED, FOR SOLUTION INTRAVENOUS at 13:53

## 2020-04-01 RX ADMIN — ZINC SULFATE CAP 220 MG (50 MG ELEMENTAL ZN) SCH MG: 220 (50 ZN) CAP at 21:38

## 2020-04-01 RX ADMIN — SODIUM HYPOCHLORITE SCH ML: 1.25 SOLUTION TOPICAL at 01:00

## 2020-04-01 RX ADMIN — Medication SCH ML: at 21:39

## 2020-04-01 RX ADMIN — Medication SCH TAB: at 08:07

## 2020-04-01 RX ADMIN — THERA TABS SCH UDTAB: TAB at 05:25

## 2020-04-01 RX ADMIN — LEVETIRACETAM SCH MG: 500 TABLET, FILM COATED ORAL at 21:37

## 2020-04-01 RX ADMIN — SODIUM CITRATE AND CITRIC ACID MONOHYDRATE SCH ML: 500; 334 SOLUTION ORAL at 08:07

## 2020-04-01 RX ADMIN — SODIUM CITRATE AND CITRIC ACID MONOHYDRATE SCH ML: 500; 334 SOLUTION ORAL at 13:41

## 2020-04-01 RX ADMIN — RENAGEL SCH MG: 800 TABLET ORAL at 18:31

## 2020-04-01 RX ADMIN — SODIUM CITRATE AND CITRIC ACID MONOHYDRATE SCH ML: 500; 334 SOLUTION ORAL at 16:59

## 2020-04-01 RX ADMIN — VANCOMYCIN HYDROCHLORIDE SCH MG: 500 INJECTION, POWDER, LYOPHILIZED, FOR SOLUTION INTRAVENOUS at 02:27

## 2020-04-01 RX ADMIN — OXYCODONE HYDROCHLORIDE AND ACETAMINOPHEN SCH MG: 500 TABLET ORAL at 08:07

## 2020-04-01 RX ADMIN — THERA TABS SCH UDTAB: TAB at 17:00

## 2020-04-01 RX ADMIN — FLUCONAZOLE SCH MLS/HR: 200 INJECTION, SOLUTION INTRAVENOUS at 10:59

## 2020-04-01 RX ADMIN — Medication SCH ML: at 10:58

## 2020-04-01 RX ADMIN — Medication SCH GM: at 08:13

## 2020-04-01 RX ADMIN — DEXTROSE SCH MLS/HR: 50 INJECTION, SOLUTION INTRAVENOUS at 14:23

## 2020-04-01 RX ADMIN — LEVETIRACETAM SCH MG: 500 TABLET, FILM COATED ORAL at 08:07

## 2020-04-01 RX ADMIN — BACITRACIN, NEOMYCIN, POLYMYXIN B SCH APPLIC: 400; 3.5; 5 OINTMENT TOPICAL at 21:38

## 2020-04-01 NOTE — NUR
Pt remained stable; repositioned q2h; suctioned secretions; dressing to sacrum done; PICC 
line and dialysis cath  dressing changed aseptically;  tolerated GTF;

## 2020-04-01 NOTE — NUR
PATIENT WITH STEADY VITAL SIGNS UPON  INITIAL ASSESSMENT; PATIENT SCHEDULED DIALYSIS UPON 
BEGINNING OF DIALYSIS PATIENT BECAME VERY TACHYCARDIC WITH HEART RATE AROUND 150-160 AND 
HIGH BLOOD PRESSURE;DIALYSIS NURSE THEN CONTACTED NEPHROLOGIST AND HE REQUESTED TO BE 
MONITORED AND POTENTIALLY STOP DIALYSIS ; DIALYSIS NURSE STOPED DIALYSIS AS PATIENT VERY 
TACHYCARDIC. CARDIOLOGIST NOTIFIED AS PATIENT SEEMED TO BE IN A-FIB AND TACHY AND HIGH BLOOD 
PRESSURE AND MD PLACED ORDERS FOR METOPROLOL AND COREG AND LASIX ; PATIENT BLOOD PRESSURE 
AND PULSE STABLED OUT. PATIENT UPON DEPARTURE WITH STABLE VITAL SIGNS AND NO SIGNS OF 
DISTRESS.

## 2020-04-01 NOTE — NUR
Received patient in bed, arouses to touch, nonverbal. No acute distress noted. Patient is on 
a ventilator, PICC line on right upper arm is intact and patent. Patient on g-tube feeding 
at 60ml/hr, tolerating well. Lizarraga and rectal tube are intact. Patient on air loss mattress. 
Vitals are WNL. Will turn and reposition Q2H. Safety measures initiated Will continue to 
monitor.

## 2020-04-02 VITALS — DIASTOLIC BLOOD PRESSURE: 77 MMHG | SYSTOLIC BLOOD PRESSURE: 132 MMHG

## 2020-04-02 VITALS — SYSTOLIC BLOOD PRESSURE: 137 MMHG | DIASTOLIC BLOOD PRESSURE: 86 MMHG

## 2020-04-02 VITALS — DIASTOLIC BLOOD PRESSURE: 54 MMHG | SYSTOLIC BLOOD PRESSURE: 105 MMHG

## 2020-04-02 VITALS — DIASTOLIC BLOOD PRESSURE: 83 MMHG | SYSTOLIC BLOOD PRESSURE: 114 MMHG

## 2020-04-02 VITALS — DIASTOLIC BLOOD PRESSURE: 77 MMHG | SYSTOLIC BLOOD PRESSURE: 119 MMHG

## 2020-04-02 VITALS — DIASTOLIC BLOOD PRESSURE: 72 MMHG | SYSTOLIC BLOOD PRESSURE: 112 MMHG

## 2020-04-02 VITALS — SYSTOLIC BLOOD PRESSURE: 105 MMHG | DIASTOLIC BLOOD PRESSURE: 79 MMHG

## 2020-04-02 LAB
BASOPHILS # BLD AUTO: 0 K/UL (ref 0–8)
BASOPHILS NFR BLD AUTO: 0.1 % (ref 0–2)
BUN SERPL-MCNC: 213 MG/DL (ref 7–18)
CHLORIDE SERPL-SCNC: 109 MMOL/L (ref 98–107)
CO2 SERPL-SCNC: 23 MMOL/L (ref 21–32)
CREAT SERPL-MCNC: 3.2 MG/DL (ref 0.6–1.3)
EOSINOPHIL # BLD AUTO: 0.1 K/UL (ref 0–0.7)
EOSINOPHIL NFR BLD AUTO: 0.7 % (ref 0–7)
GLUCOSE SERPL-MCNC: 144 MG/DL (ref 74–106)
HCT VFR BLD AUTO: 25.1 % (ref 31.2–41.9)
HGB BLD-MCNC: 8.2 G/DL (ref 10.9–14.3)
LYMPHOCYTES # BLD AUTO: 0.6 K/UL (ref 20–40)
LYMPHOCYTES NFR BLD AUTO: 7 % (ref 20.5–51.5)
MAGNESIUM SERPL-MCNC: 2.6 MG/DL (ref 1.8–2.4)
MCH RBC QN AUTO: 31.9 UUG (ref 24.7–32.8)
MCHC RBC AUTO-ENTMCNC: 33 G/DL (ref 32.3–35.6)
MCV RBC AUTO: 97.5 FL (ref 75.5–95.3)
MONOCYTES # BLD AUTO: 0.3 K/UL (ref 2–10)
MONOCYTES NFR BLD AUTO: 3.5 % (ref 0–11)
NEUTROPHILS # BLD AUTO: 7.2 K/UL (ref 1.8–8.9)
NEUTROPHILS NFR BLD AUTO: 88.7 % (ref 38.5–71.5)
PHOSPHATE SERPL-MCNC: 9.4 MG/DL (ref 2.5–4.9)
PLATELET # BLD AUTO: 290 K/UL (ref 179–408)
POTASSIUM SERPL-SCNC: 4.9 MMOL/L (ref 3.5–5.1)
RBC # BLD AUTO: 2.58 MIL/UL (ref 3.63–4.92)
WBC # BLD AUTO: 8.1 K/UL (ref 3.8–11.8)

## 2020-04-02 PROCEDURE — 0KBN0ZZ EXCISION OF RIGHT HIP MUSCLE, OPEN APPROACH: ICD-10-PCS

## 2020-04-02 PROCEDURE — 0KBP0ZZ EXCISION OF LEFT HIP MUSCLE, OPEN APPROACH: ICD-10-PCS

## 2020-04-02 PROCEDURE — 5A1D70Z PERFORMANCE OF URINARY FILTRATION, INTERMITTENT, LESS THAN 6 HOURS PER DAY: ICD-10-PCS

## 2020-04-02 RX ADMIN — Medication SCH ML: at 05:38

## 2020-04-02 RX ADMIN — SODIUM HYPOCHLORITE SCH ML: 1.25 SOLUTION TOPICAL at 12:47

## 2020-04-02 RX ADMIN — Medication SCH ML: at 05:37

## 2020-04-02 RX ADMIN — Medication SCH GM: at 21:14

## 2020-04-02 RX ADMIN — CARVEDILOL SCH MG: 6.25 TABLET, FILM COATED ORAL at 10:05

## 2020-04-02 RX ADMIN — Medication SCH EACH: at 10:00

## 2020-04-02 RX ADMIN — OXYCODONE HYDROCHLORIDE AND ACETAMINOPHEN SCH MG: 500 TABLET ORAL at 10:01

## 2020-04-02 RX ADMIN — Medication SCH ML: at 21:14

## 2020-04-02 RX ADMIN — OXYCODONE HYDROCHLORIDE AND ACETAMINOPHEN SCH MG: 500 TABLET ORAL at 21:14

## 2020-04-02 RX ADMIN — Medication SCH ML: at 14:04

## 2020-04-02 RX ADMIN — FLUCONAZOLE SCH MLS/HR: 200 INJECTION, SOLUTION INTRAVENOUS at 10:02

## 2020-04-02 RX ADMIN — VANCOMYCIN HYDROCHLORIDE SCH MG: 500 INJECTION, POWDER, LYOPHILIZED, FOR SOLUTION INTRAVENOUS at 12:37

## 2020-04-02 RX ADMIN — BACITRACIN, NEOMYCIN, POLYMYXIN B SCH APPLIC: 400; 3.5; 5 OINTMENT TOPICAL at 14:04

## 2020-04-02 RX ADMIN — ALBUMIN HUMAN SCH MLS/HR: 250 SOLUTION INTRAVENOUS at 18:29

## 2020-04-02 RX ADMIN — PIPERACILLIN SODIUM AND TAZOBACTAM SODIUM SCH MLS/HR: .375; 3 INJECTION, POWDER, LYOPHILIZED, FOR SOLUTION INTRAVENOUS at 05:36

## 2020-04-02 RX ADMIN — THERA TABS SCH UDTAB: TAB at 18:20

## 2020-04-02 RX ADMIN — Medication SCH TAB: at 10:00

## 2020-04-02 RX ADMIN — CARVEDILOL SCH MG: 6.25 TABLET, FILM COATED ORAL at 18:00

## 2020-04-02 RX ADMIN — VANCOMYCIN HYDROCHLORIDE SCH MG: 500 INJECTION, POWDER, LYOPHILIZED, FOR SOLUTION INTRAVENOUS at 01:45

## 2020-04-02 RX ADMIN — LEVETIRACETAM SCH MG: 500 TABLET, FILM COATED ORAL at 10:00

## 2020-04-02 RX ADMIN — BACITRACIN, NEOMYCIN, POLYMYXIN B SCH APPLIC: 400; 3.5; 5 OINTMENT TOPICAL at 21:15

## 2020-04-02 RX ADMIN — RENAGEL SCH MG: 800 TABLET ORAL at 12:37

## 2020-04-02 RX ADMIN — PIPERACILLIN SODIUM AND TAZOBACTAM SODIUM SCH MLS/HR: .375; 3 INJECTION, POWDER, LYOPHILIZED, FOR SOLUTION INTRAVENOUS at 19:42

## 2020-04-02 RX ADMIN — THERA TABS SCH UDTAB: TAB at 05:37

## 2020-04-02 RX ADMIN — LEVETIRACETAM SCH MG: 500 TABLET, FILM COATED ORAL at 21:13

## 2020-04-02 RX ADMIN — Medication SCH EACH: at 21:13

## 2020-04-02 RX ADMIN — FAMOTIDINE SCH MG: 20 TABLET, FILM COATED ORAL at 05:37

## 2020-04-02 RX ADMIN — Medication SCH GM: at 10:01

## 2020-04-02 RX ADMIN — RENAGEL SCH MG: 800 TABLET ORAL at 18:20

## 2020-04-02 RX ADMIN — ZINC SULFATE CAP 220 MG (50 MG ELEMENTAL ZN) SCH MG: 220 (50 ZN) CAP at 21:14

## 2020-04-02 RX ADMIN — RENAGEL SCH MG: 800 TABLET ORAL at 10:00

## 2020-04-02 NOTE — NUR
Dialysis RN stated he did not remove any fluid just cleaned blood. Pt. has BP 85/56 HR 90. 
Checked 3 times all ranging from 84-86/55-58. NP Rosamaria aware. New order of albumin 5% x1. 
Will input order.

## 2020-04-02 NOTE — NUR
Turned and repositioned patient and noted large amount of stool in chucks, cleaned and 
inserted new rectal tube, performed dressing change to sacral wound.

## 2020-04-02 NOTE — NUR
received pt. resting in bed on air mattress. Pt. has R  picc line triple lumen. Pt. is on 
ventilator rate of 12, tidal volume 450, peep of 5, and FiO2 30%. Pt. is on tele monitor 
normal sinus rhythm HR 89. Pt. has R madhav femoral. Lizarraga catheter in place. Rectal tube 
in place. Pt. is on contact isolation for cdiff. safety measures in place. call light within 
reach. will continue to monitor pt.

## 2020-04-02 NOTE — NUR
PT RECEIVED ON OBREGON VENT WITH SETTINGS OF AC 12, , +5, FIO2 30%. PT HAS A SHILEY 8 DCT 
TRACH. AMBU BAG AND BACK UP TRACH ARE AT BEDSIDE AT THIS TIME. NO S/S OF RESPIRATORY 
DISTRESS. HME CHANGED. SUCTION AS NEEDED. PROPER PPE USED. WILL CONTINUE TO MONITOR.

## 2020-04-02 NOTE — NUR
Pt received on [employer] Cook on settings of AC 12, , PEEP +5 and FIO2-30%. Shiley 8 is 
patent and secure; B/U Shiley 8 and BVM are at bedside. No resp. distress noted at this 
time. Pt to be monitored and PRN SX throughout the shift. Cook alarm parameters have been 
checked and remain audible

## 2020-04-02 NOTE — NUR
Removed rectal tube with charge RN as pt.'s stool is becoming more formed. Pt. has had 2 
episodes of BM. RT at bedside routinely. PA debrided pt.'s sacral wound at bedside.

## 2020-04-03 VITALS — DIASTOLIC BLOOD PRESSURE: 81 MMHG | SYSTOLIC BLOOD PRESSURE: 116 MMHG

## 2020-04-03 VITALS — SYSTOLIC BLOOD PRESSURE: 151 MMHG | DIASTOLIC BLOOD PRESSURE: 94 MMHG

## 2020-04-03 VITALS — DIASTOLIC BLOOD PRESSURE: 85 MMHG | SYSTOLIC BLOOD PRESSURE: 120 MMHG

## 2020-04-03 VITALS — DIASTOLIC BLOOD PRESSURE: 71 MMHG | SYSTOLIC BLOOD PRESSURE: 125 MMHG

## 2020-04-03 LAB
BASOPHILS # BLD AUTO: 0 K/UL (ref 0–8)
BASOPHILS NFR BLD AUTO: 0.1 % (ref 0–2)
BUN SERPL-MCNC: 165 MG/DL (ref 7–18)
CHLORIDE SERPL-SCNC: 109 MMOL/L (ref 98–107)
CO2 SERPL-SCNC: 25 MMOL/L (ref 21–32)
CREAT SERPL-MCNC: 2.5 MG/DL (ref 0.6–1.3)
EOSINOPHIL # BLD AUTO: 0 K/UL (ref 0–0.7)
EOSINOPHIL NFR BLD AUTO: 0.5 % (ref 0–7)
EOSINOPHIL NFR BLD MANUAL: 1 % (ref 0–8)
GLUCOSE SERPL-MCNC: 141 MG/DL (ref 74–106)
HCT VFR BLD AUTO: 27.3 % (ref 31.2–41.9)
HGB BLD-MCNC: 8.8 G/DL (ref 10.9–14.3)
LYMPHOCYTES # BLD AUTO: 0.5 K/UL (ref 20–40)
LYMPHOCYTES NFR BLD AUTO: 6.5 % (ref 20.5–51.5)
LYMPHOCYTES NFR BLD MANUAL: 9 % (ref 20–40)
MAGNESIUM SERPL-MCNC: 2.4 MG/DL (ref 1.8–2.4)
MCH RBC QN AUTO: 32 UUG (ref 24.7–32.8)
MCHC RBC AUTO-ENTMCNC: 32 G/DL (ref 32.3–35.6)
MCV RBC AUTO: 98.6 FL (ref 75.5–95.3)
MONOCYTES # BLD AUTO: 0.4 K/UL (ref 2–10)
MONOCYTES NFR BLD AUTO: 4.6 % (ref 0–11)
MONOCYTES NFR BLD MANUAL: 3 % (ref 2–10)
NEUTROPHILS # BLD AUTO: 6.8 K/UL (ref 1.8–8.9)
NEUTROPHILS NFR BLD AUTO: 88.3 % (ref 38.5–71.5)
NEUTS BAND NFR BLD MANUAL: 1 % (ref 0–10)
NEUTS SEG NFR BLD MANUAL: 86 % (ref 42–75)
PHOSPHATE SERPL-MCNC: 7.8 MG/DL (ref 2.5–4.9)
PLATELET # BLD AUTO: 249 K/UL (ref 179–408)
POTASSIUM SERPL-SCNC: 4.5 MMOL/L (ref 3.5–5.1)
RBC # BLD AUTO: 2.77 MIL/UL (ref 3.63–4.92)
WBC # BLD AUTO: 7.7 K/UL (ref 3.8–11.8)
WBC NRBC COR # BLD AUTO: 1 /100WBC

## 2020-04-03 RX ADMIN — VANCOMYCIN HYDROCHLORIDE SCH MG: 500 INJECTION, POWDER, LYOPHILIZED, FOR SOLUTION INTRAVENOUS at 01:05

## 2020-04-03 RX ADMIN — THERA TABS SCH UDTAB: TAB at 05:02

## 2020-04-03 RX ADMIN — Medication SCH ML: at 05:02

## 2020-04-03 RX ADMIN — LEVETIRACETAM SCH MG: 500 TABLET, FILM COATED ORAL at 21:15

## 2020-04-03 RX ADMIN — RENAGEL SCH MG: 800 TABLET ORAL at 13:28

## 2020-04-03 RX ADMIN — ALBUMIN HUMAN SCH MLS/HR: 250 SOLUTION INTRAVENOUS at 00:52

## 2020-04-03 RX ADMIN — Medication SCH ML: at 04:14

## 2020-04-03 RX ADMIN — ALBUMIN HUMAN SCH MLS/HR: 250 SOLUTION INTRAVENOUS at 05:03

## 2020-04-03 RX ADMIN — ALBUMIN HUMAN SCH MLS/HR: 250 SOLUTION INTRAVENOUS at 13:29

## 2020-04-03 RX ADMIN — VANCOMYCIN HYDROCHLORIDE SCH MG: 500 INJECTION, POWDER, LYOPHILIZED, FOR SOLUTION INTRAVENOUS at 13:28

## 2020-04-03 RX ADMIN — LEVETIRACETAM SCH MG: 500 TABLET, FILM COATED ORAL at 11:25

## 2020-04-03 RX ADMIN — FAMOTIDINE SCH MG: 20 TABLET, FILM COATED ORAL at 05:02

## 2020-04-03 RX ADMIN — Medication SCH ML: at 21:28

## 2020-04-03 RX ADMIN — Medication SCH EACH: at 11:25

## 2020-04-03 RX ADMIN — Medication SCH ML: at 13:30

## 2020-04-03 RX ADMIN — RENAGEL SCH MG: 800 TABLET ORAL at 17:28

## 2020-04-03 RX ADMIN — THERA TABS SCH UDTAB: TAB at 17:28

## 2020-04-03 RX ADMIN — Medication SCH GM: at 21:28

## 2020-04-03 RX ADMIN — SODIUM HYPOCHLORITE SCH ML: 1.25 SOLUTION TOPICAL at 11:26

## 2020-04-03 RX ADMIN — FLUCONAZOLE SCH MLS/HR: 200 INJECTION, SOLUTION INTRAVENOUS at 11:41

## 2020-04-03 RX ADMIN — Medication SCH EACH: at 21:15

## 2020-04-03 RX ADMIN — PIPERACILLIN SODIUM AND TAZOBACTAM SODIUM SCH MLS/HR: .375; 3 INJECTION, POWDER, LYOPHILIZED, FOR SOLUTION INTRAVENOUS at 06:21

## 2020-04-03 RX ADMIN — ZINC SULFATE CAP 220 MG (50 MG ELEMENTAL ZN) SCH MG: 220 (50 ZN) CAP at 21:15

## 2020-04-03 RX ADMIN — DEXTROSE SCH MLS/HR: 50 INJECTION, SOLUTION INTRAVENOUS at 17:28

## 2020-04-03 RX ADMIN — OXYCODONE HYDROCHLORIDE AND ACETAMINOPHEN SCH MG: 500 TABLET ORAL at 21:15

## 2020-04-03 RX ADMIN — CARVEDILOL SCH MG: 6.25 TABLET, FILM COATED ORAL at 11:34

## 2020-04-03 RX ADMIN — Medication SCH TAB: at 11:26

## 2020-04-03 RX ADMIN — OXYCODONE HYDROCHLORIDE AND ACETAMINOPHEN SCH MG: 500 TABLET ORAL at 11:25

## 2020-04-03 RX ADMIN — RENAGEL SCH MG: 800 TABLET ORAL at 11:25

## 2020-04-03 RX ADMIN — CARVEDILOL SCH MG: 6.25 TABLET, FILM COATED ORAL at 17:30

## 2020-04-03 RX ADMIN — Medication SCH GM: at 11:35

## 2020-04-03 NOTE — NUR
dialysis nurse at bedside. Received pt. resting in bed on air mattress. Pt. has R UA picc 
line triple lumen only 2 lumens flushable. Pt. is on ventilator. Pt. is on tele monitor 
normal sinus rhythm. Pt. has R madhav femoral catheter. Lizarraga catheter in place. G tube in 
place. Pt. is on contact isolation for cdiff. safety measures in place. call light within 
reach. will continue to monitor pt.

## 2020-04-03 NOTE — NUR
Patient had x2 BM. No distress noted. Medication given as ordered. Vitals WNL. NSR on the 
monitor in the 80s. Slight bleeding noted from debridement, reinforced dressing. Safety 
measures given. Will endorse to next shift.

## 2020-04-04 VITALS — DIASTOLIC BLOOD PRESSURE: 77 MMHG | SYSTOLIC BLOOD PRESSURE: 128 MMHG

## 2020-04-04 VITALS — DIASTOLIC BLOOD PRESSURE: 98 MMHG | SYSTOLIC BLOOD PRESSURE: 169 MMHG

## 2020-04-04 VITALS — DIASTOLIC BLOOD PRESSURE: 83 MMHG | SYSTOLIC BLOOD PRESSURE: 140 MMHG

## 2020-04-04 VITALS — SYSTOLIC BLOOD PRESSURE: 156 MMHG | DIASTOLIC BLOOD PRESSURE: 104 MMHG

## 2020-04-04 VITALS — SYSTOLIC BLOOD PRESSURE: 148 MMHG | DIASTOLIC BLOOD PRESSURE: 91 MMHG

## 2020-04-04 LAB
BASOPHILS # BLD AUTO: 0 K/UL (ref 0–8)
BASOPHILS NFR BLD AUTO: 0 % (ref 0–2)
BUN SERPL-MCNC: 132 MG/DL (ref 7–18)
CHLORIDE SERPL-SCNC: 109 MMOL/L (ref 98–107)
CO2 SERPL-SCNC: 23 MMOL/L (ref 21–32)
CREAT SERPL-MCNC: 2.2 MG/DL (ref 0.6–1.3)
EOSINOPHIL # BLD AUTO: 0.1 K/UL (ref 0–0.7)
EOSINOPHIL NFR BLD AUTO: 1.1 % (ref 0–7)
GLUCOSE SERPL-MCNC: 154 MG/DL (ref 74–106)
HCT VFR BLD AUTO: 25.1 % (ref 31.2–41.9)
HGB BLD-MCNC: 8.1 G/DL (ref 10.9–14.3)
LYMPHOCYTES # BLD AUTO: 0.7 K/UL (ref 20–40)
LYMPHOCYTES NFR BLD AUTO: 7.5 % (ref 20.5–51.5)
MAGNESIUM SERPL-MCNC: 2.4 MG/DL (ref 1.8–2.4)
MCH RBC QN AUTO: 31.9 UUG (ref 24.7–32.8)
MCHC RBC AUTO-ENTMCNC: 32 G/DL (ref 32.3–35.6)
MCV RBC AUTO: 99.2 FL (ref 75.5–95.3)
MONOCYTES # BLD AUTO: 0.4 K/UL (ref 2–10)
MONOCYTES NFR BLD AUTO: 4.3 % (ref 0–11)
NEUTROPHILS # BLD AUTO: 8.5 K/UL (ref 1.8–8.9)
NEUTROPHILS NFR BLD AUTO: 87.1 % (ref 38.5–71.5)
PHOSPHATE SERPL-MCNC: 7 MG/DL (ref 2.5–4.9)
PLATELET # BLD AUTO: 232 K/UL (ref 179–408)
POTASSIUM SERPL-SCNC: 4.3 MMOL/L (ref 3.5–5.1)
RBC # BLD AUTO: 2.53 MIL/UL (ref 3.63–4.92)
WBC # BLD AUTO: 9.8 K/UL (ref 3.8–11.8)

## 2020-04-04 RX ADMIN — Medication SCH TAB: at 11:16

## 2020-04-04 RX ADMIN — SODIUM HYPOCHLORITE SCH ML: 1.25 SOLUTION TOPICAL at 11:31

## 2020-04-04 RX ADMIN — OXYCODONE HYDROCHLORIDE AND ACETAMINOPHEN SCH MG: 500 TABLET ORAL at 11:16

## 2020-04-04 RX ADMIN — RENAGEL SCH MG: 800 TABLET ORAL at 17:32

## 2020-04-04 RX ADMIN — Medication SCH GM: at 21:56

## 2020-04-04 RX ADMIN — LEVETIRACETAM SCH MG: 500 TABLET, FILM COATED ORAL at 11:16

## 2020-04-04 RX ADMIN — LEVETIRACETAM SCH MG: 500 TABLET, FILM COATED ORAL at 21:50

## 2020-04-04 RX ADMIN — VANCOMYCIN HYDROCHLORIDE SCH MG: 500 INJECTION, POWDER, LYOPHILIZED, FOR SOLUTION INTRAVENOUS at 16:19

## 2020-04-04 RX ADMIN — Medication SCH ML: at 16:19

## 2020-04-04 RX ADMIN — RENAGEL SCH MG: 800 TABLET ORAL at 11:17

## 2020-04-04 RX ADMIN — OXYCODONE HYDROCHLORIDE AND ACETAMINOPHEN SCH MG: 500 TABLET ORAL at 21:51

## 2020-04-04 RX ADMIN — Medication SCH ML: at 05:39

## 2020-04-04 RX ADMIN — Medication SCH EACH: at 11:16

## 2020-04-04 RX ADMIN — RENAGEL SCH MG: 800 TABLET ORAL at 08:00

## 2020-04-04 RX ADMIN — Medication SCH EACH: at 21:51

## 2020-04-04 RX ADMIN — FAMOTIDINE SCH MG: 20 TABLET, FILM COATED ORAL at 05:39

## 2020-04-04 RX ADMIN — THERA TABS SCH UDTAB: TAB at 05:39

## 2020-04-04 RX ADMIN — VANCOMYCIN HYDROCHLORIDE SCH MG: 500 INJECTION, POWDER, LYOPHILIZED, FOR SOLUTION INTRAVENOUS at 02:21

## 2020-04-04 RX ADMIN — CARVEDILOL SCH MG: 6.25 TABLET, FILM COATED ORAL at 17:34

## 2020-04-04 RX ADMIN — DEXTROSE SCH MLS/HR: 50 INJECTION, SOLUTION INTRAVENOUS at 11:17

## 2020-04-04 RX ADMIN — ZINC SULFATE CAP 220 MG (50 MG ELEMENTAL ZN) SCH MG: 220 (50 ZN) CAP at 21:51

## 2020-04-04 RX ADMIN — Medication SCH ML: at 21:56

## 2020-04-04 RX ADMIN — Medication SCH ML: at 02:55

## 2020-04-04 RX ADMIN — Medication SCH GM: at 11:30

## 2020-04-04 RX ADMIN — THERA TABS SCH UDTAB: TAB at 17:32

## 2020-04-04 RX ADMIN — CARVEDILOL SCH MG: 6.25 TABLET, FILM COATED ORAL at 11:15

## 2020-04-04 NOTE — NUR
Hemodialysis done. Medications given and GT feedings resumed. Oral care and trach care done. 
Sponge bath given, wound care done. Repositioned in bed comfortably

## 2020-04-04 NOTE — NUR
Patient received into care laying in bed with eye open.  Patient has flat affect and appears 
to be in a persistent vegetative state.  Patient has no signs/symptoms of acute distress or 
discomfort noted or observed by nurse. G-tube is patent and running Glucerna 1.2 @ 60mL/hr 
with no residual found.   All safety, seizure, fall, and aspiration precaution are in place. 
 Call light and personal items are within reach at all times.  Will continue to monitor and 
assess.

## 2020-04-05 VITALS — DIASTOLIC BLOOD PRESSURE: 91 MMHG | SYSTOLIC BLOOD PRESSURE: 147 MMHG

## 2020-04-05 VITALS — SYSTOLIC BLOOD PRESSURE: 161 MMHG | DIASTOLIC BLOOD PRESSURE: 108 MMHG

## 2020-04-05 VITALS — SYSTOLIC BLOOD PRESSURE: 139 MMHG | DIASTOLIC BLOOD PRESSURE: 86 MMHG

## 2020-04-05 VITALS — SYSTOLIC BLOOD PRESSURE: 141 MMHG | DIASTOLIC BLOOD PRESSURE: 91 MMHG

## 2020-04-05 VITALS — SYSTOLIC BLOOD PRESSURE: 167 MMHG | DIASTOLIC BLOOD PRESSURE: 98 MMHG

## 2020-04-05 LAB
BASOPHILS # BLD AUTO: 0 K/UL (ref 0–8)
BASOPHILS NFR BLD AUTO: 0.1 % (ref 0–2)
BUN SERPL-MCNC: 103 MG/DL (ref 7–18)
CHLORIDE SERPL-SCNC: 106 MMOL/L (ref 98–107)
CO2 SERPL-SCNC: 27 MMOL/L (ref 21–32)
CREAT SERPL-MCNC: 2 MG/DL (ref 0.6–1.3)
EOSINOPHIL # BLD AUTO: 0.2 K/UL (ref 0–0.7)
EOSINOPHIL NFR BLD AUTO: 1.3 % (ref 0–7)
GLUCOSE SERPL-MCNC: 140 MG/DL (ref 74–106)
HCT VFR BLD AUTO: 25.3 % (ref 31.2–41.9)
HGB BLD-MCNC: 8.1 G/DL (ref 10.9–14.3)
LYMPHOCYTES # BLD AUTO: 0.6 K/UL (ref 20–40)
LYMPHOCYTES NFR BLD AUTO: 4.7 % (ref 20.5–51.5)
MAGNESIUM SERPL-MCNC: 2.2 MG/DL (ref 1.8–2.4)
MCH RBC QN AUTO: 31.8 UUG (ref 24.7–32.8)
MCHC RBC AUTO-ENTMCNC: 32 G/DL (ref 32.3–35.6)
MCV RBC AUTO: 99.3 FL (ref 75.5–95.3)
MONOCYTES # BLD AUTO: 0.5 K/UL (ref 2–10)
MONOCYTES NFR BLD AUTO: 3.9 % (ref 0–11)
NEUTROPHILS # BLD AUTO: 11 K/UL (ref 1.8–8.9)
NEUTROPHILS NFR BLD AUTO: 90 % (ref 38.5–71.5)
PHOSPHATE SERPL-MCNC: 6.3 MG/DL (ref 2.5–4.9)
PLATELET # BLD AUTO: 214 K/UL (ref 179–408)
POTASSIUM SERPL-SCNC: 4.3 MMOL/L (ref 3.5–5.1)
RBC # BLD AUTO: 2.55 MIL/UL (ref 3.63–4.92)
WBC # BLD AUTO: 11.6 K/UL (ref 3.8–11.8)

## 2020-04-05 RX ADMIN — OXYCODONE HYDROCHLORIDE AND ACETAMINOPHEN SCH MG: 500 TABLET ORAL at 08:26

## 2020-04-05 RX ADMIN — RENAGEL SCH MG: 800 TABLET ORAL at 12:43

## 2020-04-05 RX ADMIN — RENAGEL SCH MG: 800 TABLET ORAL at 08:25

## 2020-04-05 RX ADMIN — Medication SCH ML: at 21:00

## 2020-04-05 RX ADMIN — Medication SCH ML: at 05:01

## 2020-04-05 RX ADMIN — Medication SCH GM: at 20:48

## 2020-04-05 RX ADMIN — Medication SCH GM: at 08:26

## 2020-04-05 RX ADMIN — THERA TABS SCH UDTAB: TAB at 05:01

## 2020-04-05 RX ADMIN — Medication SCH EACH: at 20:47

## 2020-04-05 RX ADMIN — Medication SCH TAB: at 08:25

## 2020-04-05 RX ADMIN — Medication SCH ML: at 13:16

## 2020-04-05 RX ADMIN — DEXTROSE SCH MLS/HR: 50 INJECTION, SOLUTION INTRAVENOUS at 08:27

## 2020-04-05 RX ADMIN — CARVEDILOL SCH MG: 6.25 TABLET, FILM COATED ORAL at 08:24

## 2020-04-05 RX ADMIN — Medication SCH EACH: at 08:25

## 2020-04-05 RX ADMIN — OXYCODONE HYDROCHLORIDE AND ACETAMINOPHEN SCH MG: 500 TABLET ORAL at 20:47

## 2020-04-05 RX ADMIN — LEVETIRACETAM SCH MG: 500 TABLET, FILM COATED ORAL at 20:47

## 2020-04-05 RX ADMIN — ZINC SULFATE CAP 220 MG (50 MG ELEMENTAL ZN) SCH MG: 220 (50 ZN) CAP at 20:47

## 2020-04-05 RX ADMIN — FAMOTIDINE SCH MG: 20 TABLET, FILM COATED ORAL at 05:01

## 2020-04-05 RX ADMIN — Medication SCH ML: at 01:38

## 2020-04-05 RX ADMIN — LEVETIRACETAM SCH MG: 500 TABLET, FILM COATED ORAL at 08:25

## 2020-04-05 RX ADMIN — RENAGEL SCH MG: 800 TABLET ORAL at 17:06

## 2020-04-05 RX ADMIN — CARVEDILOL SCH MG: 6.25 TABLET, FILM COATED ORAL at 17:07

## 2020-04-05 RX ADMIN — SODIUM HYPOCHLORITE SCH ML: 1.25 SOLUTION TOPICAL at 08:28

## 2020-04-05 RX ADMIN — THERA TABS SCH UDTAB: TAB at 17:07

## 2020-04-05 RX ADMIN — VANCOMYCIN HYDROCHLORIDE SCH MG: 500 INJECTION, POWDER, LYOPHILIZED, FOR SOLUTION INTRAVENOUS at 01:17

## 2020-04-05 NOTE — NUR
PATIENT IN  BED  WITH HOB ELEVATED. ON CONTACT ISOLATION FOR C-DIFF.  PATIENT ON VENTILATOR 
, GTUBE AND RODRIGUES.  NO SOB AND NO ACUTE DISTRESS NOTED AND  NO C/O PAIN. PICCLINE INTACT  
PATENT. ALL DUE MEDS GIVEN VIA GTUBE AS ORDERED AND TOLERATED WELL. KEPT CLEAN AND DRY AT 
ALL TIMES. ALL NEEDS ATTENDED. CALL LIGHT WITHIN REACH. WILL CONTINUE  TO MONITOR.

## 2020-04-05 NOTE — NUR
Received patient lying in bed. Obtunded, non-verbal, flat affect. NSR on tele at 84/min. 
Tracheostomy intact with vent. PICC line with triple lumen on right upper arm intact and 
patent. Right femoral Cory cath intact. GT feeding ongoing. Lizarraga catheter intact and 
draining via gravity. Seizure precaution observed. Safety measure initiated. Continue to 
monitor.

## 2020-04-05 NOTE — NUR
Pt received on continuous mechanical ventilation via [facility] 8DCT trach. Trach is patent and 
secured. Pt received on Cook vent with ordered settings of AC-12, VT-450, PEEP+5, FIO2-30% 
Pt tolerating vent settings well. Sxn'd small amounts of thick white/yellowish secretions. 
Tolerating vent settings well. Bag/valve/mask and back up trach at bedside. HME changed. 
Trach care done. PPE used. Will continue to monitor.

## 2020-04-05 NOTE — NUR
Patient slept intermittently throughout night with no s/s of acute distress or discomfort 
noted/observed by nurse. All nursing needs met promptly and patient is warm, dry, and 
comfortable. All safety and fall precaution measures remain in place.  Call light and 
personal items are within reach at all times.

## 2020-04-06 VITALS — DIASTOLIC BLOOD PRESSURE: 85 MMHG | SYSTOLIC BLOOD PRESSURE: 139 MMHG

## 2020-04-06 VITALS — DIASTOLIC BLOOD PRESSURE: 89 MMHG | SYSTOLIC BLOOD PRESSURE: 142 MMHG

## 2020-04-06 VITALS — SYSTOLIC BLOOD PRESSURE: 127 MMHG | DIASTOLIC BLOOD PRESSURE: 81 MMHG

## 2020-04-06 VITALS — SYSTOLIC BLOOD PRESSURE: 116 MMHG | DIASTOLIC BLOOD PRESSURE: 68 MMHG

## 2020-04-06 VITALS — SYSTOLIC BLOOD PRESSURE: 127 MMHG | DIASTOLIC BLOOD PRESSURE: 74 MMHG

## 2020-04-06 LAB
BASOPHILS # BLD AUTO: 0 K/UL (ref 0–8)
BASOPHILS NFR BLD AUTO: 0.2 % (ref 0–2)
BUN SERPL-MCNC: 119 MG/DL (ref 7–18)
CHLORIDE SERPL-SCNC: 103 MMOL/L (ref 98–107)
CO2 SERPL-SCNC: 27 MMOL/L (ref 21–32)
CREAT SERPL-MCNC: 2.2 MG/DL (ref 0.6–1.3)
EOSINOPHIL # BLD AUTO: 0.2 K/UL (ref 0–0.7)
EOSINOPHIL NFR BLD AUTO: 1.3 % (ref 0–7)
GLUCOSE SERPL-MCNC: 139 MG/DL (ref 74–106)
HCT VFR BLD AUTO: 24.2 % (ref 31.2–41.9)
HGB BLD-MCNC: 7.7 G/DL (ref 10.9–14.3)
LYMPHOCYTES # BLD AUTO: 0.6 K/UL (ref 20–40)
LYMPHOCYTES NFR BLD AUTO: 4.7 % (ref 20.5–51.5)
LYMPHOCYTES NFR BLD MANUAL: 4 % (ref 20–40)
MAGNESIUM SERPL-MCNC: 2.3 MG/DL (ref 1.8–2.4)
MCH RBC QN AUTO: 31.7 UUG (ref 24.7–32.8)
MCHC RBC AUTO-ENTMCNC: 32 G/DL (ref 32.3–35.6)
MCV RBC AUTO: 99 FL (ref 75.5–95.3)
MONOCYTES # BLD AUTO: 0.4 K/UL (ref 2–10)
MONOCYTES NFR BLD AUTO: 3.2 % (ref 0–11)
MONOCYTES NFR BLD MANUAL: 3 % (ref 2–10)
NEUTROPHILS # BLD AUTO: 11.3 K/UL (ref 1.8–8.9)
NEUTROPHILS NFR BLD AUTO: 90.6 % (ref 38.5–71.5)
NEUTS SEG NFR BLD MANUAL: 93 % (ref 42–75)
PHOSPHATE SERPL-MCNC: 7.1 MG/DL (ref 2.5–4.9)
PLATELET # BLD AUTO: 193 K/UL (ref 179–408)
POTASSIUM SERPL-SCNC: 4.8 MMOL/L (ref 3.5–5.1)
RBC # BLD AUTO: 2.44 MIL/UL (ref 3.63–4.92)
WBC # BLD AUTO: 12.4 K/UL (ref 3.8–11.8)
WBC NRBC COR # BLD AUTO: 1 /100WBC

## 2020-04-06 RX ADMIN — THERA TABS SCH UDTAB: TAB at 17:34

## 2020-04-06 RX ADMIN — SEVELAMER CARBONATE SCH MG: 800 POWDER, FOR SUSPENSION ORAL at 09:28

## 2020-04-06 RX ADMIN — SEVELAMER CARBONATE SCH MG: 800 POWDER, FOR SUSPENSION ORAL at 12:30

## 2020-04-06 RX ADMIN — ZINC SULFATE CAP 220 MG (50 MG ELEMENTAL ZN) SCH MG: 220 (50 ZN) CAP at 20:27

## 2020-04-06 RX ADMIN — Medication SCH GM: at 20:28

## 2020-04-06 RX ADMIN — OXYCODONE HYDROCHLORIDE AND ACETAMINOPHEN SCH MG: 500 TABLET ORAL at 20:27

## 2020-04-06 RX ADMIN — Medication SCH GM: at 09:59

## 2020-04-06 RX ADMIN — Medication SCH EACH: at 20:28

## 2020-04-06 RX ADMIN — LEVETIRACETAM SCH MG: 500 TABLET, FILM COATED ORAL at 20:28

## 2020-04-06 RX ADMIN — SODIUM HYPOCHLORITE SCH ML: 1.25 SOLUTION TOPICAL at 09:32

## 2020-04-06 RX ADMIN — VANCOMYCIN HYDROCHLORIDE SCH MG: 500 INJECTION, POWDER, LYOPHILIZED, FOR SOLUTION INTRAVENOUS at 10:11

## 2020-04-06 RX ADMIN — OXYCODONE HYDROCHLORIDE AND ACETAMINOPHEN SCH MG: 500 TABLET ORAL at 09:38

## 2020-04-06 RX ADMIN — Medication SCH EACH: at 09:37

## 2020-04-06 RX ADMIN — CARVEDILOL SCH MG: 6.25 TABLET, FILM COATED ORAL at 17:35

## 2020-04-06 RX ADMIN — CARVEDILOL SCH MG: 6.25 TABLET, FILM COATED ORAL at 09:39

## 2020-04-06 RX ADMIN — FAMOTIDINE SCH MG: 20 TABLET, FILM COATED ORAL at 05:03

## 2020-04-06 RX ADMIN — DEXTROSE SCH MLS/HR: 50 INJECTION, SOLUTION INTRAVENOUS at 09:28

## 2020-04-06 RX ADMIN — THERA TABS SCH UDTAB: TAB at 05:03

## 2020-04-06 RX ADMIN — SEVELAMER CARBONATE SCH MG: 800 POWDER, FOR SUSPENSION ORAL at 17:34

## 2020-04-06 RX ADMIN — Medication SCH ML: at 21:02

## 2020-04-06 RX ADMIN — Medication SCH TAB: at 09:37

## 2020-04-06 RX ADMIN — LEVETIRACETAM SCH MG: 500 TABLET, FILM COATED ORAL at 09:38

## 2020-04-06 RX ADMIN — Medication SCH ML: at 05:03

## 2020-04-06 RX ADMIN — Medication SCH ML: at 01:50

## 2020-04-06 RX ADMIN — Medication SCH ML: at 13:54

## 2020-04-06 NOTE — NUR
PT RECEIVED TRACH TO VENT ON CMV, SHILEY #8 TRACH IS PATENT AND SECURE. VENT PARAMETERS AND 
ALARMS CHECKED, ALARMS ARE AUDIBLE. PT IS TOLERATING VENT WELL, NO RESP. DISTRESS NOTED. BVM 
AND BACK-UP TRACH AT BEDSIDE. CONT. P.OX AT BEDSIDE. VENT PLUGGED INTO RED OUTLET. SUCTION 
PRN. WILL CONTINUE TO MONITOR.

## 2020-04-06 NOTE — NUR
ELVIS VERA AND DR RODRÍGUEZ HERE TO SEE PATIENT WITH NO NEW ORDERS AT THIS 
TIME PATIENT CONTINUES TO HAVE GENERALISED EDEMA ANASARCA INVOLVING THE ENTIRE BODY UPPER 
AND LOWER EXT ELEVATED ON THE PILLOWS TO FACILITATED DRAINAGE O2 SATS WNL GT PATENT WITH 
FEEDINGS IN PROGRESS AS ORDERED WITH NO VOMITING AT THIS TIME TURNED AND REPOSITIONED Q2H 
MADE COMFORTABLE AND WILL CONTINUE TO OBSERVE.

## 2020-04-06 NOTE — NUR
Slept well last night. NSR on tele at 87/min. Tracheostomy intact with vent. PICC line with 
triple lumen on right upper arm intact and patent. Right femoral Cory cath intact. GT 
feeding and flushing well tolerated. Lizarraga catheter intact and draining via gravity. Seizure 
precaution maintained. Safety measure maintained.

## 2020-04-06 NOTE — NUR
RECEIVED PATIENT IN BED EYES OPEN BUT NON VERBAL ALL NEEDS ANTICIPATED AND SATISFIED MAX 
ASSIST FOR ALL ADL TURNED AND REPOSITIONED Q2H PATIENT HAS A TRACH BENITO NUMBER 8 WITH 
ADEQUATE SATS AT THIS TIME GT PATIENT ON HOLD UNTIL 1000 AS ORDERED RODRIGUES CATH TO GRAVITY 
DRAINAGE GENERALISED EDEMA EXT ELEVATED TO DECREASE EDEMA.RODRIGUES CATH TO GRAVITY DRAINAGE 
SACRAL DECB PATIENT REPOSITIONED TO THE SIDES MADE COMFORTABLE WILL CONTINUE TO OBSERVE.

## 2020-04-06 NOTE — NUR
Received patient lying in bed. Obtunded, non-verbal, flat affect, opens eyes briefly to 
tactile stimuli. Seizure precaution observed. NSR on tele at 75/min. Tracheostomy intact 
with vent. PICC line with triple lumen on right upper arm intact and patent. Right femoral 
Cory cath intact. GT feeding ongoing. Lizarraga catheter intact and draining via gravity. 
Safety measure initiated. Continue to monitor.

## 2020-04-07 VITALS — SYSTOLIC BLOOD PRESSURE: 130 MMHG | DIASTOLIC BLOOD PRESSURE: 80 MMHG

## 2020-04-07 VITALS — SYSTOLIC BLOOD PRESSURE: 131 MMHG | DIASTOLIC BLOOD PRESSURE: 84 MMHG

## 2020-04-07 VITALS — DIASTOLIC BLOOD PRESSURE: 69 MMHG | SYSTOLIC BLOOD PRESSURE: 129 MMHG

## 2020-04-07 VITALS — SYSTOLIC BLOOD PRESSURE: 121 MMHG | DIASTOLIC BLOOD PRESSURE: 70 MMHG

## 2020-04-07 VITALS — DIASTOLIC BLOOD PRESSURE: 74 MMHG | SYSTOLIC BLOOD PRESSURE: 132 MMHG

## 2020-04-07 LAB
BASOPHILS # BLD AUTO: 0 K/UL (ref 0–8)
BASOPHILS NFR BLD AUTO: 0.1 % (ref 0–2)
BUN SERPL-MCNC: 112 MG/DL (ref 7–18)
CHLORIDE SERPL-SCNC: 105 MMOL/L (ref 98–107)
CO2 SERPL-SCNC: 25 MMOL/L (ref 21–32)
CREAT SERPL-MCNC: 2.2 MG/DL (ref 0.6–1.3)
EOSINOPHIL # BLD AUTO: 0.1 K/UL (ref 0–0.7)
EOSINOPHIL NFR BLD AUTO: 0.8 % (ref 0–7)
GLUCOSE SERPL-MCNC: 152 MG/DL (ref 74–106)
HCT VFR BLD AUTO: 25.6 % (ref 31.2–41.9)
HGB BLD-MCNC: 8.2 G/DL (ref 10.9–14.3)
LYMPHOCYTES # BLD AUTO: 0.7 K/UL (ref 20–40)
LYMPHOCYTES NFR BLD AUTO: 5.6 % (ref 20.5–51.5)
MAGNESIUM SERPL-MCNC: 2.3 MG/DL (ref 1.8–2.4)
MCH RBC QN AUTO: 31.8 UUG (ref 24.7–32.8)
MCHC RBC AUTO-ENTMCNC: 32 G/DL (ref 32.3–35.6)
MCV RBC AUTO: 99.6 FL (ref 75.5–95.3)
MONOCYTES # BLD AUTO: 0.4 K/UL (ref 2–10)
MONOCYTES NFR BLD AUTO: 3.6 % (ref 0–11)
NEUTROPHILS # BLD AUTO: 10.7 K/UL (ref 1.8–8.9)
NEUTROPHILS NFR BLD AUTO: 89.9 % (ref 38.5–71.5)
PHOSPHATE SERPL-MCNC: 7 MG/DL (ref 2.5–4.9)
PLATELET # BLD AUTO: 191 K/UL (ref 179–408)
POTASSIUM SERPL-SCNC: 5.3 MMOL/L (ref 3.5–5.1)
RBC # BLD AUTO: 2.57 MIL/UL (ref 3.63–4.92)
WBC # BLD AUTO: 11.9 K/UL (ref 3.8–11.8)

## 2020-04-07 RX ADMIN — Medication SCH ML: at 14:04

## 2020-04-07 RX ADMIN — Medication SCH EACH: at 08:41

## 2020-04-07 RX ADMIN — Medication SCH ML: at 06:05

## 2020-04-07 RX ADMIN — CARVEDILOL SCH MG: 6.25 TABLET, FILM COATED ORAL at 17:26

## 2020-04-07 RX ADMIN — LEVETIRACETAM SCH MG: 500 TABLET, FILM COATED ORAL at 08:41

## 2020-04-07 RX ADMIN — OXYCODONE HYDROCHLORIDE AND ACETAMINOPHEN SCH MG: 500 TABLET ORAL at 08:41

## 2020-04-07 RX ADMIN — VANCOMYCIN HYDROCHLORIDE SCH MG: 500 INJECTION, POWDER, LYOPHILIZED, FOR SOLUTION INTRAVENOUS at 08:47

## 2020-04-07 RX ADMIN — OXYCODONE HYDROCHLORIDE AND ACETAMINOPHEN SCH MG: 500 TABLET ORAL at 21:41

## 2020-04-07 RX ADMIN — SEVELAMER CARBONATE SCH MG: 800 POWDER, FOR SUSPENSION ORAL at 08:40

## 2020-04-07 RX ADMIN — LEVETIRACETAM SCH MG: 500 TABLET, FILM COATED ORAL at 21:42

## 2020-04-07 RX ADMIN — Medication SCH GM: at 21:47

## 2020-04-07 RX ADMIN — CARVEDILOL SCH MG: 6.25 TABLET, FILM COATED ORAL at 08:43

## 2020-04-07 RX ADMIN — ZINC SULFATE CAP 220 MG (50 MG ELEMENTAL ZN) SCH MG: 220 (50 ZN) CAP at 21:41

## 2020-04-07 RX ADMIN — SEVELAMER CARBONATE SCH MG: 800 POWDER, FOR SUSPENSION ORAL at 12:05

## 2020-04-07 RX ADMIN — Medication SCH GM: at 08:46

## 2020-04-07 RX ADMIN — SODIUM HYPOCHLORITE SCH ML: 1.25 SOLUTION TOPICAL at 08:58

## 2020-04-07 RX ADMIN — Medication SCH TAB: at 08:41

## 2020-04-07 RX ADMIN — Medication SCH ML: at 05:18

## 2020-04-07 RX ADMIN — Medication SCH EACH: at 21:42

## 2020-04-07 RX ADMIN — SEVELAMER CARBONATE SCH MG: 800 POWDER, FOR SUSPENSION ORAL at 17:25

## 2020-04-07 RX ADMIN — THERA TABS SCH UDTAB: TAB at 05:18

## 2020-04-07 RX ADMIN — THERA TABS SCH UDTAB: TAB at 17:25

## 2020-04-07 RX ADMIN — FAMOTIDINE SCH MG: 20 TABLET, FILM COATED ORAL at 05:18

## 2020-04-07 RX ADMIN — Medication SCH ML: at 21:47

## 2020-04-07 NOTE — NUR
Pt received on continuous vent AC 12  PEEP 5 FIO2 @ 30%. trach in placed and secured 
with trach tie. back up trach and ambu bag at bedside. vent checked, alarms working well and 
audible. suction prn. No distress noted at this time. Will continue to monitor.

## 2020-04-07 NOTE — NUR
NSR on tele at 81/min. Tracheostomy intact with vent. Suctions secretions PRN. PICC line 
with triple lumen on right upper arm intact and patent. Right femoral Cory cath intact. 
GT feeding and flushing well tolerated. Lizarraga catheter intact and draining via gravity, 
minimal output. Seizure precaution and safety measure maintained.

## 2020-04-07 NOTE — NUR
Patient  is trached and Cook ventilator with ordered vent settings. She is trached with a 
Shiley 8 DCT, MLT used for cuff inflation/assessment. Alarms are on/audible. Spare trach and 
Ambu bag are at bedside. Will continue to monitor throughout shift.

## 2020-04-07 NOTE — NUR
PATIENT AWAKE EYES ONLY, HOB ELEVATED, ON GTF TOLERATE WELL NO RESIDUAL NOTED. PATIENT ON 
CONTACT ISOLATION FOR C DIFF. PATIENT TRACH INTACT, SUCTION EVERY TWO AND PRN, PATIENT HAS 
NO S/S OF PAIN AT THIS TIME, CONT TO MONITOR.

## 2020-04-07 NOTE — NUR
RECEIVED PATIENT IN BED WITH EYES CLOSED BUT PROMPTLY OPENS WHEN TOUCH TRACKS WITH EYES BUT 
NON VERBAL WITH TRACH AND VENTED WITH ADEQUATE SATURATION.GT STOPPED FOR THE FOUR HOURS AS 
ORDERED.TRIPLE LUMEN IS INTACT RODRIGUES CATH WITH SCANTY YELLOW URINE OUTPUT.SHE HAS RIGHT 
GROIN LIAM CATH WITH NO DRAINAGE PATIENT CONTINUES TO HAVE GENERALISED EDEMA ABDOMEN AND 
BOTH UPPER AND LOWER EXT WITH LEAKAGE OF FLUID FROM HER LEFT ARM KEPT CLEAN AND DRY SEIZURE 
PRECAUTIONS OBSERVED BUT NO SEIZURE ACTIVITIES AT THIS TIME WILL CONTINUE TO OBSERVE AND 
PROVIDE SAFE AND THERAPEUTIC ENVIRONMENT AT ALL TIMES

## 2020-04-07 NOTE — NUR
PATIENT SEEN AND EXAMINED BY DR JAIN WITH NEW ORDER TO CHANGE TUBE FEEDING PER SUGGESTION OF 
THE DIETICIAN AND NOTED.

## 2020-04-08 ENCOUNTER — HOSPITAL ENCOUNTER (INPATIENT)
Dept: HOSPITAL 12 - SA | Age: 85
LOS: 4 days | Discharge: ANOTHER HOSPITAL | DRG: 133 | End: 2020-04-12
Attending: INTERNAL MEDICINE | Admitting: INTERNAL MEDICINE
Payer: MEDICAID

## 2020-04-08 VITALS — SYSTOLIC BLOOD PRESSURE: 133 MMHG | DIASTOLIC BLOOD PRESSURE: 73 MMHG

## 2020-04-08 VITALS — SYSTOLIC BLOOD PRESSURE: 144 MMHG | DIASTOLIC BLOOD PRESSURE: 98 MMHG

## 2020-04-08 VITALS — SYSTOLIC BLOOD PRESSURE: 101 MMHG | DIASTOLIC BLOOD PRESSURE: 51 MMHG

## 2020-04-08 VITALS — SYSTOLIC BLOOD PRESSURE: 129 MMHG | DIASTOLIC BLOOD PRESSURE: 85 MMHG

## 2020-04-08 VITALS — SYSTOLIC BLOOD PRESSURE: 147 MMHG | DIASTOLIC BLOOD PRESSURE: 91 MMHG

## 2020-04-08 VITALS — BODY MASS INDEX: 34.16 KG/M2 | HEIGHT: 65 IN | WEIGHT: 205 LBS

## 2020-04-08 DIAGNOSIS — I07.1: ICD-10-CM

## 2020-04-08 DIAGNOSIS — F32.9: ICD-10-CM

## 2020-04-08 DIAGNOSIS — G62.9: ICD-10-CM

## 2020-04-08 DIAGNOSIS — I25.2: ICD-10-CM

## 2020-04-08 DIAGNOSIS — M19.90: ICD-10-CM

## 2020-04-08 DIAGNOSIS — Z86.711: ICD-10-CM

## 2020-04-08 DIAGNOSIS — D68.69: ICD-10-CM

## 2020-04-08 DIAGNOSIS — R13.10: ICD-10-CM

## 2020-04-08 DIAGNOSIS — E43: ICD-10-CM

## 2020-04-08 DIAGNOSIS — G40.909: ICD-10-CM

## 2020-04-08 DIAGNOSIS — Z99.11: ICD-10-CM

## 2020-04-08 DIAGNOSIS — G92: ICD-10-CM

## 2020-04-08 DIAGNOSIS — I48.0: ICD-10-CM

## 2020-04-08 DIAGNOSIS — I34.0: ICD-10-CM

## 2020-04-08 DIAGNOSIS — Z87.01: ICD-10-CM

## 2020-04-08 DIAGNOSIS — Z93.1: ICD-10-CM

## 2020-04-08 DIAGNOSIS — Z93.0: ICD-10-CM

## 2020-04-08 DIAGNOSIS — I13.0: ICD-10-CM

## 2020-04-08 DIAGNOSIS — I27.20: ICD-10-CM

## 2020-04-08 DIAGNOSIS — L89.154: ICD-10-CM

## 2020-04-08 DIAGNOSIS — M48.02: ICD-10-CM

## 2020-04-08 DIAGNOSIS — N18.9: ICD-10-CM

## 2020-04-08 DIAGNOSIS — D53.9: ICD-10-CM

## 2020-04-08 DIAGNOSIS — I50.42: ICD-10-CM

## 2020-04-08 DIAGNOSIS — J96.11: Primary | ICD-10-CM

## 2020-04-08 DIAGNOSIS — Z86.718: ICD-10-CM

## 2020-04-08 DIAGNOSIS — I25.5: ICD-10-CM

## 2020-04-08 DIAGNOSIS — Z90.710: ICD-10-CM

## 2020-04-08 DIAGNOSIS — N18.5: ICD-10-CM

## 2020-04-08 DIAGNOSIS — F41.9: ICD-10-CM

## 2020-04-08 DIAGNOSIS — N17.9: ICD-10-CM

## 2020-04-08 DIAGNOSIS — R62.7: ICD-10-CM

## 2020-04-08 DIAGNOSIS — I25.10: ICD-10-CM

## 2020-04-08 DIAGNOSIS — A04.72: ICD-10-CM

## 2020-04-08 DIAGNOSIS — M81.0: ICD-10-CM

## 2020-04-08 DIAGNOSIS — N25.0: ICD-10-CM

## 2020-04-08 LAB
ALP SERPL-CCNC: 230 U/L (ref 50–136)
ALT SERPL W/O P-5'-P-CCNC: 20 U/L (ref 14–59)
AST SERPL-CCNC: 25 U/L (ref 15–37)
BASOPHILS # BLD AUTO: 0 K/UL (ref 0–8)
BASOPHILS NFR BLD AUTO: 0.2 % (ref 0–2)
BILIRUB SERPL-MCNC: 0.3 MG/DL (ref 0.2–1)
BUN SERPL-MCNC: 127 MG/DL (ref 7–18)
CHLORIDE SERPL-SCNC: 108 MMOL/L (ref 98–107)
CO2 SERPL-SCNC: 28 MMOL/L (ref 21–32)
CREAT SERPL-MCNC: 2.4 MG/DL (ref 0.6–1.3)
EOSINOPHIL # BLD AUTO: 0.2 K/UL (ref 0–0.7)
EOSINOPHIL NFR BLD AUTO: 1.5 % (ref 0–7)
GLUCOSE SERPL-MCNC: 144 MG/DL (ref 74–106)
HCT VFR BLD AUTO: 26.1 % (ref 31.2–41.9)
HGB BLD-MCNC: 8.4 G/DL (ref 10.9–14.3)
LYMPHOCYTES # BLD AUTO: 0.7 K/UL (ref 20–40)
LYMPHOCYTES NFR BLD AUTO: 6.3 % (ref 20.5–51.5)
MAGNESIUM SERPL-MCNC: 2.8 MG/DL (ref 1.8–2.4)
MCH RBC QN AUTO: 32.1 UUG (ref 24.7–32.8)
MCHC RBC AUTO-ENTMCNC: 32 G/DL (ref 32.3–35.6)
MCV RBC AUTO: 100.4 FL (ref 75.5–95.3)
MONOCYTES # BLD AUTO: 0.5 K/UL (ref 2–10)
MONOCYTES NFR BLD AUTO: 4.4 % (ref 0–11)
NEUTROPHILS # BLD AUTO: 10.3 K/UL (ref 1.8–8.9)
NEUTROPHILS NFR BLD AUTO: 87.6 % (ref 38.5–71.5)
PHOSPHATE SERPL-MCNC: 8 MG/DL (ref 2.5–4.9)
PLATELET # BLD AUTO: 182 K/UL (ref 179–408)
POTASSIUM SERPL-SCNC: 5.8 MMOL/L (ref 3.5–5.1)
RBC # BLD AUTO: 2.6 MIL/UL (ref 3.63–4.92)
WBC # BLD AUTO: 11.7 K/UL (ref 3.8–11.8)
WS STN SPEC: 6.7 G/DL (ref 6.4–8.2)

## 2020-04-08 PROCEDURE — 5A1945Z RESPIRATORY VENTILATION, 24-96 CONSECUTIVE HOURS: ICD-10-PCS | Performed by: INTERNAL MEDICINE

## 2020-04-08 RX ADMIN — Medication SCH EACH: at 08:13

## 2020-04-08 RX ADMIN — Medication SCH ML: at 05:34

## 2020-04-08 RX ADMIN — SEVELAMER CARBONATE SCH MG: 800 POWDER, FOR SUSPENSION ORAL at 08:16

## 2020-04-08 RX ADMIN — CARVEDILOL SCH MG: 6.25 TABLET, FILM COATED ORAL at 08:42

## 2020-04-08 RX ADMIN — OXYCODONE HYDROCHLORIDE AND ACETAMINOPHEN SCH MG: 500 TABLET ORAL at 08:15

## 2020-04-08 RX ADMIN — SEVELAMER CARBONATE SCH MG: 800 POWDER, FOR SUSPENSION ORAL at 13:45

## 2020-04-08 RX ADMIN — Medication SCH GM: at 08:24

## 2020-04-08 RX ADMIN — THERA TABS SCH UDTAB: TAB at 05:33

## 2020-04-08 RX ADMIN — LEVETIRACETAM SCH MG: 500 TABLET, FILM COATED ORAL at 08:13

## 2020-04-08 RX ADMIN — Medication SCH ML: at 14:00

## 2020-04-08 RX ADMIN — SODIUM HYPOCHLORITE SCH ML: 1.25 SOLUTION TOPICAL at 08:52

## 2020-04-08 RX ADMIN — Medication SCH TAB: at 08:13

## 2020-04-08 RX ADMIN — FAMOTIDINE SCH MG: 20 TABLET, FILM COATED ORAL at 05:33

## 2020-04-08 RX ADMIN — VANCOMYCIN HYDROCHLORIDE SCH MG: 500 INJECTION, POWDER, LYOPHILIZED, FOR SOLUTION INTRAVENOUS at 08:26

## 2020-04-08 NOTE — NUR
PATIENT AWAKE, HOB ELEVATED, TELE MONITOR SINUS RHYTHM AT THIS TIME. PATIENT GTF TOLERATE 
WELL, NO RESIDUAL NOTED, PATIENT STILL HAS LOOSE BM BUT NO ODOR, YELLOW COLOR IN SMALL 
AMOUNT. PATIENT HAS GENERALIZED EDEMA, PICC LINE ON R UPPER ARM INTACT, R FEMORAL LIAM 
CATH DRESSING INTACT, PATIENT HAS NO URINE, TRACH INTACT, ON VENTILATOR. CONT TO MONITOR, 
CONT CONTACT ISOLATION, NO S/S OF PAIN AT THIS TIME.

## 2020-04-08 NOTE — NUR
Received patient in bed with eyes closed. AAOx1 to touch. On trach shiley and mechanical 
ventilator. HOB elevated. In no acute distress.  PICC line on LUCRTEIA intact and patent.  
Cory femoral cath noted. Lizarraga in place. Safety measures implemented. Will continue to 
monitor.

## 2020-04-08 NOTE — NUR
Pt admitted from 3rd floor at 1800 ,accompanied By The Rn and 2 RT's ,report received by 
Suzy Mondragon,pt is a 84 years old female ,with admitting diagnosis of respiratory failure,hx of 
CAD,HTN,DM,Seizure disorder,sepsis,acute kidney failure,End stage kidney disease,has trach 
in place shiley # 8 ,connected to ventilator tv 450,ac 12,f102 30 %,abdomen soft,no 
distended has a PEG in place,has a picc line on the r upper arm triple lumen,and r femoral 3 
lumen madhav catheter,has f/c in place 16/10 cc for wound management,Pt has a sacral wound 
,generalized edema ,specially both upper extremities and lower extremities,both oozing.All 
orders verified by Dr Dwyer and carried out.

## 2020-04-09 VITALS — DIASTOLIC BLOOD PRESSURE: 48 MMHG | SYSTOLIC BLOOD PRESSURE: 110 MMHG

## 2020-04-09 VITALS — DIASTOLIC BLOOD PRESSURE: 86 MMHG | SYSTOLIC BLOOD PRESSURE: 140 MMHG

## 2020-04-09 VITALS — DIASTOLIC BLOOD PRESSURE: 55 MMHG | SYSTOLIC BLOOD PRESSURE: 102 MMHG

## 2020-04-09 VITALS — DIASTOLIC BLOOD PRESSURE: 83 MMHG | SYSTOLIC BLOOD PRESSURE: 136 MMHG

## 2020-04-09 RX ADMIN — BACITRACIN ZINC, NEOMYCIN, POLYMYXIN B SCH PKT: 400; 3.5; 5 OINTMENT TOPICAL at 08:00

## 2020-04-09 RX ADMIN — Medication SCH ML: at 21:00

## 2020-04-09 RX ADMIN — CARVEDILOL SCH MG: 3.12 TABLET, FILM COATED ORAL at 17:50

## 2020-04-09 RX ADMIN — Medication SCH TAB: at 08:01

## 2020-04-09 RX ADMIN — RENAGEL SCH MG: 800 TABLET ORAL at 12:31

## 2020-04-09 RX ADMIN — BACITRACIN ZINC, NEOMYCIN, POLYMYXIN B SCH PKT: 400; 3.5; 5 OINTMENT TOPICAL at 09:00

## 2020-04-09 RX ADMIN — HYDROGEN PEROXIDE SCH ML: 2.65 LIQUID TOPICAL at 08:46

## 2020-04-09 RX ADMIN — RENAGEL SCH MG: 800 TABLET ORAL at 17:48

## 2020-04-09 RX ADMIN — BACITRACIN ZINC, NEOMYCIN, POLYMYXIN B SCH PKT: 400; 3.5; 5 OINTMENT TOPICAL at 21:48

## 2020-04-09 RX ADMIN — ASCORBIC ACID TAB 250 MG SCH MG: 250 TAB at 08:45

## 2020-04-09 RX ADMIN — SODIUM HYPOCHLORITE SCH ML: 1.25 SOLUTION TOPICAL at 12:15

## 2020-04-09 RX ADMIN — Medication SCH APPLIC: at 08:45

## 2020-04-09 RX ADMIN — LACTOBACILLUS ACIDOPH-L.BULGARICUS 1 MILLION CELL CHEWABLE TABLET SCH TAB.CHEW: at 08:44

## 2020-04-09 RX ADMIN — ZINC SULFATE CAP 220 MG (50 MG ELEMENTAL ZN) SCH MG: 220 (50 ZN) CAP at 21:48

## 2020-04-09 RX ADMIN — THERA TABS SCH UDTAB: TAB at 17:50

## 2020-04-09 RX ADMIN — ASCORBIC ACID TAB 250 MG SCH MG: 250 TAB at 21:47

## 2020-04-09 RX ADMIN — Medication SCH APPLIC: at 21:48

## 2020-04-09 RX ADMIN — Medication SCH ML: at 08:45

## 2020-04-09 RX ADMIN — THERA TABS SCH UDTAB: TAB at 08:44

## 2020-04-09 RX ADMIN — Medication SCH ML: at 05:46

## 2020-04-09 RX ADMIN — LACTOBACILLUS ACIDOPH-L.BULGARICUS 1 MILLION CELL CHEWABLE TABLET SCH TAB.CHEW: at 21:47

## 2020-04-09 RX ADMIN — Medication PRN ML: at 21:42

## 2020-04-09 RX ADMIN — HYDROGEN PEROXIDE SCH ML: 2.65 LIQUID TOPICAL at 21:02

## 2020-04-09 RX ADMIN — Medication SCH ML: at 21:49

## 2020-04-09 RX ADMIN — Medication SCH APPLIC: at 08:46

## 2020-04-09 RX ADMIN — Medication SCH ML: at 13:32

## 2020-04-09 NOTE — NUR
PT RECEIVED ON CONTINUOUS VENT. TRACH IN PLACED AND SECURED WITH TRACH TIE. BACK UP TRACH 
AND AMBU BAG AT BEDSIDE. TRACH CARE DONE. VENT CHECKED, ALARMS WORKING WELL AND AUDIBLE. 
SUCTION PRN. NO DISTRESS NOTED AT THIS TIME. WILL CONTINUE TO MONITOR.

## 2020-04-09 NOTE — NUR
PAGED DR. MALAGON REGARDING WATER FLUSH OF 200ML/DAY CONSIDER PATIENT EXHIBITS FLUID 
OVERLOAD. HE WANTS TO KEEP THE 200ML/DAY WATER FLUSH.

## 2020-04-09 NOTE — NUR
:  Psychosocial assessment completed today.  unable to obtain signature on 
patient's admission paperwork due to patient not being alert, being noncommunicative, and 
therefore unable to sign.  Patient's code status is a Full Code.

## 2020-04-09 NOTE — NUR
PT IN BED WITH VENTILATOR NO SOB DISTRESS.BODY ASSESSMENT DONE BY NURSE GENERALIZED EDEMA. 
PT STILL ON CONTACT ISOLATION FOR C-DIFF KEEP CLEAN AND DRY MARIANA AREA WITH GOOD HAND 
WASHING. CONTINUE TX ON SACRAL WOUND AS ORDERED.TURN POSITION Q 2 HRS.ON F/C FOR WOUND 
MANAGEMENT

## 2020-04-10 VITALS — DIASTOLIC BLOOD PRESSURE: 88 MMHG | SYSTOLIC BLOOD PRESSURE: 140 MMHG

## 2020-04-10 VITALS — DIASTOLIC BLOOD PRESSURE: 76 MMHG | SYSTOLIC BLOOD PRESSURE: 124 MMHG

## 2020-04-10 RX ADMIN — CARVEDILOL SCH MG: 3.12 TABLET, FILM COATED ORAL at 05:24

## 2020-04-10 RX ADMIN — Medication SCH TAB: at 05:24

## 2020-04-10 RX ADMIN — THERA TABS SCH UDTAB: TAB at 05:25

## 2020-04-10 RX ADMIN — LACTOBACILLUS ACIDOPH-L.BULGARICUS 1 MILLION CELL CHEWABLE TABLET SCH TAB.CHEW: at 21:45

## 2020-04-10 RX ADMIN — BACITRACIN ZINC, NEOMYCIN, POLYMYXIN B SCH PKT: 400; 3.5; 5 OINTMENT TOPICAL at 08:29

## 2020-04-10 RX ADMIN — BACITRACIN ZINC, NEOMYCIN, POLYMYXIN B SCH PKT: 400; 3.5; 5 OINTMENT TOPICAL at 21:45

## 2020-04-10 RX ADMIN — Medication SCH ML: at 14:24

## 2020-04-10 RX ADMIN — HYDROGEN PEROXIDE SCH ML: 2.65 LIQUID TOPICAL at 09:14

## 2020-04-10 RX ADMIN — ASCORBIC ACID TAB 250 MG SCH MG: 250 TAB at 21:45

## 2020-04-10 RX ADMIN — HYDROCODONE BITARTRATE AND ACETAMINOPHEN PRN TAB: 5; 325 TABLET ORAL at 04:32

## 2020-04-10 RX ADMIN — Medication SCH APPLIC: at 21:45

## 2020-04-10 RX ADMIN — ASCORBIC ACID TAB 250 MG SCH MG: 250 TAB at 08:30

## 2020-04-10 RX ADMIN — Medication SCH ML: at 05:25

## 2020-04-10 RX ADMIN — SEVELAMER CARBONATE SCH MG: 800 POWDER, FOR SUSPENSION ORAL at 12:09

## 2020-04-10 RX ADMIN — HYDROCODONE BITARTRATE AND ACETAMINOPHEN PRN TAB: 5; 325 TABLET ORAL at 22:02

## 2020-04-10 RX ADMIN — LACTOBACILLUS ACIDOPH-L.BULGARICUS 1 MILLION CELL CHEWABLE TABLET SCH TAB.CHEW: at 08:32

## 2020-04-10 RX ADMIN — SODIUM HYPOCHLORITE SCH ML: 1.25 SOLUTION TOPICAL at 08:30

## 2020-04-10 RX ADMIN — Medication SCH APPLIC: at 08:29

## 2020-04-10 RX ADMIN — Medication SCH ML: at 21:46

## 2020-04-10 RX ADMIN — Medication SCH ML: at 21:00

## 2020-04-10 RX ADMIN — CARVEDILOL SCH MG: 3.12 TABLET, FILM COATED ORAL at 17:32

## 2020-04-10 RX ADMIN — SEVELAMER CARBONATE SCH MG: 800 POWDER, FOR SUSPENSION ORAL at 17:26

## 2020-04-10 RX ADMIN — ZINC SULFATE CAP 220 MG (50 MG ELEMENTAL ZN) SCH MG: 220 (50 ZN) CAP at 21:45

## 2020-04-10 RX ADMIN — FAMOTIDINE SCH MG: 20 TABLET, FILM COATED ORAL at 05:24

## 2020-04-10 RX ADMIN — Medication SCH ML: at 09:00

## 2020-04-10 RX ADMIN — SEVELAMER CARBONATE SCH MG: 800 POWDER, FOR SUSPENSION ORAL at 08:28

## 2020-04-10 RX ADMIN — VANCOMYCIN HYDROCHLORIDE SCH MG: 500 INJECTION, POWDER, LYOPHILIZED, FOR SOLUTION INTRAVENOUS at 08:34

## 2020-04-10 RX ADMIN — HYDROGEN PEROXIDE SCH ML: 2.65 LIQUID TOPICAL at 21:17

## 2020-04-11 ENCOUNTER — HOSPITAL ENCOUNTER (INPATIENT)
Dept: HOSPITAL 12 - ER | Age: 85
LOS: 3 days | DRG: 871 | End: 2020-04-14
Admitting: INTERNAL MEDICINE
Payer: MEDICARE

## 2020-04-11 VITALS — HEIGHT: 62 IN | WEIGHT: 101 LBS | BODY MASS INDEX: 18.58 KG/M2

## 2020-04-11 VITALS — DIASTOLIC BLOOD PRESSURE: 71 MMHG | SYSTOLIC BLOOD PRESSURE: 126 MMHG

## 2020-04-11 VITALS — SYSTOLIC BLOOD PRESSURE: 146 MMHG | DIASTOLIC BLOOD PRESSURE: 86 MMHG

## 2020-04-11 DIAGNOSIS — I13.2: ICD-10-CM

## 2020-04-11 DIAGNOSIS — F32.9: ICD-10-CM

## 2020-04-11 DIAGNOSIS — G92: ICD-10-CM

## 2020-04-11 DIAGNOSIS — I46.9: ICD-10-CM

## 2020-04-11 DIAGNOSIS — I25.10: ICD-10-CM

## 2020-04-11 DIAGNOSIS — J69.0: ICD-10-CM

## 2020-04-11 DIAGNOSIS — I48.0: ICD-10-CM

## 2020-04-11 DIAGNOSIS — M81.0: ICD-10-CM

## 2020-04-11 DIAGNOSIS — M48.02: ICD-10-CM

## 2020-04-11 DIAGNOSIS — Z86.718: ICD-10-CM

## 2020-04-11 DIAGNOSIS — M19.90: ICD-10-CM

## 2020-04-11 DIAGNOSIS — I25.2: ICD-10-CM

## 2020-04-11 DIAGNOSIS — D63.1: ICD-10-CM

## 2020-04-11 DIAGNOSIS — Z99.2: ICD-10-CM

## 2020-04-11 DIAGNOSIS — I27.20: ICD-10-CM

## 2020-04-11 DIAGNOSIS — E43: ICD-10-CM

## 2020-04-11 DIAGNOSIS — G40.909: ICD-10-CM

## 2020-04-11 DIAGNOSIS — Z99.11: ICD-10-CM

## 2020-04-11 DIAGNOSIS — Z51.5: ICD-10-CM

## 2020-04-11 DIAGNOSIS — Z93.1: ICD-10-CM

## 2020-04-11 DIAGNOSIS — Z87.440: ICD-10-CM

## 2020-04-11 DIAGNOSIS — R13.10: ICD-10-CM

## 2020-04-11 DIAGNOSIS — J96.11: ICD-10-CM

## 2020-04-11 DIAGNOSIS — I34.0: ICD-10-CM

## 2020-04-11 DIAGNOSIS — D53.9: ICD-10-CM

## 2020-04-11 DIAGNOSIS — Z86.711: ICD-10-CM

## 2020-04-11 DIAGNOSIS — G62.9: ICD-10-CM

## 2020-04-11 DIAGNOSIS — Z74.01: ICD-10-CM

## 2020-04-11 DIAGNOSIS — E87.5: ICD-10-CM

## 2020-04-11 DIAGNOSIS — Z79.01: ICD-10-CM

## 2020-04-11 DIAGNOSIS — I25.5: ICD-10-CM

## 2020-04-11 DIAGNOSIS — A04.72: ICD-10-CM

## 2020-04-11 DIAGNOSIS — D68.59: ICD-10-CM

## 2020-04-11 DIAGNOSIS — F41.9: ICD-10-CM

## 2020-04-11 DIAGNOSIS — L89.154: ICD-10-CM

## 2020-04-11 DIAGNOSIS — Z66: ICD-10-CM

## 2020-04-11 DIAGNOSIS — Z87.01: ICD-10-CM

## 2020-04-11 DIAGNOSIS — N81.4: ICD-10-CM

## 2020-04-11 DIAGNOSIS — I21.A1: ICD-10-CM

## 2020-04-11 DIAGNOSIS — E78.5: ICD-10-CM

## 2020-04-11 DIAGNOSIS — I50.23: ICD-10-CM

## 2020-04-11 DIAGNOSIS — R62.7: ICD-10-CM

## 2020-04-11 DIAGNOSIS — N18.6: ICD-10-CM

## 2020-04-11 DIAGNOSIS — A41.9: Primary | ICD-10-CM

## 2020-04-11 LAB
ALP SERPL-CCNC: 211 U/L (ref 50–136)
ALP SERPL-CCNC: 225 U/L (ref 50–136)
ALT SERPL W/O P-5'-P-CCNC: 25 U/L (ref 14–59)
ALT SERPL W/O P-5'-P-CCNC: 28 U/L (ref 14–59)
AST SERPL-CCNC: 26 U/L (ref 15–37)
AST SERPL-CCNC: 29 U/L (ref 15–37)
BASE EXCESS BLDA CALC-SCNC: -3.7 MMOL/L
BASOPHILS # BLD AUTO: 0 K/UL (ref 0–8)
BASOPHILS # BLD AUTO: 0 K/UL (ref 0–8)
BASOPHILS NFR BLD AUTO: 0.1 % (ref 0–2)
BASOPHILS NFR BLD AUTO: 0.2 % (ref 0–2)
BILIRUB DIRECT SERPL-MCNC: 0.1 MG/DL (ref 0–0.2)
BILIRUB SERPL-MCNC: 0.3 MG/DL (ref 0.2–1)
BILIRUB SERPL-MCNC: 0.3 MG/DL (ref 0.2–1)
BUN SERPL-MCNC: 133 MG/DL (ref 7–18)
BUN SERPL-MCNC: 142 MG/DL (ref 7–18)
CHLORIDE SERPL-SCNC: 101 MMOL/L (ref 98–107)
CHLORIDE SERPL-SCNC: 102 MMOL/L (ref 98–107)
CO2 SERPL-SCNC: 24 MMOL/L (ref 21–32)
CO2 SERPL-SCNC: 27 MMOL/L (ref 21–32)
CREAT SERPL-MCNC: 2.5 MG/DL (ref 0.6–1.3)
CREAT SERPL-MCNC: 2.6 MG/DL (ref 0.6–1.3)
EOSINOPHIL # BLD AUTO: 0.1 K/UL (ref 0–0.7)
EOSINOPHIL # BLD AUTO: 0.2 K/UL (ref 0–0.7)
EOSINOPHIL NFR BLD AUTO: 0.8 % (ref 0–7)
EOSINOPHIL NFR BLD AUTO: 1.2 % (ref 0–7)
GLUCOSE SERPL-MCNC: 108 MG/DL (ref 74–106)
GLUCOSE SERPL-MCNC: 120 MG/DL (ref 74–106)
HCO3 BLDA-SCNC: 22.1 MMOL/L
HCT VFR BLD AUTO: 25 % (ref 31.2–41.9)
HCT VFR BLD AUTO: 26.2 % (ref 31.2–41.9)
HGB BLD-MCNC: 7.8 G/DL (ref 10.9–14.3)
HGB BLD-MCNC: 8.3 G/DL (ref 10.9–14.3)
HGB BLDA OXIMETRY-MCNC: 9.1 G/DL (ref 12–16)
INHALED O2 CONCENTRATION: 36 %
INTRINSIC PEEP RESPIRATORY: 5 CMH20
LYMPHOCYTES # BLD AUTO: 0.6 K/UL (ref 20–40)
LYMPHOCYTES # BLD AUTO: 0.6 K/UL (ref 20–40)
LYMPHOCYTES NFR BLD AUTO: 4.1 % (ref 20.5–51.5)
LYMPHOCYTES NFR BLD AUTO: 4.6 % (ref 20.5–51.5)
MAGNESIUM SERPL-MCNC: 2.7 MG/DL (ref 1.8–2.4)
MCH RBC QN AUTO: 31.3 UUG (ref 24.7–32.8)
MCH RBC QN AUTO: 32 UUG (ref 24.7–32.8)
MCHC RBC AUTO-ENTMCNC: 31 G/DL (ref 32.3–35.6)
MCHC RBC AUTO-ENTMCNC: 32 G/DL (ref 32.3–35.6)
MCV RBC AUTO: 100.8 FL (ref 75.5–95.3)
MCV RBC AUTO: 101 FL (ref 75.5–95.3)
MONOCYTES # BLD AUTO: 0.4 K/UL (ref 2–10)
MONOCYTES # BLD AUTO: 0.5 K/UL (ref 2–10)
MONOCYTES NFR BLD AUTO: 3.1 % (ref 0–11)
MONOCYTES NFR BLD AUTO: 3.3 % (ref 0–11)
NEUTROPHILS # BLD AUTO: 12.7 K/UL (ref 1.8–8.9)
NEUTROPHILS # BLD AUTO: 13 K/UL (ref 1.8–8.9)
NEUTROPHILS NFR BLD AUTO: 91.2 % (ref 38.5–71.5)
NEUTROPHILS NFR BLD AUTO: 91.4 % (ref 38.5–71.5)
PCO2 TEMP ADJ BLDA: 42.8 MMHG (ref 35–45)
PEEP SETTING VENT: 450 ML
PH TEMP ADJ BLDA: 7.33 [PH] (ref 7.35–7.45)
PHOSPHATE SERPL-MCNC: 6.9 MG/DL (ref 2.5–4.9)
PLATELET # BLD AUTO: 191 K/UL (ref 179–408)
PLATELET # BLD AUTO: 214 K/UL (ref 179–408)
PO2 TEMP ADJ BLDA: 77.6 MMHG (ref 75–100)
POTASSIUM SERPL-SCNC: 4.7 MMOL/L (ref 3.5–5.1)
POTASSIUM SERPL-SCNC: 4.8 MMOL/L (ref 3.5–5.1)
RBC # BLD AUTO: 2.48 MIL/UL (ref 3.63–4.92)
RBC # BLD AUTO: 2.59 MIL/UL (ref 3.63–4.92)
SET RATE, BG: 12
SPECIMEN DRAWN FROM PATIENT: (no result)
TSH SERPL DL<=0.005 MIU/L-ACNC: 11.49 MIU/ML (ref 0.36–3.74)
VENTILATION MODE VENT: (no result)
WBC # BLD AUTO: 13.9 K/UL (ref 3.8–11.8)
WBC # BLD AUTO: 14.2 K/UL (ref 3.8–11.8)
WS STN SPEC: 6.3 G/DL (ref 6.4–8.2)
WS STN SPEC: 6.6 G/DL (ref 6.4–8.2)

## 2020-04-11 PROCEDURE — A4217 STERILE WATER/SALINE, 500 ML: HCPCS

## 2020-04-11 PROCEDURE — G0378 HOSPITAL OBSERVATION PER HR: HCPCS

## 2020-04-11 RX ADMIN — VANCOMYCIN HYDROCHLORIDE SCH MG: 500 INJECTION, POWDER, LYOPHILIZED, FOR SOLUTION INTRAVENOUS at 08:10

## 2020-04-11 RX ADMIN — ASCORBIC ACID TAB 250 MG SCH MG: 250 TAB at 08:10

## 2020-04-11 RX ADMIN — ASCORBIC ACID TAB 250 MG SCH MG: 250 TAB at 21:00

## 2020-04-11 RX ADMIN — FAMOTIDINE SCH MG: 20 TABLET, FILM COATED ORAL at 05:26

## 2020-04-11 RX ADMIN — CARVEDILOL SCH MG: 3.12 TABLET, FILM COATED ORAL at 05:52

## 2020-04-11 RX ADMIN — Medication SCH ML: at 14:21

## 2020-04-11 RX ADMIN — LACTOBACILLUS ACIDOPH-L.BULGARICUS 1 MILLION CELL CHEWABLE TABLET SCH TAB.CHEW: at 08:10

## 2020-04-11 RX ADMIN — Medication SCH APPLIC: at 08:10

## 2020-04-11 RX ADMIN — SODIUM HYPOCHLORITE SCH ML: 1.25 SOLUTION TOPICAL at 08:10

## 2020-04-11 RX ADMIN — BACITRACIN ZINC, NEOMYCIN, POLYMYXIN B SCH PKT: 400; 3.5; 5 OINTMENT TOPICAL at 08:11

## 2020-04-11 RX ADMIN — Medication PRN ML: at 05:26

## 2020-04-11 RX ADMIN — Medication SCH APPLIC: at 08:11

## 2020-04-11 RX ADMIN — CARVEDILOL SCH MG: 3.12 TABLET, FILM COATED ORAL at 17:29

## 2020-04-11 RX ADMIN — SEVELAMER CARBONATE SCH MG: 800 POWDER, FOR SUSPENSION ORAL at 12:27

## 2020-04-11 RX ADMIN — Medication SCH ML: at 05:26

## 2020-04-11 RX ADMIN — ZINC SULFATE CAP 220 MG (50 MG ELEMENTAL ZN) SCH MG: 220 (50 ZN) CAP at 21:00

## 2020-04-11 RX ADMIN — Medication SCH ML: at 22:35

## 2020-04-11 RX ADMIN — Medication SCH ML: at 09:00

## 2020-04-11 RX ADMIN — HYDROGEN PEROXIDE SCH ML: 2.65 LIQUID TOPICAL at 08:00

## 2020-04-11 RX ADMIN — SEVELAMER CARBONATE SCH MG: 800 POWDER, FOR SUSPENSION ORAL at 08:10

## 2020-04-11 RX ADMIN — Medication SCH APPLIC: at 21:00

## 2020-04-11 RX ADMIN — BACITRACIN ZINC, NEOMYCIN, POLYMYXIN B SCH PKT: 400; 3.5; 5 OINTMENT TOPICAL at 21:00

## 2020-04-11 RX ADMIN — Medication SCH TAB: at 05:26

## 2020-04-11 RX ADMIN — SEVELAMER CARBONATE SCH MG: 800 POWDER, FOR SUSPENSION ORAL at 16:29

## 2020-04-11 RX ADMIN — LACTOBACILLUS ACIDOPH-L.BULGARICUS 1 MILLION CELL CHEWABLE TABLET SCH TAB.CHEW: at 21:00

## 2020-04-11 RX ADMIN — HYDROGEN PEROXIDE SCH ML: 2.65 LIQUID TOPICAL at 21:23

## 2020-04-11 NOTE — NUR
-------------------------------------------------------------------------------

          *** Note undone in [facility] - 04/12/20 at 0053 by JANE ***          

-------------------------------------------------------------------------------

DR JACK ON DAVID CALL WITH DR JAIN.

## 2020-04-11 NOTE — NUR
found pt blue and no pulse, suctioned pt with small amount of secretion, start bagging pt, 
called amy espana dr, supervisor, respiratory therapist and icu nurse came. pt 
looks less blue and felt pulse.

## 2020-04-11 NOTE — NUR
amy mars ordered transfer to e.. for evaluation, pt transferred to e.. with amy darling, 
floor nurse,nsg supervisor and respiratory therapist.

## 2020-04-11 NOTE — NUR
-------------------------------------------------------------------------------

          *** Note undone in [facility] - 04/12/20 at 0126 by JANE ***          

-------------------------------------------------------------------------------

RESUMED CARE FOR PT. PATIENT IN STABLE CONDITION, V/S STABLE, NO ACUTE DISTRESS 
NOTED.

## 2020-04-12 VITALS — SYSTOLIC BLOOD PRESSURE: 131 MMHG | DIASTOLIC BLOOD PRESSURE: 80 MMHG

## 2020-04-12 VITALS — DIASTOLIC BLOOD PRESSURE: 89 MMHG | SYSTOLIC BLOOD PRESSURE: 135 MMHG

## 2020-04-12 VITALS — SYSTOLIC BLOOD PRESSURE: 129 MMHG | DIASTOLIC BLOOD PRESSURE: 89 MMHG

## 2020-04-12 VITALS — DIASTOLIC BLOOD PRESSURE: 93 MMHG | SYSTOLIC BLOOD PRESSURE: 129 MMHG

## 2020-04-12 VITALS — DIASTOLIC BLOOD PRESSURE: 78 MMHG | SYSTOLIC BLOOD PRESSURE: 132 MMHG

## 2020-04-12 VITALS — DIASTOLIC BLOOD PRESSURE: 90 MMHG | SYSTOLIC BLOOD PRESSURE: 138 MMHG

## 2020-04-12 VITALS — SYSTOLIC BLOOD PRESSURE: 122 MMHG | DIASTOLIC BLOOD PRESSURE: 59 MMHG

## 2020-04-12 VITALS — DIASTOLIC BLOOD PRESSURE: 82 MMHG | SYSTOLIC BLOOD PRESSURE: 128 MMHG

## 2020-04-12 VITALS — DIASTOLIC BLOOD PRESSURE: 92 MMHG | SYSTOLIC BLOOD PRESSURE: 140 MMHG

## 2020-04-12 VITALS — DIASTOLIC BLOOD PRESSURE: 87 MMHG | SYSTOLIC BLOOD PRESSURE: 133 MMHG

## 2020-04-12 VITALS — SYSTOLIC BLOOD PRESSURE: 129 MMHG | DIASTOLIC BLOOD PRESSURE: 90 MMHG

## 2020-04-12 VITALS — DIASTOLIC BLOOD PRESSURE: 78 MMHG | SYSTOLIC BLOOD PRESSURE: 135 MMHG

## 2020-04-12 VITALS — SYSTOLIC BLOOD PRESSURE: 132 MMHG | DIASTOLIC BLOOD PRESSURE: 92 MMHG

## 2020-04-12 VITALS — DIASTOLIC BLOOD PRESSURE: 87 MMHG | SYSTOLIC BLOOD PRESSURE: 135 MMHG

## 2020-04-12 VITALS — SYSTOLIC BLOOD PRESSURE: 128 MMHG | DIASTOLIC BLOOD PRESSURE: 78 MMHG

## 2020-04-12 VITALS — SYSTOLIC BLOOD PRESSURE: 126 MMHG | DIASTOLIC BLOOD PRESSURE: 86 MMHG

## 2020-04-12 VITALS — SYSTOLIC BLOOD PRESSURE: 111 MMHG | DIASTOLIC BLOOD PRESSURE: 81 MMHG

## 2020-04-12 VITALS — SYSTOLIC BLOOD PRESSURE: 131 MMHG | DIASTOLIC BLOOD PRESSURE: 89 MMHG

## 2020-04-12 VITALS — SYSTOLIC BLOOD PRESSURE: 129 MMHG | DIASTOLIC BLOOD PRESSURE: 82 MMHG

## 2020-04-12 VITALS — SYSTOLIC BLOOD PRESSURE: 144 MMHG | DIASTOLIC BLOOD PRESSURE: 92 MMHG

## 2020-04-12 VITALS — DIASTOLIC BLOOD PRESSURE: 81 MMHG | SYSTOLIC BLOOD PRESSURE: 126 MMHG

## 2020-04-12 VITALS — SYSTOLIC BLOOD PRESSURE: 119 MMHG | DIASTOLIC BLOOD PRESSURE: 77 MMHG

## 2020-04-12 LAB
BASE EXCESS BLDA CALC-SCNC: -0.5 MMOL/L
BASOPHILS # BLD AUTO: 0 K/UL (ref 0–8)
BASOPHILS NFR BLD AUTO: 0.2 % (ref 0–2)
BUN SERPL-MCNC: 136 MG/DL (ref 7–18)
CHLORIDE SERPL-SCNC: 104 MMOL/L (ref 98–107)
CO2 SERPL-SCNC: 21 MMOL/L (ref 21–32)
CREAT SERPL-MCNC: 2.7 MG/DL (ref 0.6–1.3)
EOSINOPHIL # BLD AUTO: 0 K/UL (ref 0–0.7)
EOSINOPHIL NFR BLD AUTO: 0.3 % (ref 0–7)
GLUCOSE SERPL-MCNC: 118 MG/DL (ref 74–106)
HCO3 BLDA-SCNC: 24.3 MMOL/L
HCT VFR BLD AUTO: 25.7 % (ref 31.2–41.9)
HGB BLD-MCNC: 8.2 G/DL (ref 10.9–14.3)
HGB BLDA OXIMETRY-MCNC: 6.2 G/DL (ref 12–16)
INHALED O2 CONCENTRATION: 36 %
INTRINSIC PEEP RESPIRATORY: 8 CMH20
LYMPHOCYTES # BLD AUTO: 0.6 K/UL (ref 20–40)
LYMPHOCYTES NFR BLD AUTO: 3.8 % (ref 20.5–51.5)
MAGNESIUM SERPL-MCNC: 2.7 MG/DL (ref 1.8–2.4)
MCH RBC QN AUTO: 31.9 UUG (ref 24.7–32.8)
MCHC RBC AUTO-ENTMCNC: 32 G/DL (ref 32.3–35.6)
MCV RBC AUTO: 99.6 FL (ref 75.5–95.3)
MONOCYTES # BLD AUTO: 0.4 K/UL (ref 2–10)
MONOCYTES NFR BLD AUTO: 3 % (ref 0–11)
NEUTROPHILS # BLD AUTO: 13.8 K/UL (ref 1.8–8.9)
NEUTROPHILS NFR BLD AUTO: 92.7 % (ref 38.5–71.5)
PCO2 TEMP ADJ BLDA: 40.6 MMHG (ref 35–45)
PEEP SETTING VENT: 450 ML
PH TEMP ADJ BLDA: 7.39 [PH] (ref 7.35–7.45)
PHOSPHATE SERPL-MCNC: 7.6 MG/DL (ref 2.5–4.9)
PLATELET # BLD AUTO: 203 K/UL (ref 179–408)
PO2 TEMP ADJ BLDA: 57.4 MMHG (ref 75–100)
POTASSIUM SERPL-SCNC: 5.1 MMOL/L (ref 3.5–5.1)
RBC # BLD AUTO: 2.58 MIL/UL (ref 3.63–4.92)
SET RATE, BG: 12
SPECIMEN DRAWN FROM PATIENT: (no result)
VENTILATION MODE VENT: (no result)
WBC # BLD AUTO: 14.9 K/UL (ref 3.8–11.8)

## 2020-04-12 PROCEDURE — 5A12012 PERFORMANCE OF CARDIAC OUTPUT, SINGLE, MANUAL: ICD-10-PCS | Performed by: INTERNAL MEDICINE

## 2020-04-12 PROCEDURE — 5A1945Z RESPIRATORY VENTILATION, 24-96 CONSECUTIVE HOURS: ICD-10-PCS | Performed by: INTERNAL MEDICINE

## 2020-04-12 RX ADMIN — SEVELAMER CARBONATE SCH MG: 800 POWDER, FOR SUSPENSION ORAL at 17:31

## 2020-04-12 RX ADMIN — ASCORBIC ACID TAB 250 MG SCH MG: 250 TAB at 20:26

## 2020-04-12 RX ADMIN — VANCOMYCIN HYDROCHLORIDE SCH MG: 500 INJECTION, POWDER, LYOPHILIZED, FOR SOLUTION INTRAVENOUS at 13:03

## 2020-04-12 RX ADMIN — BACITRACIN ZINC, NEOMYCIN, POLYMYXIN B SCH PKT: 400; 3.5; 5 OINTMENT TOPICAL at 20:22

## 2020-04-12 RX ADMIN — BACITRACIN ZINC, NEOMYCIN, POLYMYXIN B SCH PKT: 400; 3.5; 5 OINTMENT TOPICAL at 20:21

## 2020-04-12 RX ADMIN — SEVELAMER CARBONATE SCH MG: 800 POWDER, FOR SUSPENSION ORAL at 13:02

## 2020-04-12 NOTE — NUR
Pt received on continuous mechanical ventilation via Shiley 8 DCT trach. PT is on HT-50 vent 
with ordered settings of A/C-12, VT-450, PEEP+8, FIO2-36% (4LPM Bleed-in). Trach is patent 
and secured. Pt tolerating vent settings well, except for continuos High Peak Pressures. 
Sxn'd small amount of white secretions. Vent plugged into red emergency outlet. Vent alarm 
parameters checked, on and audible. HME changed. PPE used.

## 2020-04-12 NOTE — NUR
Pulmonary services, Dr. Saucedo in the unit to see and examine patient, full report given 
see orders.

## 2020-04-12 NOTE — NUR
Pt. admitted to CCU4 , under care of 

Belongs List completed.PT TRANSFERRED W/RESPIRATORY TECH. V/S STABLE. PT IN 
STABLE CONDITION, NO DISTRESS DURING DRANSFER.

## 2020-04-13 VITALS — SYSTOLIC BLOOD PRESSURE: 135 MMHG | DIASTOLIC BLOOD PRESSURE: 60 MMHG

## 2020-04-13 VITALS — DIASTOLIC BLOOD PRESSURE: 94 MMHG | SYSTOLIC BLOOD PRESSURE: 147 MMHG

## 2020-04-13 VITALS — DIASTOLIC BLOOD PRESSURE: 94 MMHG | SYSTOLIC BLOOD PRESSURE: 137 MMHG

## 2020-04-13 VITALS — SYSTOLIC BLOOD PRESSURE: 137 MMHG | DIASTOLIC BLOOD PRESSURE: 101 MMHG

## 2020-04-13 VITALS — SYSTOLIC BLOOD PRESSURE: 135 MMHG | DIASTOLIC BLOOD PRESSURE: 92 MMHG

## 2020-04-13 VITALS — DIASTOLIC BLOOD PRESSURE: 92 MMHG | SYSTOLIC BLOOD PRESSURE: 142 MMHG

## 2020-04-13 VITALS — DIASTOLIC BLOOD PRESSURE: 90 MMHG | SYSTOLIC BLOOD PRESSURE: 153 MMHG

## 2020-04-13 VITALS — DIASTOLIC BLOOD PRESSURE: 57 MMHG | SYSTOLIC BLOOD PRESSURE: 143 MMHG

## 2020-04-13 VITALS — DIASTOLIC BLOOD PRESSURE: 92 MMHG | SYSTOLIC BLOOD PRESSURE: 144 MMHG

## 2020-04-13 VITALS — DIASTOLIC BLOOD PRESSURE: 95 MMHG | SYSTOLIC BLOOD PRESSURE: 150 MMHG

## 2020-04-13 VITALS — DIASTOLIC BLOOD PRESSURE: 88 MMHG | SYSTOLIC BLOOD PRESSURE: 136 MMHG

## 2020-04-13 VITALS — SYSTOLIC BLOOD PRESSURE: 131 MMHG | DIASTOLIC BLOOD PRESSURE: 87 MMHG

## 2020-04-13 VITALS — DIASTOLIC BLOOD PRESSURE: 84 MMHG | SYSTOLIC BLOOD PRESSURE: 146 MMHG

## 2020-04-13 VITALS — DIASTOLIC BLOOD PRESSURE: 87 MMHG | SYSTOLIC BLOOD PRESSURE: 133 MMHG

## 2020-04-13 VITALS — SYSTOLIC BLOOD PRESSURE: 128 MMHG | DIASTOLIC BLOOD PRESSURE: 85 MMHG

## 2020-04-13 VITALS — SYSTOLIC BLOOD PRESSURE: 135 MMHG | DIASTOLIC BLOOD PRESSURE: 84 MMHG

## 2020-04-13 LAB
BASE EXCESS BLDA CALC-SCNC: -2.5 MMOL/L
BASOPHILS # BLD AUTO: 0.1 K/UL (ref 0–8)
BASOPHILS NFR BLD AUTO: 0.4 % (ref 0–2)
BUN SERPL-MCNC: 149 MG/DL (ref 7–18)
CHLORIDE SERPL-SCNC: 100 MMOL/L (ref 98–107)
CO2 SERPL-SCNC: 25 MMOL/L (ref 21–32)
CREAT SERPL-MCNC: 2.8 MG/DL (ref 0.6–1.3)
EOSINOPHIL # BLD AUTO: 0.1 K/UL (ref 0–0.7)
EOSINOPHIL NFR BLD AUTO: 0.5 % (ref 0–7)
GLUCOSE SERPL-MCNC: 127 MG/DL (ref 74–106)
HCO3 BLDA-SCNC: 23 MMOL/L
HCT VFR BLD AUTO: 25.9 % (ref 31.2–41.9)
HGB BLD-MCNC: 8.3 G/DL (ref 10.9–14.3)
HGB BLDA OXIMETRY-MCNC: 8.7 G/DL (ref 12–16)
INHALED O2 CONCENTRATION: 36 %
INHALED O2 FLOW RATE: 4 L/MIN (ref 0–30)
INTRINSIC PEEP RESPIRATORY: 5 CMH20
LYMPHOCYTES # BLD AUTO: 0.6 K/UL (ref 20–40)
LYMPHOCYTES NFR BLD AUTO: 4.4 % (ref 20.5–51.5)
MAGNESIUM SERPL-MCNC: 2.9 MG/DL (ref 1.8–2.4)
MCH RBC QN AUTO: 31.8 UUG (ref 24.7–32.8)
MCHC RBC AUTO-ENTMCNC: 32 G/DL (ref 32.3–35.6)
MCV RBC AUTO: 99 FL (ref 75.5–95.3)
MONOCYTES # BLD AUTO: 0.5 K/UL (ref 2–10)
MONOCYTES NFR BLD AUTO: 3.4 % (ref 0–11)
NEUTROPHILS # BLD AUTO: 13.2 K/UL (ref 1.8–8.9)
NEUTROPHILS NFR BLD AUTO: 91.3 % (ref 38.5–71.5)
PCO2 TEMP ADJ BLDA: 42.7 MMHG (ref 35–45)
PEEP SETTING VENT: 450 ML
PH TEMP ADJ BLDA: 7.35 [PH] (ref 7.35–7.45)
PHOSPHATE SERPL-MCNC: 7.6 MG/DL (ref 2.5–4.9)
PLATELET # BLD AUTO: 207 K/UL (ref 179–408)
PO2 TEMP ADJ BLDA: 106.8 MMHG (ref 75–100)
POTASSIUM SERPL-SCNC: 5.2 MMOL/L (ref 3.5–5.1)
RBC # BLD AUTO: 2.62 MIL/UL (ref 3.63–4.92)
SET RATE, BG: 12
SPECIMEN DRAWN FROM PATIENT: (no result)
VENTILATION MODE VENT: (no result)
WBC # BLD AUTO: 14.4 K/UL (ref 3.8–11.8)

## 2020-04-13 PROCEDURE — 5A1D70Z PERFORMANCE OF URINARY FILTRATION, INTERMITTENT, LESS THAN 6 HOURS PER DAY: ICD-10-PCS

## 2020-04-13 RX ADMIN — SEVELAMER CARBONATE SCH MG: 800 POWDER, FOR SUSPENSION ORAL at 12:32

## 2020-04-13 RX ADMIN — BACITRACIN ZINC, NEOMYCIN, POLYMYXIN B SCH PKT: 400; 3.5; 5 OINTMENT TOPICAL at 08:12

## 2020-04-13 RX ADMIN — SEVELAMER CARBONATE SCH MG: 800 POWDER, FOR SUSPENSION ORAL at 08:11

## 2020-04-13 RX ADMIN — VANCOMYCIN HYDROCHLORIDE SCH MG: 500 INJECTION, POWDER, LYOPHILIZED, FOR SOLUTION INTRAVENOUS at 09:22

## 2020-04-13 RX ADMIN — ASCORBIC ACID TAB 250 MG SCH MG: 250 TAB at 08:11

## 2020-04-13 RX ADMIN — DEXTROSE PRN MLS/HR: 50 INJECTION, SOLUTION INTRAVENOUS at 18:39

## 2020-04-13 NOTE — NUR
Dr Dozier called to increase morphine to 6 mg- done.  also ordered to dc ventilator 
and put on trache mask - informed [employer] RT/

## 2020-04-13 NOTE — NUR
Bioethics Meeting Summary

Meeting was held in [facility] at 1430. Present were Jered HDEZO, Sofía Prado, Director ICU and Dillon, Dr Dwyer attending physician, Bishnu Mcgowan, Director 
Infection Control and Dr Gregoria Mares, Director of Clinical Social Work. Dr Honeycutt, Chief of 
Staff advised that patient has multiple organ failure, cardiac arrest, encephalopathy, renal 
failure and electrolyte imbalance. Patient has no next-of-kin or responsible party. Dr Honeycutt 
advised that patient has no quality of life and that care is futile now. He suggested 
comfort care with morphine drip and no dialysis. Patient will be downgraded to medsurg. 
floor. Dr Dwyer will implement aforementioned.

## 2020-04-13 NOTE — NUR
patient transferred from CCU at this time, kapoor catheter noted, trache and ventilator 
noted, PICC noted on right upper arm, peg tube noted in place, sacral ulcer noted, MD Dwyer 
made aware of transfer

## 2020-04-13 NOTE — NUR
Dialysis completed 2L of hemodialysis fluid removed. VSS wnl. Pt stable and nad noted upon 
completion of dialysis treatment.

## 2020-04-13 NOTE — NUR
WOUND CARE CONSULT: PT FOLLOWED BY SURGICAL TEAM FOR WOUND CARE. DEFER TO SURGICAL TEAM FOR 
WOUND TREATMENT PLAN. DISCUSSED SKIN PROTECTION WITH NURSING STAFF. PT ON FIRST STEP STEPHANIES 
LOW AIRLOSS MATTRESS.WILL SEE PRN. MD IN AGREEMENT WITH PLAN OF CARE. CURRENT MAGDALENO SCORE 
IS 12.

## 2020-04-13 NOTE — NUR
Per MD orders placed removed from ventilator and subsequently placed on cool aerosol 
FIO2-30%. No resp. distress noted. RN CP aware and notified.

-------------------------------------------------------------------------------

Addendum: 04/14/20 at 0209 by ROLAN BAKER RT

-------------------------------------------------------------------------------

Correction; 4th word

pt, not placed

## 2020-04-14 VITALS — SYSTOLIC BLOOD PRESSURE: 97 MMHG | DIASTOLIC BLOOD PRESSURE: 51 MMHG

## 2020-04-14 RX ADMIN — DEXTROSE PRN MLS/HR: 50 INJECTION, SOLUTION INTRAVENOUS at 05:49

## 2020-04-14 NOTE — NUR
DR LOPEZ HERE TO SEE PATIENT PULSE IS VERY WEAK AND THREADY REMAIN ON MORPHINE DRIP AS 
ORDERED WITH NO FACIAL GRIMACING NO RESTLESSNESS SEEMS AND LOOKS COMFORTABLE AT THIS TIME 
WILL CONTINUE TO OBSERVE.

## 2020-04-14 NOTE — NUR
RECEIVED PATIENT IN BED WITH EYES CLOSED DID NOT REACT TO VERBAL OR TACTILE STIMULATION SHE 
IS ON 30 PERCENT O2 BY TRACH MASK WITH NO SHORTNESS OF BREATH AT THIS TIME.REMAIN ON 
MORPHINE DRIP AS ORDERED CURRENTLY AT 10MG PER HOUR VIA PICC LINE ON RIGHT UPPER ARM WITH NO 
S/S OF INFILTERATION ON SITE NO MOANING OR FACIAL GRIMACING SEEMS COMFORTABLE RODRIGUES CATH 
WITH NO URINE OUTPUT AT THIS TIME CONTINUES TO HAVE GENERALISED EDEMA BOTH UPPER AND LOWER 
EXT ELEVATED RIGHT GROIN LIAM CATH REMAINS INTACT MADE COMFORTABLE WILL CONTINUE TO 
OBSERVE.

## 2020-04-14 NOTE — NUR
Dolores appears uncomfortable and breathing deeply; morphine increased to 8 mg per hour; will 
monitor.

## 2020-04-14 NOTE — NUR
NOTED THAT PATIENT HAS STOPPED BREATHING APNIEC AT THIS TIME UNABLE TO GET ANY BLOOD 
PRESSURE NO REACTION TO PAINFUL OR TACTILE STIMULATION PRONOUNCED BY THE CHARGE RN AURORA FERRERA NOTIFIED PATIENT HAS NO FAMILY TO NOTIFY CALLED [employer] SPOKE WITH THEM [employer]

## 2020-04-14 NOTE — NUR
REMAINS UNCHANGED DUE MEDS GIVEN MORPHINE DRIP REMAINS IN PROGRESS AS ORDERED AND PATIENT IS 
COMFORTABLE AT THIS TIME WILL CONTINUE TO OBSERVE AND PROVIDE COMFORT

## 2020-04-14 NOTE — NUR
PATIENTS REMAINS TAKEN TO THE [facility] BY THE  AND CHART SENT TO THE 
NURSING OFFICE

## 2020-04-14 NOTE — NUR
End of shift report

Patient  slept well; managed morphine drip for comfort, endorsed at 10 mg/hour; pt bathed 
and cleaned; dressing changed to sacral wound; needs attended; remains on 30% aerosol oxygen 
via trache;  report given to Nurse Vivar

## 2020-04-14 NOTE — NUR
note:



2:12pm:  Patient is a LTC subacute patient with no family, no responsible party, no legal 
representative.  DAIJA called the [facility] to consult [phone], voicemail message left for 
the [facility] to call this SW back. 



3:43pm:  DAIJA received a voicemail message from Roshni Boo at the [facility]'s office 
[phone].  DAIJA called Roshni back, but was unable to connect with her.  DAIJA left another 
voicemail message, asking for a call back.

## 2020-04-14 NOTE — NUR
CALL RECEIVED FROM [employer] STATED THAT PATIENT IS NOT A CANDIDATE FOR [employer] DONATION 
STATED CASE IS CLOSED AND BODY CAN NOW BE RELEASE TO THE MORTUARY.SUPERVISOR A3WARE.

## 2020-04-15 NOTE — NUR
10:17am:  DAIJA attempted to contact the [facility] again this morning [phone], after 
receiving a voicemail message from Roshni Boo from the [facility]'s office, which was 
left around 4:30pm on 04/14 (after DAIJA's shift was over).  DAIJA was unable to connect with Roshni Boo, and therefore DAIJA left another voicemail message, asking for a call back.

## 2021-09-21 NOTE — NUR
dialysis removed 2500 mL last BP during dialysis reported at 153/81 HR 94 pt remained in 
normal sinus rhythm with some episodes of tachycardia. Hourly Rounding

## 2023-10-19 NOTE — NUR
EEG DONE AT THE BEDSIDE AS ORDERED. Vaccine Information Statement(s) or the Emergency Use Authorization was given today. This has been reviewed, questions answered, and verbal consent given by Patient for injection(s) and administration of Influenza (Inactivated).      Patient tolerated without incident. See immunization grid for documentation.

## 2025-05-04 NOTE — NUR
Goal Outcome Evaluation:           Progress: no change  Outcome Evaluation: Pt has been resting in bed through the night. Pt had 400mL of urine out this shift. Pt complained of pain, PRN pain meds given per orders. No acute changes or concerns at this time.                              Nursing Director Jered Nunes asked this SW this morning if SW knew whether [facility] had 
been contacted for patient's remains.  SW stated she would follow-up with House Supervisor.  
This SW and nursing supervisor Sandip King contacted House Supervisor Saulo, who stated that he 
was not informed of anything from the other house supervisors.  DAIJA called the [facility] 
[place] office [phone] and spoke with Merrick, explaining that patient had  
and does not have any family or responsible party.  Merrick stated that as long as there is 
a PCP who is willing to sign off on the body, the hospital can call the [facility] morgue at 
[phone] and they would guide the hospital through the required paperwork and the 
process to turn the patient's remains over to the [facility].  DAIJA thanked Merrick for the 
information.  DAIJA then informed Nursing Director Jerde Nunes and nursing supervisor Sandip King of above information.  Sandip stated he would relay this information to house supervisor 
Saulo in order for house supervisor to contact the [facility] to follow-up on this process.
